# Patient Record
Sex: MALE | Race: WHITE | Employment: FULL TIME | URBAN - METROPOLITAN AREA
[De-identification: names, ages, dates, MRNs, and addresses within clinical notes are randomized per-mention and may not be internally consistent; named-entity substitution may affect disease eponyms.]

---

## 2018-06-01 ENCOUNTER — HOSPITAL ENCOUNTER (EMERGENCY)
Age: 38
Discharge: HOME | End: 2018-06-01
Attending: EMERGENCY MEDICINE
Payer: COMMERCIAL

## 2018-06-01 VITALS
BODY MASS INDEX: 22.96 KG/M2 | OXYGEN SATURATION: 97 % | RESPIRATION RATE: 16 BRPM | DIASTOLIC BLOOD PRESSURE: 74 MMHG | WEIGHT: 155 LBS | TEMPERATURE: 97.7 F | HEIGHT: 69 IN | SYSTOLIC BLOOD PRESSURE: 123 MMHG | HEART RATE: 86 BPM

## 2018-06-01 DIAGNOSIS — J06.9 UPPER RESPIRATORY TRACT INFECTION, UNSPECIFIED TYPE: ICD-10-CM

## 2018-06-01 DIAGNOSIS — H60.503 ACUTE OTITIS EXTERNA OF BOTH EARS, UNSPECIFIED TYPE: Primary | ICD-10-CM

## 2018-06-01 PROCEDURE — 6370000000 HC RX 637 (ALT 250 FOR IP): Performed by: EMERGENCY MEDICINE

## 2018-06-01 PROCEDURE — 99282 EMERGENCY DEPT VISIT SF MDM: CPT

## 2018-06-01 RX ORDER — NEOMYCIN SULFATE, POLYMYXIN B SULFATE AND HYDROCORTISONE 10; 3.5; 1 MG/ML; MG/ML; [USP'U]/ML
2 SUSPENSION/ DROPS AURICULAR (OTIC) EVERY 6 HOURS SCHEDULED
Status: DISCONTINUED | OUTPATIENT
Start: 2018-06-01 | End: 2018-06-01 | Stop reason: HOSPADM

## 2018-06-01 RX ORDER — GUAIFENESIN 600 MG/1
600 TABLET, EXTENDED RELEASE ORAL 2 TIMES DAILY
Qty: 20 TABLET | Refills: 0 | Status: SHIPPED | OUTPATIENT
Start: 2018-06-01 | End: 2019-01-26 | Stop reason: ALTCHOICE

## 2018-06-01 RX ADMIN — NEOMYCIN SULFATE, POLYMYXIN B SULFATE AND HYDROCORTISONE 2 DROP: 3.5; 10000; 1 SUSPENSION AURICULAR (OTIC) at 07:35

## 2018-06-01 ASSESSMENT — ENCOUNTER SYMPTOMS
COLOR CHANGE: 0
SINUS PRESSURE: 0
BACK PAIN: 0
NAUSEA: 0
FACIAL SWELLING: 0
RHINORRHEA: 0
SHORTNESS OF BREATH: 0
VOMITING: 0
SORE THROAT: 0
COUGH: 1
CONSTIPATION: 0
DIARRHEA: 0
BLOOD IN STOOL: 0
EYE DISCHARGE: 0
EYE PAIN: 0
CHEST TIGHTNESS: 0
EYE REDNESS: 0
ABDOMINAL PAIN: 0
TROUBLE SWALLOWING: 0
WHEEZING: 0

## 2018-06-01 ASSESSMENT — PAIN SCALES - GENERAL: PAINLEVEL_OUTOF10: 8

## 2018-06-01 ASSESSMENT — PAIN DESCRIPTION - PAIN TYPE: TYPE: ACUTE PAIN

## 2018-06-01 ASSESSMENT — PAIN DESCRIPTION - ORIENTATION: ORIENTATION: RIGHT;LEFT

## 2018-06-01 ASSESSMENT — PAIN DESCRIPTION - LOCATION: LOCATION: EAR

## 2018-06-23 ENCOUNTER — APPOINTMENT (OUTPATIENT)
Dept: GENERAL RADIOLOGY | Age: 38
End: 2018-06-23
Payer: COMMERCIAL

## 2018-06-23 ENCOUNTER — HOSPITAL ENCOUNTER (EMERGENCY)
Age: 38
Discharge: HOME | End: 2018-06-23
Attending: EMERGENCY MEDICINE
Payer: COMMERCIAL

## 2018-06-23 VITALS
WEIGHT: 160 LBS | DIASTOLIC BLOOD PRESSURE: 62 MMHG | TEMPERATURE: 98.4 F | RESPIRATION RATE: 18 BRPM | HEART RATE: 86 BPM | OXYGEN SATURATION: 96 % | SYSTOLIC BLOOD PRESSURE: 103 MMHG | BODY MASS INDEX: 23.7 KG/M2 | HEIGHT: 69 IN

## 2018-06-23 DIAGNOSIS — S52.044A NONDISPLACED FRACTURE OF CORONOID PROCESS OF RIGHT ULNA, INITIAL ENCOUNTER FOR CLOSED FRACTURE: Primary | ICD-10-CM

## 2018-06-23 PROCEDURE — 6370000000 HC RX 637 (ALT 250 FOR IP): Performed by: EMERGENCY MEDICINE

## 2018-06-23 PROCEDURE — 99283 EMERGENCY DEPT VISIT LOW MDM: CPT

## 2018-06-23 PROCEDURE — 73080 X-RAY EXAM OF ELBOW: CPT

## 2018-06-23 PROCEDURE — 29105 APPLICATION LONG ARM SPLINT: CPT

## 2018-06-23 RX ORDER — IBUPROFEN 800 MG/1
800 TABLET ORAL EVERY 8 HOURS PRN
Qty: 30 TABLET | Refills: 0 | Status: SHIPPED | OUTPATIENT
Start: 2018-06-23 | End: 2019-05-25

## 2018-06-23 RX ORDER — ACETAMINOPHEN 500 MG
500 TABLET ORAL EVERY 4 HOURS PRN
Qty: 60 TABLET | Refills: 0 | Status: SHIPPED | OUTPATIENT
Start: 2018-06-23 | End: 2018-06-25

## 2018-06-23 RX ORDER — IBUPROFEN 800 MG/1
800 TABLET ORAL ONCE
Status: COMPLETED | OUTPATIENT
Start: 2018-06-23 | End: 2018-06-23

## 2018-06-23 RX ORDER — ACETAMINOPHEN 500 MG
1000 TABLET ORAL ONCE
Status: COMPLETED | OUTPATIENT
Start: 2018-06-23 | End: 2018-06-23

## 2018-06-23 RX ADMIN — IBUPROFEN 800 MG: 800 TABLET ORAL at 03:09

## 2018-06-23 RX ADMIN — ACETAMINOPHEN 1000 MG: 500 TABLET, FILM COATED ORAL at 03:09

## 2018-06-23 ASSESSMENT — ENCOUNTER SYMPTOMS
VOMITING: 0
EYE PAIN: 0
SORE THROAT: 0
DIARRHEA: 0
BACK PAIN: 0
SHORTNESS OF BREATH: 0
NAUSEA: 0
ABDOMINAL PAIN: 0
COUGH: 0

## 2018-06-23 ASSESSMENT — PAIN SCALES - GENERAL
PAINLEVEL_OUTOF10: 8
PAINLEVEL_OUTOF10: 8

## 2018-06-25 ENCOUNTER — APPOINTMENT (OUTPATIENT)
Dept: CT IMAGING | Age: 38
End: 2018-06-25
Payer: COMMERCIAL

## 2018-06-25 ENCOUNTER — HOSPITAL ENCOUNTER (EMERGENCY)
Age: 38
Discharge: HOME | End: 2018-06-25
Attending: EMERGENCY MEDICINE
Payer: COMMERCIAL

## 2018-06-25 VITALS
WEIGHT: 160 LBS | DIASTOLIC BLOOD PRESSURE: 62 MMHG | BODY MASS INDEX: 23.7 KG/M2 | TEMPERATURE: 99 F | HEART RATE: 86 BPM | HEIGHT: 69 IN | RESPIRATION RATE: 16 BRPM | OXYGEN SATURATION: 100 % | SYSTOLIC BLOOD PRESSURE: 110 MMHG

## 2018-06-25 DIAGNOSIS — M70.21 OLECRANON BURSITIS OF RIGHT ELBOW: Primary | ICD-10-CM

## 2018-06-25 PROCEDURE — 73200 CT UPPER EXTREMITY W/O DYE: CPT

## 2018-06-25 PROCEDURE — 99283 EMERGENCY DEPT VISIT LOW MDM: CPT

## 2018-06-25 RX ORDER — HYDROCODONE BITARTRATE AND ACETAMINOPHEN 5; 325 MG/1; MG/1
1 TABLET ORAL EVERY 6 HOURS PRN
Qty: 10 TABLET | Refills: 0 | Status: SHIPPED | OUTPATIENT
Start: 2018-06-25 | End: 2018-06-28

## 2018-06-25 ASSESSMENT — PAIN SCALES - GENERAL
PAINLEVEL_OUTOF10: 5
PAINLEVEL_OUTOF10: 5

## 2018-06-25 ASSESSMENT — PAIN DESCRIPTION - LOCATION: LOCATION: ELBOW

## 2018-06-25 ASSESSMENT — PAIN DESCRIPTION - ORIENTATION: ORIENTATION: RIGHT

## 2018-06-28 ENCOUNTER — HOSPITAL ENCOUNTER (EMERGENCY)
Age: 38
Discharge: HOME | End: 2018-06-28
Attending: EMERGENCY MEDICINE
Payer: COMMERCIAL

## 2018-06-28 VITALS
TEMPERATURE: 98.3 F | OXYGEN SATURATION: 100 % | HEIGHT: 69 IN | WEIGHT: 161.7 LBS | SYSTOLIC BLOOD PRESSURE: 112 MMHG | BODY MASS INDEX: 23.95 KG/M2 | RESPIRATION RATE: 16 BRPM | HEART RATE: 87 BPM | DIASTOLIC BLOOD PRESSURE: 76 MMHG

## 2018-06-28 DIAGNOSIS — L03.113 CELLULITIS OF RIGHT ELBOW: Primary | ICD-10-CM

## 2018-06-28 PROCEDURE — 99283 EMERGENCY DEPT VISIT LOW MDM: CPT

## 2018-06-28 RX ORDER — HYDROCODONE BITARTRATE AND ACETAMINOPHEN 5; 325 MG/1; MG/1
1 TABLET ORAL EVERY 6 HOURS PRN
Qty: 4 TABLET | Refills: 0 | Status: SHIPPED | OUTPATIENT
Start: 2018-06-28 | End: 2018-06-30

## 2018-06-28 RX ORDER — CEPHALEXIN 500 MG/1
500 CAPSULE ORAL 4 TIMES DAILY
Qty: 40 CAPSULE | Refills: 0 | Status: SHIPPED | OUTPATIENT
Start: 2018-06-28 | End: 2019-01-26 | Stop reason: ALTCHOICE

## 2018-06-28 ASSESSMENT — ENCOUNTER SYMPTOMS
WHEEZING: 0
EYE DISCHARGE: 0
SHORTNESS OF BREATH: 0
CONSTIPATION: 0
CHEST TIGHTNESS: 0
BLOOD IN STOOL: 0
EYE PAIN: 0
COLOR CHANGE: 0
SORE THROAT: 0
COUGH: 0
BACK PAIN: 0
ABDOMINAL PAIN: 0
VOMITING: 0
RHINORRHEA: 0
DIARRHEA: 0
NAUSEA: 0

## 2018-06-28 ASSESSMENT — PAIN DESCRIPTION - ORIENTATION: ORIENTATION: RIGHT

## 2018-06-28 ASSESSMENT — PAIN SCALES - WONG BAKER: WONGBAKER_NUMERICALRESPONSE: 6

## 2018-06-28 ASSESSMENT — PAIN SCALES - GENERAL: PAINLEVEL_OUTOF10: 6

## 2018-06-28 ASSESSMENT — PAIN DESCRIPTION - LOCATION: LOCATION: ELBOW;HAND

## 2018-06-29 ENCOUNTER — OFFICE VISIT (OUTPATIENT)
Dept: ORTHOPEDIC SURGERY | Age: 38
End: 2018-06-29
Payer: COMMERCIAL

## 2018-06-29 VITALS — BODY MASS INDEX: 23.7 KG/M2 | WEIGHT: 160 LBS | HEIGHT: 69 IN

## 2018-06-29 DIAGNOSIS — T14.8XXA BONE BRUISE: Primary | ICD-10-CM

## 2018-06-29 PROCEDURE — 1036F TOBACCO NON-USER: CPT | Performed by: ORTHOPAEDIC SURGERY

## 2018-06-29 PROCEDURE — G8427 DOCREV CUR MEDS BY ELIG CLIN: HCPCS | Performed by: ORTHOPAEDIC SURGERY

## 2018-06-29 PROCEDURE — G8420 CALC BMI NORM PARAMETERS: HCPCS | Performed by: ORTHOPAEDIC SURGERY

## 2018-06-29 PROCEDURE — 99203 OFFICE O/P NEW LOW 30 MIN: CPT | Performed by: ORTHOPAEDIC SURGERY

## 2018-06-29 RX ORDER — DICLOFENAC SODIUM 75 MG/1
75 TABLET, DELAYED RELEASE ORAL 2 TIMES DAILY WITH MEALS
Qty: 28 TABLET | Refills: 0 | Status: SHIPPED | OUTPATIENT
Start: 2018-06-29 | End: 2019-05-25

## 2018-06-29 NOTE — LETTER
[address] [address]  Phone: [phone]  Fax: [phone]    Luke Collet, MD        June 29, 2018     Patient: Bolivar Christensen   YOB: 1980   Date of Visit: 6/29/2018       To Whom It May Concern: It is my medical opinion that Demarco Mcmillan may return to work on 7/3/2018. If you have any questions or concerns, please don't hesitate to call.     Sincerely,            Luke Collet, MD

## 2018-07-01 NOTE — PROGRESS NOTES
ORTHOPEDIC PATIENT EVALUATION      HPI / Chief Complaint  Rebeca Rosario is a 40 y.o. old right-hand-dominant male who presents for evaluation of right elbow pain. He states that on 6/22/18 he was installing a car seat in the back of his vehicle when he slipped and fell into the door of his car. He had his right elbow and had pain that has progressively worsened. The pain is primarily localized the posterior aspect of the elbow radiating distally into the forearm area. Its associated with some swelling. He was seen in the emergency department where x-rays and a CT scan were obtained. He denies having any associated numbness or tingling. Past Medical History  Ktaie Suh  has a past medical history of ADHD (attention deficit hyperactivity disorder) and Depression. Past Surgical History  Katie Suh  has a past surgical history that includes Facial cosmetic surgery. Current Medications  Current Outpatient Prescriptions   Medication Sig Dispense Refill    diclofenac (VOLTAREN) 75 MG EC tablet Take 1 tablet by mouth 2 times daily (with meals) for 14 days 28 tablet 0    cephALEXin (KEFLEX) 500 MG capsule Take 1 capsule by mouth 4 times daily 40 capsule 0    ibuprofen (ADVIL;MOTRIN) 800 MG tablet Take 1 tablet by mouth every 8 hours as needed for Pain 30 tablet 0    guaiFENesin (MUCINEX) 600 MG extended release tablet Take 1 tablet by mouth 2 times daily 20 tablet 0    promethazine (PHENERGAN) 25 MG tablet Take 1 tablet by mouth every 6 hours as needed for Nausea 10 tablet 0     No current facility-administered medications for this visit. Allergies  Allergies have been reviewed. Katie Suh is allergic to codeine. Social History  Katie Suh  reports that he has never smoked. He has never used smokeless tobacco. He reports that he does not drink alcohol or use drugs. Family History  Harlan's family history is not on file. Review of Systems   History obtained from the patient.    REVIEW OF SYSTEMS: clinic in 2 months for reevaluation but he was encouraged to return or call earlier with questions and/or concerns.

## 2019-01-26 ENCOUNTER — HOSPITAL ENCOUNTER (EMERGENCY)
Age: 39
Discharge: HOME | End: 2019-01-26
Attending: EMERGENCY MEDICINE
Payer: COMMERCIAL

## 2019-01-26 VITALS
SYSTOLIC BLOOD PRESSURE: 125 MMHG | WEIGHT: 157 LBS | HEIGHT: 69 IN | RESPIRATION RATE: 16 BRPM | TEMPERATURE: 98.7 F | DIASTOLIC BLOOD PRESSURE: 74 MMHG | HEART RATE: 90 BPM | OXYGEN SATURATION: 97 % | BODY MASS INDEX: 23.25 KG/M2

## 2019-01-26 DIAGNOSIS — H66.001 ACUTE SUPPURATIVE OTITIS MEDIA OF RIGHT EAR WITHOUT SPONTANEOUS RUPTURE OF TYMPANIC MEMBRANE, RECURRENCE NOT SPECIFIED: ICD-10-CM

## 2019-01-26 DIAGNOSIS — H92.01 RIGHT EAR PAIN: Primary | ICD-10-CM

## 2019-01-26 DIAGNOSIS — J01.00 ACUTE NON-RECURRENT MAXILLARY SINUSITIS: ICD-10-CM

## 2019-01-26 PROCEDURE — 96372 THER/PROPH/DIAG INJ SC/IM: CPT

## 2019-01-26 PROCEDURE — 2500000003 HC RX 250 WO HCPCS: Performed by: EMERGENCY MEDICINE

## 2019-01-26 PROCEDURE — 6360000002 HC RX W HCPCS: Performed by: EMERGENCY MEDICINE

## 2019-01-26 PROCEDURE — 99282 EMERGENCY DEPT VISIT SF MDM: CPT

## 2019-01-26 RX ORDER — LIDOCAINE HYDROCHLORIDE 10 MG/ML
5 INJECTION, SOLUTION INFILTRATION; PERINEURAL ONCE
Status: COMPLETED | OUTPATIENT
Start: 2019-01-26 | End: 2019-01-26

## 2019-01-26 RX ORDER — HYDROCODONE BITARTRATE AND ACETAMINOPHEN 5; 325 MG/1; MG/1
1 TABLET ORAL EVERY 6 HOURS PRN
Qty: 20 TABLET | Refills: 0 | Status: SHIPPED | OUTPATIENT
Start: 2019-01-26 | End: 2019-02-02

## 2019-01-26 RX ORDER — FEXOFENADINE HCL 180 MG/1
180 TABLET ORAL DAILY
Qty: 30 TABLET | Refills: 0 | Status: SHIPPED | OUTPATIENT
Start: 2019-01-26 | End: 2019-02-09

## 2019-01-26 RX ORDER — AMOXICILLIN AND CLAVULANATE POTASSIUM 875; 125 MG/1; MG/1
1 TABLET, FILM COATED ORAL 2 TIMES DAILY
Qty: 14 TABLET | Refills: 0 | Status: SHIPPED | OUTPATIENT
Start: 2019-01-26 | End: 2019-02-02

## 2019-01-26 RX ORDER — IBUPROFEN 800 MG/1
800 TABLET ORAL EVERY 8 HOURS PRN
Qty: 30 TABLET | Refills: 0 | Status: SHIPPED | OUTPATIENT
Start: 2019-01-26 | End: 2019-05-25

## 2019-01-26 RX ORDER — FLUTICASONE PROPIONATE 50 MCG
1 SPRAY, SUSPENSION (ML) NASAL DAILY
Qty: 1 BOTTLE | Refills: 0 | Status: SHIPPED | OUTPATIENT
Start: 2019-01-26 | End: 2019-05-25

## 2019-01-26 RX ORDER — KETOROLAC TROMETHAMINE 30 MG/ML
60 INJECTION, SOLUTION INTRAMUSCULAR; INTRAVENOUS ONCE
Status: COMPLETED | OUTPATIENT
Start: 2019-01-26 | End: 2019-01-26

## 2019-01-26 RX ORDER — CEFTRIAXONE 1 G/1
1 INJECTION, POWDER, FOR SOLUTION INTRAMUSCULAR; INTRAVENOUS ONCE
Status: COMPLETED | OUTPATIENT
Start: 2019-01-26 | End: 2019-01-26

## 2019-01-26 RX ADMIN — KETOROLAC TROMETHAMINE 60 MG: 30 INJECTION, SOLUTION INTRAMUSCULAR at 06:13

## 2019-01-26 RX ADMIN — CEFTRIAXONE SODIUM 1 G: 1 INJECTION, POWDER, FOR SOLUTION INTRAMUSCULAR; INTRAVENOUS at 06:14

## 2019-01-26 RX ADMIN — LIDOCAINE HYDROCHLORIDE 3 ML: 10 INJECTION, SOLUTION INFILTRATION; PERINEURAL at 06:14

## 2019-01-26 ASSESSMENT — ENCOUNTER SYMPTOMS
RESPIRATORY NEGATIVE: 1
SINUS PRESSURE: 1
SORE THROAT: 1
FACIAL SWELLING: 1
RHINORRHEA: 1
SINUS PAIN: 1
GASTROINTESTINAL NEGATIVE: 1

## 2019-01-26 ASSESSMENT — PAIN DESCRIPTION - PAIN TYPE: TYPE: ACUTE PAIN

## 2019-01-26 ASSESSMENT — PAIN DESCRIPTION - LOCATION: LOCATION: EAR

## 2019-01-26 ASSESSMENT — PAIN DESCRIPTION - ORIENTATION: ORIENTATION: RIGHT

## 2019-01-26 ASSESSMENT — PAIN SCALES - GENERAL: PAINLEVEL_OUTOF10: 6

## 2019-05-25 ENCOUNTER — HOSPITAL ENCOUNTER (EMERGENCY)
Age: 39
Discharge: HOME | End: 2019-05-25
Attending: EMERGENCY MEDICINE
Payer: COMMERCIAL

## 2019-05-25 VITALS
TEMPERATURE: 98.2 F | HEIGHT: 69 IN | WEIGHT: 160 LBS | HEART RATE: 104 BPM | DIASTOLIC BLOOD PRESSURE: 74 MMHG | SYSTOLIC BLOOD PRESSURE: 122 MMHG | RESPIRATION RATE: 16 BRPM | OXYGEN SATURATION: 97 % | BODY MASS INDEX: 23.7 KG/M2

## 2019-05-25 DIAGNOSIS — K08.89 PAIN, DENTAL: Primary | ICD-10-CM

## 2019-05-25 PROCEDURE — 99282 EMERGENCY DEPT VISIT SF MDM: CPT

## 2019-05-25 PROCEDURE — 6360000002 HC RX W HCPCS: Performed by: EMERGENCY MEDICINE

## 2019-05-25 PROCEDURE — 6370000000 HC RX 637 (ALT 250 FOR IP): Performed by: EMERGENCY MEDICINE

## 2019-05-25 PROCEDURE — 96372 THER/PROPH/DIAG INJ SC/IM: CPT

## 2019-05-25 RX ORDER — KETOROLAC TROMETHAMINE 10 MG/1
10 TABLET, FILM COATED ORAL EVERY 6 HOURS PRN
Qty: 15 TABLET | Refills: 0 | Status: SHIPPED | OUTPATIENT
Start: 2019-05-25 | End: 2019-05-27 | Stop reason: SDUPTHER

## 2019-05-25 RX ORDER — PENICILLIN V POTASSIUM 500 MG/1
500 TABLET ORAL 4 TIMES DAILY
Qty: 28 TABLET | Refills: 0 | Status: SHIPPED | OUTPATIENT
Start: 2019-05-25 | End: 2019-06-01

## 2019-05-25 RX ORDER — KETOROLAC TROMETHAMINE 30 MG/ML
30 INJECTION, SOLUTION INTRAMUSCULAR; INTRAVENOUS ONCE
Status: DISCONTINUED | OUTPATIENT
Start: 2019-05-25 | End: 2019-05-25

## 2019-05-25 RX ORDER — PENICILLIN V POTASSIUM 250 MG/1
500 TABLET ORAL ONCE
Status: COMPLETED | OUTPATIENT
Start: 2019-05-25 | End: 2019-05-25

## 2019-05-25 RX ORDER — KETOROLAC TROMETHAMINE 30 MG/ML
30 INJECTION, SOLUTION INTRAMUSCULAR; INTRAVENOUS ONCE
Status: COMPLETED | OUTPATIENT
Start: 2019-05-25 | End: 2019-05-25

## 2019-05-25 RX ADMIN — PENICILLIN V POTASIUM 500 MG: 250 TABLET ORAL at 06:18

## 2019-05-25 RX ADMIN — KETOROLAC TROMETHAMINE 30 MG: 30 INJECTION, SOLUTION INTRAMUSCULAR; INTRAVENOUS at 06:21

## 2019-05-25 ASSESSMENT — PAIN SCALES - GENERAL: PAINLEVEL_OUTOF10: 8

## 2019-05-25 ASSESSMENT — ENCOUNTER SYMPTOMS
TROUBLE SWALLOWING: 0
FACIAL SWELLING: 0

## 2019-05-25 NOTE — ED PROVIDER NOTES
1 tablet by mouth 4 times daily for 7 days         Festus Smith MD  Attending Emergency Physician                      Festus Smith MD  05/25/19 9676

## 2019-05-26 ENCOUNTER — HOSPITAL ENCOUNTER (EMERGENCY)
Age: 39
Discharge: HOME | End: 2019-05-27
Attending: EMERGENCY MEDICINE
Payer: COMMERCIAL

## 2019-05-26 DIAGNOSIS — K08.89 PAIN, DENTAL: Primary | ICD-10-CM

## 2019-05-26 PROCEDURE — 99282 EMERGENCY DEPT VISIT SF MDM: CPT

## 2019-05-26 ASSESSMENT — PAIN SCALES - GENERAL: PAINLEVEL_OUTOF10: 9

## 2019-05-26 ASSESSMENT — PAIN DESCRIPTION - PAIN TYPE: TYPE: ACUTE PAIN

## 2019-05-26 ASSESSMENT — PAIN DESCRIPTION - ORIENTATION: ORIENTATION: RIGHT;UPPER

## 2019-05-26 ASSESSMENT — PAIN DESCRIPTION - LOCATION: LOCATION: TEETH

## 2019-05-26 NOTE — LETTER
[facility] ED  [address] [address]  Phone: [phone]               Keely 3, 2019    Patient: Deanne Gonzalez   YOB: 1980   Date of Visit: 5/26/2019       To Whom It May Concern:    Yisel Arias was seen and treated in our emergency department on 5/26/2019.        Sincerely,       Baldemar Bernstein RN         Signature:__________________________________

## 2019-05-27 VITALS
TEMPERATURE: 97.9 F | HEIGHT: 69 IN | RESPIRATION RATE: 16 BRPM | WEIGHT: 165 LBS | DIASTOLIC BLOOD PRESSURE: 60 MMHG | SYSTOLIC BLOOD PRESSURE: 105 MMHG | OXYGEN SATURATION: 99 % | BODY MASS INDEX: 24.44 KG/M2 | HEART RATE: 54 BPM

## 2019-05-27 PROCEDURE — 96372 THER/PROPH/DIAG INJ SC/IM: CPT

## 2019-05-27 PROCEDURE — 6370000000 HC RX 637 (ALT 250 FOR IP): Performed by: EMERGENCY MEDICINE

## 2019-05-27 PROCEDURE — 6360000002 HC RX W HCPCS: Performed by: EMERGENCY MEDICINE

## 2019-05-27 RX ORDER — KETOROLAC TROMETHAMINE 30 MG/ML
30 INJECTION, SOLUTION INTRAMUSCULAR; INTRAVENOUS ONCE
Status: COMPLETED | OUTPATIENT
Start: 2019-05-27 | End: 2019-05-27

## 2019-05-27 RX ORDER — KETOROLAC TROMETHAMINE 10 MG/1
10 TABLET, FILM COATED ORAL EVERY 6 HOURS PRN
Qty: 15 TABLET | Refills: 0 | Status: SHIPPED | OUTPATIENT
Start: 2019-05-27 | End: 2019-11-07

## 2019-05-27 RX ORDER — PENICILLIN V POTASSIUM 250 MG/1
500 TABLET ORAL ONCE
Status: COMPLETED | OUTPATIENT
Start: 2019-05-27 | End: 2019-05-27

## 2019-05-27 RX ADMIN — KETOROLAC TROMETHAMINE 30 MG: 30 INJECTION, SOLUTION INTRAMUSCULAR; INTRAVENOUS at 00:36

## 2019-05-27 RX ADMIN — PENICILLIN V POTASIUM 500 MG: 250 TABLET ORAL at 00:35

## 2019-05-27 ASSESSMENT — ENCOUNTER SYMPTOMS
COUGH: 0
TROUBLE SWALLOWING: 0

## 2019-05-27 ASSESSMENT — PAIN SCALES - GENERAL: PAINLEVEL_OUTOF10: 9

## 2019-05-27 NOTE — ED NOTES
3347: Pt said that he had become nauseated, diaphoretic, and felt like he was going to pass out. Writer to the nurse's station to speak with Dr. Diogo Singh in regards to same, but unavailable. 0045: VS's obtained. 9153: Writer updated Charge RN, Horace, in regards to same. She is taking over care at this time.       Sushila Speaks, FERNANDA  05/27/19 5846

## 2019-05-27 NOTE — ED NOTES
RN checked on patient. Pt states his nurse \"left him\" when he felt sick after a shot. I informed him that she went to get the doctor and that I came in to sit with him and check on him. He was not satisfied with this and states that he would better care somewhere else. RN apologized that he felt that way and he states \"Im feeling fine now just discharged me\".      Lizbeth Enrique RN  05/27/19 0100

## 2019-05-27 NOTE — ED PROVIDER NOTES
[address] ED  eMERGENCY dEPARTMENT eNCOUnter      Pt Name: Lee Ann Carlos  MRN: [id number]  [place] 1980  Date of evaluation: 5/27/19      CHIEF COMPLAINT       Chief Complaint   Patient presents with    Dental Pain     HISTORY OF PRESENT ILLNESS   HPI 45 y.o. male presents with complaints of dental pain. The patient was seen here 2 days prior for the same. He says that he has been busy at work and did not go to the pharmacy to fill his prescription for antibiotics or pain medication, and he states that he actually lost the prescription for the Toradol. He reports persistent pain similar to previous. No fevers or chills. Or neck swelling. Reports that he had relief with the Toradol that he received here the other day. REVIEW OF SYSTEMS     Review of Systems   Constitutional: Negative for fever. HENT: Positive for dental problem. Negative for trouble swallowing. Eyes: Negative for visual disturbance. Respiratory: Negative for cough. Neurological: Negative for headaches. PAST MEDICAL HISTORY     Past Medical History:   Diagnosis Date    ADHD (attention deficit hyperactivity disorder)     Depression        SURGICAL HISTORY       Past Surgical History:   Procedure Laterality Date    ADENOIDECTOMY AND TYMPANOSTOMY TUBE PLACEMENT      FACIAL COSMETIC SURGERY         CURRENT MEDICATIONS       Discharge Medication List as of 5/27/2019 12:56 AM      CONTINUE these medications which have NOT CHANGED    Details   benzocaine (ORAJEL) 10 % mucosal gel Take by mouth as needed. , Disp-7 g, R-0, Print      penicillin v potassium (VEETID) 500 MG tablet Take 1 tablet by mouth 4 times daily for 7 days, Disp-28 tablet, R-0Print      Amphetamine-Dextroamphetamine (ADDERALL PO) Take by mouthHistorical Med             ALLERGIES     is allergic to codeine. FAMILY HISTORY     has no family status information on file. SOCIAL HISTORY      reports that he has never smoked.  He has never used smokeless tobacco. He reports that he does not drink alcohol or use drugs. PHYSICAL EXAM     INITIAL VITALS: /60   Pulse 54   Temp 97.9 °F (36.6 °C) (Oral)   Resp 16   Ht 5' 9\" (1.753 m)   Wt 165 lb (74.8 kg)   SpO2 99%   BMI 24.37 kg/m²     Gen: NAD  Head; NC/AT  ENT: There is some mild gingival erythema around the front teeth, otherwise no abscess. Neck: There is no erythema, edema, adenopathy, stridor  Cardiovascular RRR  Pulmonary CTA  Neuro: Fluent speech and was worried with a normal gait      MEDICAL DECISION MAKING:     MDM    This is a 44-year-old gentleman with persistent dental pain, hasn't been adherent to the therapy were prescribed when he was seen here 2 days ago. We'll refill his pain medication. We will give him another shot of Toradol since helped him up before. I still do not see any evidence of any abscess or Bunny angina. D/w pt treatment plan, warning precautions for prompt ED return and importance of close OP FU, he verbalizes understanding and agrees with the treatment plan. DIAGNOSTIC RESULTS     The patient was given the following medications while in the emergency department:  Orders Placed This Encounter   Medications    penicillin v potassium (VEETID) tablet 500 mg    ketorolac (TORADOL) injection 30 mg    ketorolac (TORADOL) 10 MG tablet     Sig: Take 1 tablet by mouth every 6 hours as needed for Pain     Dispense:  15 tablet     Refill:  0     -------------------------  CRITICAL CARE:   CONSULTS: None  PROCEDURES: Procedures     FINAL IMPRESSION      1.  Pain, dental          DISPOSITION/PLAN   DISPOSITION Decision To Discharge 05/27/2019 12:30:00 AM      PATIENT REFERRED TO:  Your Dentist    In 2 days      [facility] ED  [address] [address]  [phone]    If symptoms worsen      DISCHARGE MEDICATIONS:  Discharge Medication List as of 5/27/2019 12:56 AM            Mary Ann Strong MD  Attending Emergency

## 2019-05-27 NOTE — ED NOTES
Pt said that his tooth pain started on Thursday. Pt said that it is on the top (R) side of his mouth and he feels the pain there and straight up towards his nose. Pt said that about 2 months ago he got the root canal done on that tooth. Pt hasn't been able to get to the Pharmacy to  the Rx's from last visit at the ER r/t the Pharmacy hours and his work hours (12 hours shifts at [employer]) and it being a holiday weekend. No further complaints verbalized.       Josselin Mcguire RN  05/27/19 5682

## 2019-06-18 ENCOUNTER — APPOINTMENT (OUTPATIENT)
Dept: GENERAL RADIOLOGY | Age: 39
End: 2019-06-18
Payer: COMMERCIAL

## 2019-06-18 ENCOUNTER — HOSPITAL ENCOUNTER (EMERGENCY)
Age: 39
Discharge: HOME | End: 2019-06-18
Attending: EMERGENCY MEDICINE
Payer: COMMERCIAL

## 2019-06-18 VITALS
OXYGEN SATURATION: 99 % | RESPIRATION RATE: 14 BRPM | WEIGHT: 155 LBS | HEART RATE: 89 BPM | SYSTOLIC BLOOD PRESSURE: 110 MMHG | DIASTOLIC BLOOD PRESSURE: 72 MMHG | TEMPERATURE: 98 F | HEIGHT: 69 IN | BODY MASS INDEX: 22.96 KG/M2

## 2019-06-18 DIAGNOSIS — S69.92XA INJURY OF LEFT HAND, INITIAL ENCOUNTER: Primary | ICD-10-CM

## 2019-06-18 PROCEDURE — 6370000000 HC RX 637 (ALT 250 FOR IP): Performed by: PHYSICIAN ASSISTANT

## 2019-06-18 PROCEDURE — 73130 X-RAY EXAM OF HAND: CPT

## 2019-06-18 PROCEDURE — 99283 EMERGENCY DEPT VISIT LOW MDM: CPT

## 2019-06-18 RX ORDER — IBUPROFEN 800 MG/1
800 TABLET ORAL ONCE
Status: COMPLETED | OUTPATIENT
Start: 2019-06-18 | End: 2019-06-18

## 2019-06-18 RX ADMIN — IBUPROFEN 800 MG: 800 TABLET ORAL at 21:49

## 2019-06-18 ASSESSMENT — PAIN DESCRIPTION - ORIENTATION: ORIENTATION: LEFT

## 2019-06-18 ASSESSMENT — PAIN DESCRIPTION - LOCATION: LOCATION: HAND

## 2019-06-18 ASSESSMENT — PAIN SCALES - GENERAL: PAINLEVEL_OUTOF10: 8

## 2019-06-18 ASSESSMENT — PAIN DESCRIPTION - PAIN TYPE: TYPE: ACUTE PAIN

## 2019-06-19 NOTE — ED PROVIDER NOTES
[address] [facility] ED  eMERGENCY dEPARTMENT eNCOUnter   [address] [facility]     Pt Name: Rusty Bernstein  MRN: [id number]  [place] 1980  Date of evaluation: 6/18/19   Rusty Bernstein is a 45 y.o. male who presents with Hand Injury (Left)    Vitals:   Vitals:    06/18/19 2145   BP: 110/72   Pulse: 89   Resp: 14   Temp: 98 °F (36.7 °C)   TempSrc: Oral   SpO2: 99%   Weight: 155 lb (70.3 kg)   Height: 5' 9\" (1.753 m)     Impression:   1. Injury of left hand, initial encounter      I was personally available for consultation in the Emergency Department. I have reviewed the chart and agree with the documentation as recorded by the [facility], including the assessment, treatment plan and disposition.   Carolyn Heath MD  Attending Emergency  Physician                  Carolyn Heath MD  06/18/19 9898

## 2019-10-02 ENCOUNTER — HOSPITAL ENCOUNTER (EMERGENCY)
Age: 39
Discharge: HOME | End: 2019-10-02
Attending: EMERGENCY MEDICINE
Payer: COMMERCIAL

## 2019-10-02 VITALS
WEIGHT: 160 LBS | RESPIRATION RATE: 18 BRPM | HEART RATE: 87 BPM | SYSTOLIC BLOOD PRESSURE: 126 MMHG | OXYGEN SATURATION: 97 % | BODY MASS INDEX: 23.7 KG/M2 | TEMPERATURE: 98.1 F | DIASTOLIC BLOOD PRESSURE: 76 MMHG | HEIGHT: 69 IN

## 2019-10-02 DIAGNOSIS — B36.9 FUNGAL SKIN INFECTION: Primary | ICD-10-CM

## 2019-10-02 PROCEDURE — 99282 EMERGENCY DEPT VISIT SF MDM: CPT

## 2019-10-02 RX ORDER — FLUCONAZOLE 150 MG/1
150 TABLET ORAL DAILY
Qty: 2 TABLET | Refills: 0 | Status: SHIPPED | OUTPATIENT
Start: 2019-10-02 | End: 2019-10-04

## 2019-10-02 RX ORDER — CLOTRIMAZOLE 1 %
CREAM (GRAM) TOPICAL
Qty: 14 G | Refills: 1 | Status: SHIPPED | OUTPATIENT
Start: 2019-10-02 | End: 2019-10-09

## 2019-10-02 ASSESSMENT — PAIN DESCRIPTION - LOCATION: LOCATION: FACE

## 2019-10-02 ASSESSMENT — PAIN SCALES - GENERAL: PAINLEVEL_OUTOF10: 6

## 2019-10-02 ASSESSMENT — PAIN DESCRIPTION - FREQUENCY: FREQUENCY: CONTINUOUS

## 2019-10-02 ASSESSMENT — PAIN DESCRIPTION - PROGRESSION: CLINICAL_PROGRESSION: NOT CHANGED

## 2019-10-02 ASSESSMENT — PAIN DESCRIPTION - DESCRIPTORS: DESCRIPTORS: SORE

## 2019-10-02 ASSESSMENT — PAIN DESCRIPTION - PAIN TYPE: TYPE: ACUTE PAIN

## 2019-10-02 ASSESSMENT — PAIN DESCRIPTION - ONSET: ONSET: ON-GOING

## 2019-11-07 ENCOUNTER — HOSPITAL ENCOUNTER (EMERGENCY)
Age: 39
Discharge: HOME | End: 2019-11-07
Attending: EMERGENCY MEDICINE
Payer: COMMERCIAL

## 2019-11-07 VITALS
BODY MASS INDEX: 24.82 KG/M2 | HEART RATE: 96 BPM | RESPIRATION RATE: 14 BRPM | HEIGHT: 69 IN | DIASTOLIC BLOOD PRESSURE: 70 MMHG | WEIGHT: 167.56 LBS | OXYGEN SATURATION: 98 % | SYSTOLIC BLOOD PRESSURE: 129 MMHG | TEMPERATURE: 97.3 F

## 2019-11-07 DIAGNOSIS — R21 RASH: Primary | ICD-10-CM

## 2019-11-07 DIAGNOSIS — B86 SCABIES: ICD-10-CM

## 2019-11-07 PROCEDURE — 99282 EMERGENCY DEPT VISIT SF MDM: CPT

## 2019-11-07 PROCEDURE — 96372 THER/PROPH/DIAG INJ SC/IM: CPT

## 2019-11-07 PROCEDURE — 6360000002 HC RX W HCPCS: Performed by: EMERGENCY MEDICINE

## 2019-11-07 RX ORDER — DEXAMETHASONE SODIUM PHOSPHATE 10 MG/ML
10 INJECTION INTRAMUSCULAR; INTRAVENOUS ONCE
Status: COMPLETED | OUTPATIENT
Start: 2019-11-07 | End: 2019-11-07

## 2019-11-07 RX ORDER — PERMETHRIN 50 MG/G
CREAM TOPICAL
Qty: 1 TUBE | Refills: 1 | Status: SHIPPED | OUTPATIENT
Start: 2019-11-07 | End: 2019-12-30 | Stop reason: SDUPTHER

## 2019-11-07 RX ORDER — PREDNISONE 50 MG/1
50 TABLET ORAL DAILY
Qty: 5 TABLET | Refills: 0 | Status: SHIPPED | OUTPATIENT
Start: 2019-11-07 | End: 2019-11-12

## 2019-11-07 RX ORDER — HYDROXYZINE HYDROCHLORIDE 25 MG/1
25 TABLET, FILM COATED ORAL EVERY 6 HOURS PRN
Qty: 20 TABLET | Refills: 0 | Status: SHIPPED | OUTPATIENT
Start: 2019-11-07 | End: 2019-11-17

## 2019-11-07 RX ORDER — CITALOPRAM 40 MG/1
40 TABLET ORAL DAILY
COMMUNITY
End: 2021-09-03

## 2019-11-07 RX ADMIN — DEXAMETHASONE SODIUM PHOSPHATE 10 MG: 10 INJECTION INTRAMUSCULAR; INTRAVENOUS at 06:31

## 2019-11-12 ENCOUNTER — HOSPITAL ENCOUNTER (EMERGENCY)
Age: 39
Discharge: HOME | End: 2019-11-12
Attending: EMERGENCY MEDICINE
Payer: COMMERCIAL

## 2019-11-12 VITALS
WEIGHT: 167 LBS | SYSTOLIC BLOOD PRESSURE: 121 MMHG | RESPIRATION RATE: 16 BRPM | TEMPERATURE: 98.5 F | HEIGHT: 69 IN | OXYGEN SATURATION: 97 % | BODY MASS INDEX: 24.73 KG/M2 | DIASTOLIC BLOOD PRESSURE: 67 MMHG | HEART RATE: 84 BPM

## 2019-11-12 DIAGNOSIS — R21 RASH AND OTHER NONSPECIFIC SKIN ERUPTION: Primary | ICD-10-CM

## 2019-11-12 DIAGNOSIS — Z76.0 ENCOUNTER FOR MEDICATION REFILL: ICD-10-CM

## 2019-11-12 DIAGNOSIS — B86 SCABIES: ICD-10-CM

## 2019-11-12 PROCEDURE — 99282 EMERGENCY DEPT VISIT SF MDM: CPT

## 2019-11-12 RX ORDER — PERMETHRIN 50 MG/G
CREAM TOPICAL
Qty: 1 TUBE | Refills: 1 | Status: SHIPPED | OUTPATIENT
Start: 2019-11-12 | End: 2019-12-30

## 2019-12-30 ENCOUNTER — HOSPITAL ENCOUNTER (EMERGENCY)
Age: 39
Discharge: HOME | End: 2019-12-30
Attending: EMERGENCY MEDICINE
Payer: COMMERCIAL

## 2019-12-30 VITALS
SYSTOLIC BLOOD PRESSURE: 130 MMHG | TEMPERATURE: 98.1 F | RESPIRATION RATE: 14 BRPM | BODY MASS INDEX: 24.59 KG/M2 | WEIGHT: 166 LBS | OXYGEN SATURATION: 98 % | DIASTOLIC BLOOD PRESSURE: 92 MMHG | HEIGHT: 69 IN | HEART RATE: 122 BPM

## 2019-12-30 PROCEDURE — 99282 EMERGENCY DEPT VISIT SF MDM: CPT

## 2019-12-30 RX ORDER — PERMETHRIN 50 MG/G
CREAM TOPICAL
Qty: 1 TUBE | Refills: 1 | Status: SHIPPED | OUTPATIENT
Start: 2019-12-30 | End: 2020-01-02

## 2019-12-30 ASSESSMENT — ENCOUNTER SYMPTOMS
WHEEZING: 0
DIARRHEA: 0
VOMITING: 0
COUGH: 0
NAUSEA: 0
ABDOMINAL PAIN: 0

## 2020-01-02 ENCOUNTER — OFFICE VISIT (OUTPATIENT)
Dept: DERMATOLOGY | Age: 40
End: 2020-01-02
Payer: COMMERCIAL

## 2020-01-02 VITALS
BODY MASS INDEX: 24.73 KG/M2 | WEIGHT: 167 LBS | HEIGHT: 69 IN | SYSTOLIC BLOOD PRESSURE: 108 MMHG | HEART RATE: 127 BPM | OXYGEN SATURATION: 98 % | DIASTOLIC BLOOD PRESSURE: 77 MMHG

## 2020-01-02 PROCEDURE — 1036F TOBACCO NON-USER: CPT | Performed by: DERMATOLOGY

## 2020-01-02 PROCEDURE — G8420 CALC BMI NORM PARAMETERS: HCPCS | Performed by: DERMATOLOGY

## 2020-01-02 PROCEDURE — G8484 FLU IMMUNIZE NO ADMIN: HCPCS | Performed by: DERMATOLOGY

## 2020-01-02 PROCEDURE — G8427 DOCREV CUR MEDS BY ELIG CLIN: HCPCS | Performed by: DERMATOLOGY

## 2020-01-02 PROCEDURE — 99202 OFFICE O/P NEW SF 15 MIN: CPT | Performed by: DERMATOLOGY

## 2020-01-02 RX ORDER — PERMETHRIN 50 MG/G
CREAM TOPICAL
Qty: 1 TUBE | Refills: 0 | Status: SHIPPED | OUTPATIENT
Start: 2020-01-02 | End: 2020-01-07

## 2020-01-02 RX ORDER — IVERMECTIN 3 MG/1
200 TABLET ORAL ONCE
Qty: 5 TABLET | Refills: 0 | Status: SHIPPED | OUTPATIENT
Start: 2020-01-02 | End: 2020-01-02

## 2020-01-02 NOTE — PATIENT INSTRUCTIONS
- Ivermectin orally  - Permethrin cream applied from neck down, leave on overnight and repeat in 7 days

## 2020-01-02 NOTE — PROGRESS NOTES
Position: Sitting, Cuff Size: Large Adult)   Pulse 127   Ht 5' 9\" (1.753 m)   Wt 167 lb (75.8 kg)   SpO2 98%   BMI 24.66 kg/m²     General Exam:  General Appearance: No acute distress, Well nourished     Neuro: Alert and oriented to person, place and time  Psych: Normal affect   Lymph Node: Not performed    Cutaneous Exam: Performed as documented in clinic note below. Waist-up skin, which includes the head/face,neck, both arms, chest, back, abdomen, digits and/or nails, was examined. Pertinent Physical Exam Findings:  Physical Exam  Hyperpigmented pinpoint macules over chest, back, wrists, arms, spares face  Few pink papules of shoulders and arms    Photo surveillance performed: No    Medical Necessity of Exam Performed:   Distribution of patient concerns    Additional Diagnostic Testing performed during exam: Scabies Prep ,  Negative    ASSESSMENT:   Diagnosis Orders   1. Scabies  ivermectin 3 MG tablet    permethrin (ELIMITE) 5 % cream       Plan of Action is as Follows:  Assessment   1. Scabies vs. Post-scabetic dermatitis -- has PIH quite suggestive of prior infestation and few active papules today, reports re-exposure  Educated patient on decontamination of clothing and bedding (including stuffed animals) that were in contact with the patient during prior 48-72 hours with machine wash at 60 degrees C (140 degrees F) and hot dryer, or sealing items in a plastic bag for greater than 48-72 hours. - children should also be treated at the same time -- they have permethrin from the ER. They are scheduled to see Dr. Ata Vazquez in February  - ivermectin 3 MG tablet; Take 5 tablets by mouth once for 1 dose  Dispense: 5 tablet; Refill: 0  - permethrin (ELIMITE) 5 % cream; Apply topically from neck down and wash off after 8 hours.  Repeat in one week  Dispense: 1 Tube; Refill: 0    RTC prn            Patient Instructions   - Ivermectin orally  - Permethrin cream applied from neck down, leave on overnight and repeat in

## 2020-01-06 ENCOUNTER — TELEPHONE (OUTPATIENT)
Dept: DERMATOLOGY | Age: 40
End: 2020-01-06

## 2020-01-06 NOTE — TELEPHONE ENCOUNTER
Call from pt says was looking on line and found that he may have chiggers although ER diagnosed him with scabies. He would like to discuss this further with you on the phone.

## 2020-01-06 NOTE — TELEPHONE ENCOUNTER
Spoke with patient. He wants sees little black dots on himself and on his daughters. Thinks they may be chiggers. When I informed him that chiggers do not infest the body nor stay in the skin or infest a home, he asked what they could be then. I told him I could not say without seeing in person. He then asked for a follow-up appt.  Please schedule

## 2020-01-07 ENCOUNTER — OFFICE VISIT (OUTPATIENT)
Dept: DERMATOLOGY | Age: 40
End: 2020-01-07
Payer: COMMERCIAL

## 2020-01-07 VITALS
OXYGEN SATURATION: 98 % | DIASTOLIC BLOOD PRESSURE: 79 MMHG | HEIGHT: 69 IN | HEART RATE: 95 BPM | SYSTOLIC BLOOD PRESSURE: 115 MMHG | WEIGHT: 171.6 LBS | BODY MASS INDEX: 25.42 KG/M2

## 2020-01-07 PROCEDURE — G8427 DOCREV CUR MEDS BY ELIG CLIN: HCPCS | Performed by: DERMATOLOGY

## 2020-01-07 PROCEDURE — G8484 FLU IMMUNIZE NO ADMIN: HCPCS | Performed by: DERMATOLOGY

## 2020-01-07 PROCEDURE — G8419 CALC BMI OUT NRM PARAM NOF/U: HCPCS | Performed by: DERMATOLOGY

## 2020-01-07 PROCEDURE — 1036F TOBACCO NON-USER: CPT | Performed by: DERMATOLOGY

## 2020-01-07 PROCEDURE — 99213 OFFICE O/P EST LOW 20 MIN: CPT | Performed by: DERMATOLOGY

## 2020-01-07 NOTE — PROGRESS NOTES
Dermatology Patient Note  [facility]YSIC[facility]  [facility]  [address] [address]  Dept: [phone]  Dept Fax: [phone]      VISITDATE: 1/7/2020   REFERRING PROVIDER: No ref. provider found      Fabian Valencia is a 44 y.o. male  who presents today in the office for:    Follow-up (follow upon scabies. No improvement. Still has new spots popping up. Pt only took oral pills. He gave them the permethrin and DID NOT use it himself)      HISTORY OF PRESENT ILLNESS:  Patient presents for f/u \"bites\"  Interval history: got his twin girls from their mom and they had \"new bites and scratches. \" he gave them the permethrin cream while he took the ivermectin. He states after they came to his house he developed new itchy bumps. He collected black dots from his girls' skin and from the mattress cover they slept on, brought them on pieces of tape today. He admits that he doesn't really believe his skin problem is of great concern; he is more concerned that his 3 yo twin girls' have an infestation and wants them treated. He believes mom is not taking their skin problems seriously. They have appt with Dr. Simi Jane on 2/28. CURRENT MEDICATIONS:   Current Outpatient Medications   Medication Sig Dispense Refill    citalopram (CELEXA) 40 MG tablet Take 40 mg by mouth daily      Amphetamine-Dextroamphetamine (ADDERALL PO) Take by mouth       No current facility-administered medications for this visit. ALLERGIES:   Allergies   Allergen Reactions    Codeine Nausea And Vomiting       SOCIAL HISTORY:  Social History     Tobacco Use    Smoking status: Never Smoker    Smokeless tobacco: Never Used   Substance Use Topics    Alcohol use: No       REVIEW OF SYSTEMS:  Review of Systems  Skin: Denies any new changing, growing orbleeding lesions or rashes except as described in the HPI   Constitutional: Denies fevers, chills, and malaise.     PHYSICAL EXAM:   /[birth year] (Site: Right Upper Arm, byediting.     Electronically signed by Jason Coffman MD on 1/7/20 at 2:35 PM

## 2020-03-17 ENCOUNTER — HOSPITAL ENCOUNTER (EMERGENCY)
Age: 40
Discharge: HOME | End: 2020-03-17
Attending: EMERGENCY MEDICINE
Payer: COMMERCIAL

## 2020-03-17 VITALS
WEIGHT: 171 LBS | TEMPERATURE: 97.9 F | SYSTOLIC BLOOD PRESSURE: 134 MMHG | DIASTOLIC BLOOD PRESSURE: 82 MMHG | HEART RATE: 98 BPM | RESPIRATION RATE: 16 BRPM | OXYGEN SATURATION: 99 % | BODY MASS INDEX: 25.33 KG/M2 | HEIGHT: 69 IN

## 2020-03-17 PROCEDURE — 99282 EMERGENCY DEPT VISIT SF MDM: CPT

## 2020-03-17 RX ORDER — IVERMECTIN 3 MG/1
150 TABLET ORAL ONCE
Qty: 4 TABLET | Refills: 1 | Status: SHIPPED | OUTPATIENT
Start: 2020-03-17 | End: 2020-03-17

## 2020-03-17 RX ORDER — PERMETHRIN 50 MG/G
CREAM TOPICAL
Qty: 1 TUBE | Refills: 1 | Status: SHIPPED | OUTPATIENT
Start: 2020-03-17 | End: 2020-03-20 | Stop reason: SDUPTHER

## 2020-03-17 ASSESSMENT — PAIN DESCRIPTION - DESCRIPTORS: DESCRIPTORS: ITCHING

## 2020-03-17 ASSESSMENT — ENCOUNTER SYMPTOMS
COLOR CHANGE: 0
SHORTNESS OF BREATH: 0
DIARRHEA: 0
VOMITING: 0
COUGH: 0
NAUSEA: 0

## 2020-03-17 ASSESSMENT — PAIN DESCRIPTION - PAIN TYPE: TYPE: ACUTE PAIN

## 2020-03-17 ASSESSMENT — PAIN SCALES - GENERAL: PAINLEVEL_OUTOF10: 6

## 2020-03-17 NOTE — ED PROVIDER NOTES
eMERGENCY dEPARTMENT eNCOUnter   [address] Name: Angel Lopes  MRN: [id number]  [place] 1980  Date of evaluation: 3/17/20     Angel Lopes is a 44 y.o. male with CC: Rash      Based on the medical record the care appears appropriate. I was personally available for consultation in the Emergency Department.     Satnam Wei MD  Attending Emergency Physician                  Karolina Maldonado MD  03/17/20 1929

## 2020-03-20 ENCOUNTER — HOSPITAL ENCOUNTER (EMERGENCY)
Age: 40
Discharge: HOME | End: 2020-03-20
Attending: EMERGENCY MEDICINE
Payer: COMMERCIAL

## 2020-03-20 VITALS
RESPIRATION RATE: 16 BRPM | HEIGHT: 69 IN | OXYGEN SATURATION: 99 % | SYSTOLIC BLOOD PRESSURE: 120 MMHG | HEART RATE: 139 BPM | TEMPERATURE: 98.6 F | WEIGHT: 170 LBS | BODY MASS INDEX: 25.18 KG/M2 | DIASTOLIC BLOOD PRESSURE: 70 MMHG

## 2020-03-20 PROCEDURE — 99282 EMERGENCY DEPT VISIT SF MDM: CPT

## 2020-03-20 RX ORDER — PERMETHRIN 50 MG/G
CREAM TOPICAL
Qty: 1 TUBE | Refills: 1 | Status: SHIPPED | OUTPATIENT
Start: 2020-03-20 | End: 2020-07-10

## 2020-03-20 RX ORDER — IVERMECTIN 3 MG/1
200 TABLET ORAL ONCE
Qty: 4 TABLET | Refills: 0 | Status: SHIPPED | OUTPATIENT
Start: 2020-03-20 | End: 2020-03-20

## 2020-03-20 ASSESSMENT — PAIN DESCRIPTION - FREQUENCY: FREQUENCY: CONTINUOUS

## 2020-03-20 ASSESSMENT — PAIN SCALES - GENERAL: PAINLEVEL_OUTOF10: 7

## 2020-03-20 ASSESSMENT — PAIN DESCRIPTION - LOCATION: LOCATION: GENERALIZED

## 2020-03-21 ASSESSMENT — ENCOUNTER SYMPTOMS
VOMITING: 0
WHEEZING: 0
RHINORRHEA: 0
SORE THROAT: 0
NAUSEA: 0
DIARRHEA: 0
CONSTIPATION: 0
COUGH: 0
SHORTNESS OF BREATH: 0
SINUS PRESSURE: 0
COLOR CHANGE: 0
ABDOMINAL PAIN: 0

## 2020-07-10 ENCOUNTER — HOSPITAL ENCOUNTER (EMERGENCY)
Age: 40
Discharge: HOME | End: 2020-07-10
Attending: EMERGENCY MEDICINE
Payer: COMMERCIAL

## 2020-07-10 ENCOUNTER — TELEPHONE (OUTPATIENT)
Dept: DERMATOLOGY | Age: 40
End: 2020-07-10

## 2020-07-10 VITALS
BODY MASS INDEX: 24.66 KG/M2 | OXYGEN SATURATION: 97 % | TEMPERATURE: 98.2 F | WEIGHT: 167 LBS | HEART RATE: 119 BPM | SYSTOLIC BLOOD PRESSURE: 124 MMHG | DIASTOLIC BLOOD PRESSURE: 81 MMHG

## 2020-07-10 PROCEDURE — 99282 EMERGENCY DEPT VISIT SF MDM: CPT

## 2020-07-10 RX ORDER — PERMETHRIN 50 MG/G
CREAM TOPICAL
Qty: 60 G | Refills: 0 | Status: SHIPPED | OUTPATIENT
Start: 2020-07-10 | End: 2020-08-21 | Stop reason: ALTCHOICE

## 2020-07-10 RX ORDER — IVERMECTIN 3 MG/1
200 TABLET ORAL ONCE
Qty: 4 TABLET | Refills: 0 | Status: SHIPPED | OUTPATIENT
Start: 2020-07-10 | End: 2020-07-10 | Stop reason: SDUPTHER

## 2020-07-10 RX ORDER — IVERMECTIN 3 MG/1
200 TABLET ORAL ONCE
Qty: 5 TABLET | Refills: 0 | Status: SHIPPED | OUTPATIENT
Start: 2020-07-10 | End: 2020-07-10

## 2020-07-10 NOTE — TELEPHONE ENCOUNTER
Pt called was seen in ER,urgent care and w/Dr Tan on 1/7/2020 for scabies,pt would like to talk to clinical staff ASAP regarding this,wants an appt ASAP,please review and advise

## 2020-07-10 NOTE — ED PROVIDER NOTES
EMERGENCY DEPARTMENT ENCOUNTER    Pt Name: Gabriel Bosch  MRN: [id number]  [place] 1980  Date of evaluation: 7/10/20  CHIEF COMPLAINT       Chief Complaint   Patient presents with    Rash     treated for scabies in past by dermatologist     HISTORY OF PRESENT ILLNESS   Is a 79-year-old male with PMH of ADHD, depression previously treated for scabies who presents to the ED complaining of scabies rash to folds of fingers. Rash identical in location, quality and severity as previous exposure to scabies. Patient try to see dermatologist however they were booked. He does not have fever, cough, chest pain, shortness of breath, abdominal pain. REVIEW OF SYSTEMS     Review of Systems   All other systems reviewed and are negative. PASTMEDICAL HISTORY     Past Medical History:   Diagnosis Date    ADHD (attention deficit hyperactivity disorder)     Depression      SURGICAL HISTORY       Past Surgical History:   Procedure Laterality Date    ADENOIDECTOMY AND TYMPANOSTOMY TUBE PLACEMENT      FACIAL COSMETIC SURGERY       CURRENT MEDICATIONS       Previous Medications    AMPHETAMINE-DEXTROAMPHETAMINE (ADDERALL PO)    Take by mouth    CITALOPRAM (CELEXA) 40 MG TABLET    Take 40 mg by mouth daily     ALLERGIES     is allergic to codeine. FAMILY HISTORY     has no family status information on file. SOCIAL HISTORY       Social History     Tobacco Use    Smoking status: Never Smoker    Smokeless tobacco: Never Used   Substance Use Topics    Alcohol use: No    Drug use: No     PHYSICAL EXAM     INITIAL VITALS: /81   Pulse 119   Temp 98.2 °F (36.8 °C)   Wt 167 lb (75.8 kg)   SpO2 97%   BMI 24.66 kg/m²    Physical Exam  HENT:      Head: Normocephalic. Right Ear: External ear normal.      Left Ear: External ear normal.      Nose: Nose normal.   Eyes:      Conjunctiva/sclera: Conjunctivae normal.   Cardiovascular:      Rate and Rhythm: Normal rate.    Pulmonary:      Effort: Pulmonary effort is normal.   Abdominal:      General: Abdomen is flat. Skin:     General: Skin is dry. Findings: Rash present. Comments: Scattered red papules webbing of fingers, feet. Neurological:      Mental Status: He is alert. Mental status is at baseline. Psychiatric:         Mood and Affect: Mood normal.         Behavior: Behavior normal.         MEDICAL DECISION MAKING:   The patient is hemodynamically stable, afebrile, nontoxic-appearing. Physical exam notable for scattered papules webbing of fingers and toes. Based on history and exam likely scabies. ED plan for Bactrim, permethrin cream, discharge. DIAGNOSTIC RESULTS   EKG:All EKG's are interpreted by the Emergency Department Physician who either signs or Co-signs this chart in the absence of a cardiologist.        RADIOLOGY:All plain film, CT, MRI, and formal ultrasound images (except ED bedside ultrasound) are read by the radiologist, see reports below, unless otherwisenoted in MDM or here. No orders to display     LABS: All lab results were reviewed by myself, and all abnormals are listed below. Labs Reviewed - No data to display    EMERGENCY DEPARTMENTCOURSE:         Vitals:    Vitals:    07/10/20 0847 07/10/20 0900   BP: 124/81    Pulse: 119    Temp: 98.2 °F (36.8 °C)    SpO2: 97%    Weight:  167 lb (75.8 kg)       The patient was given the following medications while in the emergency department:  Orders Placed This Encounter   Medications    permethrin (ELIMITE) 5 % cream     Sig: Apply topically as directed     Dispense:  60 g     Refill:  0    ivermectin 3 MG tablet     Sig: Take 5 tablets by mouth once for 1 dose     Dispense:  4 tablet     Refill:  0     CONSULTS:  None    FINAL IMPRESSION      1. Scabies          DISPOSITION/PLAN   DISPOSITION Decision To Discharge 07/10/2020 08:58:04 AM      PATIENT REFERRED TO:  No follow-up provider specified.   DISCHARGE MEDICATIONS:  New Prescriptions    IVERMECTIN 3 MG TABLET    Take 5 tablets by mouth once for 1 dose    PERMETHRIN (ELIMITE) 5 % CREAM    Apply topically as directed     Zia Dubois MD  Attending Emergency Physician                    Judi Villalta MD  07/10/20 8067

## 2020-07-10 NOTE — ED NOTES
STS TREATED IN PAST FOR SCABIES BY DERMATOLOGIST. STS RECENTLY HAVING SYMPTOMS AGAIN.      Alvin Villafana RN  07/10/20 1371

## 2020-07-10 NOTE — LETTER
[facility] ED  [address] [address]  Phone: [phone]             July 10, 2020    Patient: Gabriel Bosch   YOB: 1980   Date of Visit: 7/10/2020       To Whom It May Concern:    Ainsley Bundy was seen and treated in our emergency department on 7/10/2020. He may be off work and return on 7/11/20. Mina WORKMAN            Signature:__________________________________

## 2020-07-13 NOTE — TELEPHONE ENCOUNTER
The ED note doesn't state to follow up with us, looks like the same meds you prescribed were prescribed at discharge. Does he need an OV?

## 2020-07-13 NOTE — TELEPHONE ENCOUNTER
Can schedule an overbook slot in the next 1-2 weeks.  This non-emergent but given his concern I will get him in

## 2020-08-21 ENCOUNTER — OFFICE VISIT (OUTPATIENT)
Dept: DERMATOLOGY | Age: 40
End: 2020-08-21
Payer: COMMERCIAL

## 2020-08-21 ENCOUNTER — HOSPITAL ENCOUNTER (OUTPATIENT)
Age: 40
Setting detail: SPECIMEN
Discharge: HOME | End: 2020-08-21
Payer: COMMERCIAL

## 2020-08-21 VITALS
TEMPERATURE: 97.2 F | BODY MASS INDEX: 24.17 KG/M2 | HEART RATE: 100 BPM | HEIGHT: 69 IN | DIASTOLIC BLOOD PRESSURE: 78 MMHG | SYSTOLIC BLOOD PRESSURE: 121 MMHG | WEIGHT: 163.2 LBS | OXYGEN SATURATION: 98 %

## 2020-08-21 PROCEDURE — 11105 PUNCH BX SKIN EA SEP/ADDL: CPT | Performed by: DERMATOLOGY

## 2020-08-21 PROCEDURE — 11104 PUNCH BX SKIN SINGLE LESION: CPT | Performed by: DERMATOLOGY

## 2020-08-21 RX ORDER — LIDOCAINE HYDROCHLORIDE AND EPINEPHRINE 10; 10 MG/ML; UG/ML
1 INJECTION, SOLUTION INFILTRATION; PERINEURAL ONCE
Status: COMPLETED | OUTPATIENT
Start: 2020-08-21 | End: 2020-08-21

## 2020-08-21 RX ADMIN — LIDOCAINE HYDROCHLORIDE AND EPINEPHRINE 1 ML: 10; 10 INJECTION, SOLUTION INFILTRATION; PERINEURAL at 12:52

## 2020-08-21 NOTE — PATIENT INSTRUCTIONS

## 2020-08-21 NOTE — PROGRESS NOTES
Dermatology Patient Note  [facility] [address]  [address]  [address]  Dept: [phone]  Dept Fax: [phone]      VISITDATE: 8/21/2020   REFERRING PROVIDER: No ref. provider found      Rodolfo Jacobs is a 44 y.o. male  who presents today in the office for:    Other (Rash on the arms and legs, has been there since january. He states that it doesnt go away. He has been to the ED many times for it. )      HISTORY OF PRESENT ILLNESS:  44 y.o. male presenting for rash  Location: arms and legs  Duration: since January  Symptoms: not very itchy  Course: persistent, won't heal  Exacerbating factors: unsure. He does pick  Prior treatments: ivermectin, permethrin, antifungal cream      CURRENT MEDICATIONS:   Current Outpatient Medications   Medication Sig Dispense Refill    citalopram (CELEXA) 40 MG tablet Take 40 mg by mouth daily      Amphetamine-Dextroamphetamine (ADDERALL PO) Take by mouth       Current Facility-Administered Medications   Medication Dose Route Frequency Provider Last Rate Last Dose    lidocaine-EPINEPHrine 1 percent-1:801636 injection 1 mL  1 mL Intradermal Once Elsi Heck MD           ALLERGIES:   Allergies   Allergen Reactions    Codeine Nausea And Vomiting       SOCIAL HISTORY:  Social History     Tobacco Use    Smoking status: Never Smoker    Smokeless tobacco: Never Used   Substance Use Topics    Alcohol use: No       REVIEW OF SYSTEMS:  Review of Systems  Skin: Denies any new changing, growing orbleeding lesions or rashes except as described in the HPI   Constitutional: Denies fevers, chills, and malaise.     PHYSICAL EXAM:   /78 (Site: Left Upper Arm, Position: Sitting, Cuff Size: Medium Adult)   Pulse 100   Temp 97.2 °F (36.2 °C)   Ht 5' 9\" (1.753 m)   Wt 163 lb 3.2 oz (74 kg)   SpO2 98%   BMI 24.10 kg/m²     General Exam:  General Appearance: No acute distress, Well nourished     Neuro: Alert and oriented to person, place and time  Psych: Normal affect   Lymph Node: Not performed    Cutaneous Exam: Performed as documented in clinic note below. Head/face,neck, both arms, digits and/or nails, and limited lower extremities (that which is visible with pants/shorts and shoes/socks on) was examined. Pertinent Physical Exam Findings:  Physical Exam  Bilateral arms and legs with skin colored to pink erythematous nodules with central well defined ulcerations    Photo surveillance performed: Yes    Medical Necessity of Exam Performed:   Distribution of patient concerns    Additional Diagnostic Testing performed during exam: Not performed ,  Not performed    ASSESSMENT:   Diagnosis Orders   1. Prurigo nodularis  Surgical Pathology    MO PUNCH BIOPSY SKIN SINGLE LESION    MO PUNCH BIOPSY SKIN EA SEP/ADDITIONAL LESION    lidocaine-EPINEPHrine 1 percent-1:623992 injection 1 mL    Culture, Tissue    Culture, Fungus    Culture with Smear, Acid Fast Bacillius       Plan of Action is as Follows:  Assessment   1. Prurigo nodularis with no primary lesions noted. DDx includes prurigo nodularis vs. Atypical infection (deep fungal, mycobacterial)  Punch Biopsy x 2 (H&E and tissue culture): The procedure and its risks were explained including but not limited to pain, bleeding, infection, permanent scar, permanent pigment alteration and need for an additional procedure. Consent to proceed with the procedure was obtained from the patient or the parent. After cleaning with alcohol the lesions were anesthetized with 1% lidocaine with epinephrine and two 4 mm punches were performed on the left arm. Hemostasis was achieved with suture closure of the defects with 4-0 Ethilon and Vaseline and a bandage were applied.  - Surgical Pathology; Future  - MO PUNCH BIOPSY SKIN SINGLE LESION  - MO PUNCH BIOPSY SKIN EA SEP/ADDITIONAL LESION  - lidocaine-EPINEPHrine 1 percent-1:463321 injection 1 mL  - Culture, Tissue; Future  - Culture, Fungus;  Future  - Culture with Smear, Acid Fast Bacillius; Future    RTC pending path            Patient Instructions   BIOPSY WOUND CARE    A biopsy is where a small piece of skin tissue is removed and examined by a pathologist.  When a biopsy is done, there is a small wound site that requires proper care to prevent infection and scarring. Some biopsies require sutures and their removal.    How to Care for Biopsy Wound    A.  Leave band-aid or dressing on for 24 hours. B. Wash two times a day with soap and water. C.  Let the wound air dry, then apply Vaseline ointment and cover with a Band-Aid       unless otherwise instructed by your provider. D. If there is slight discomfort, you may give acetaminophen or ibuprofen. When To Call the Doctor    Call the Dermatology Clinic or your doctor if any of the following occur:    A. Redness and swelling  B. Tenderness and warm to touch  C.  Drainage from wound  D. Fever    Biopsy Results    Biopsy results are usually available in 1-2 weeks. We provide biopsy results in letters for begin results or we will call for any concerning results. If you have not heard from our staff please call the office within 2 weeks. Please call our office with any concerns at [phone]. Follow-up: No follow-ups on file. This note was created with the assistance of a speech-recognition program.  Although the intention is to generate a document that actually reflects the content of the visit, no guarantees can be provided that every mistake has been identified and corrected byediting.     Electronically signed by Isauro Dillon MD on 8/21/20 at 11:37 AM EDT

## 2020-08-25 LAB
CULTURE: ABNORMAL
DERMATOLOGY PATHOLOGY REPORT: NORMAL
DIRECT EXAM: ABNORMAL
Lab: ABNORMAL
SPECIMEN DESCRIPTION: ABNORMAL

## 2020-08-26 ENCOUNTER — TELEPHONE (OUTPATIENT)
Dept: DERMATOLOGY | Age: 40
End: 2020-08-26

## 2020-08-26 RX ORDER — CEPHALEXIN 500 MG/1
1000 CAPSULE ORAL 2 TIMES DAILY
Qty: 40 CAPSULE | Refills: 0 | Status: SHIPPED | OUTPATIENT
Start: 2020-08-26 | End: 2020-09-05

## 2020-08-26 RX ORDER — GENTAMICIN SULFATE 1 MG/G
OINTMENT TOPICAL
Qty: 30 G | Refills: 2 | Status: SHIPPED | OUTPATIENT
Start: 2020-08-26 | End: 2020-09-02

## 2020-08-26 NOTE — TELEPHONE ENCOUNTER
Tried to call patient, unable to LVM    Biopsy showed irritated skin which could eczema, an allergic reaction, or a bug bite. It also grew some bacteria from the biopsy site which we should treat with an antibiotic pill and topical antibiotic. These were sent to pharmacy. The irritated skin can be treated with triamcinolone after the sores heal. I will send this as well.

## 2020-08-28 LAB
CULTURE: NORMAL
DIRECT EXAM: NORMAL
Lab: NORMAL
SPECIMEN DESCRIPTION: NORMAL

## 2020-09-09 RX ORDER — CEPHALEXIN 500 MG/1
1000 CAPSULE ORAL 2 TIMES DAILY
Qty: 40 CAPSULE | Refills: 0 | OUTPATIENT
Start: 2020-09-09 | End: 2020-09-19

## 2020-09-09 NOTE — TELEPHONE ENCOUNTER
Refill is not indicated. We have called several times to discuss his results and have been unable to reach him.  You can send a letter to him but I do not believe sending an email is HIPAA compliant

## 2020-09-09 NOTE — TELEPHONE ENCOUNTER
Pt called asking for something that has his diagnosis on it faxd to [email or web address]

## 2020-09-20 LAB
CULTURE: NORMAL
Lab: NORMAL
SPECIMEN DESCRIPTION: NORMAL

## 2021-01-12 ENCOUNTER — HOSPITAL ENCOUNTER (EMERGENCY)
Age: 41
Discharge: HOME | End: 2021-01-12
Attending: EMERGENCY MEDICINE
Payer: COMMERCIAL

## 2021-01-12 VITALS
SYSTOLIC BLOOD PRESSURE: 158 MMHG | HEART RATE: 124 BPM | BODY MASS INDEX: 26.02 KG/M2 | OXYGEN SATURATION: 97 % | RESPIRATION RATE: 14 BRPM | TEMPERATURE: 98 F | WEIGHT: 176.2 LBS | DIASTOLIC BLOOD PRESSURE: 94 MMHG

## 2021-01-12 DIAGNOSIS — L28.2 PRURIGO: ICD-10-CM

## 2021-01-12 DIAGNOSIS — R21 RASH: Primary | ICD-10-CM

## 2021-01-12 DIAGNOSIS — F41.9 ANXIETY: ICD-10-CM

## 2021-01-12 DIAGNOSIS — Z76.0 MEDICATION REFILL: ICD-10-CM

## 2021-01-12 PROCEDURE — 99283 EMERGENCY DEPT VISIT LOW MDM: CPT

## 2021-01-12 PROCEDURE — 99285 EMERGENCY DEPT VISIT HI MDM: CPT

## 2021-01-12 RX ORDER — CLONIDINE HYDROCHLORIDE 0.1 MG/1
0.2 TABLET ORAL 2 TIMES DAILY
Qty: 28 TABLET | Refills: 0 | Status: SHIPPED | OUTPATIENT
Start: 2021-01-12 | End: 2021-09-03 | Stop reason: SDUPTHER

## 2021-01-12 NOTE — ED NOTES
Pt to ed with c/o rash and anxiety. Pt states he is out of anxiety medication. Pt a&ox3. Skin warm and dry. Respirations even and non-labored.       Anne Chavez RN  01/12/21 [id number]

## 2021-01-12 NOTE — ED PROVIDER NOTES
[facility] ED  eMERGENCY dEPARTMENT eNCOUnter      Pt Name: Gordon Ca  MRN: [id number]  [place] 1980  Date of evaluation: 1/12/2021  Provider: JANESSA Al [id number]       Chief Complaint   Patient presents with    Anxiety    Rash         HISTORY OF PRESENT ILLNESS  (Location/Symptom, Timing/Onset, Context/Setting, Quality, Duration, Modifying Factors, Severity.)   Gordon Ca is a 36 y.o. male who presents to the emergency department via private auto for anxiety and a rash. States he has a history of prurigo nodularis. His anxiety has been flared up the past few days which caused his rash to return. States he is out of his clonidine (0.2mg BID) which helps with his anxiety and tachycardia. He is also out of his steroid cream for his rash. Denies fever, chills, cough, SOB, difficulty swallowing, facial swelling. Reports he recently transferred his care to another facility and has not received his normal medications. Denies pain at this time. Denies suicidal ideations. Nursing Notes were reviewed. ALLERGIES     Codeine    CURRENT MEDICATIONS       Discharge Medication List as of 1/12/2021  6:36 PM      CONTINUE these medications which have NOT CHANGED    Details   citalopram (CELEXA) 40 MG tablet Take 40 mg by mouth dailyHistorical Med      Amphetamine-Dextroamphetamine (ADDERALL PO) Take by mouthHistorical Med             PAST MEDICAL HISTORY         Diagnosis Date    ADHD (attention deficit hyperactivity disorder)     Depression        SURGICAL HISTORY           Procedure Laterality Date    ADENOIDECTOMY AND TYMPANOSTOMY TUBE PLACEMENT      FACIAL COSMETIC SURGERY           FAMILY HISTORY     No family history on file. No family status information on file. SOCIAL HISTORY      reports that he has never smoked. He has never used smokeless tobacco. He reports that he does not drink alcohol or use drugs.     REVIEW OF SYSTEMS    (2-9 systems for nerve deficit. Motor: Motor function is intact. No weakness. Coordination: Coordination is intact. Gait: Gait is intact. Gait normal.   Psychiatric:         Behavior: Behavior normal.         EMERGENCY DEPARTMENT COURSE and DIFFERENTIAL DIAGNOSIS/MDM:   Vitals:    Vitals:    01/12/21 1744 01/12/21 1746   BP:  (!) 158/94   Pulse:  124   Resp:  14   Temp:  98 °F (36.7 °C)   SpO2:  97%   Weight: 176 lb 3.2 oz (79.9 kg)        CLINICAL DECISION MAKING:  The patient presented alert with a nontoxic appearance and was seen in conjunction with Dr. Hi Gross. Prescriptions were provided for catapres and the steroid cream. Follow up with pcp, return to ED if condition worsens. FINAL IMPRESSION      1. Rash    2. Anxiety    3. Medication refill    4.  Prurigo            DISPOSITION/PLAN   DISPOSITION Decision To Discharge 01/12/2021 06:17:21 PM      PATIENT REFERRED TO:   Call [facility] to establish care for follow up    Schedule an appointment as soon as possible for a visit         DISCHARGE MEDICATIONS:     Discharge Medication List as of 1/12/2021  6:36 PM      START taking these medications    Details   cloNIDine (CATAPRES) 0.1 MG tablet Take 2 tablets by mouth 2 times daily, Disp-28 tablet, R-0Normal                 (Please note that portions of this note were completed with a voice recognition program.  Efforts were made to edit the dictations but occasionally words are mis-transcribed.)    JANESSA Martinez CNP, APRN - CNP  01/14/21 [id number]

## 2021-01-12 NOTE — ED PROVIDER NOTES
The patient was seen and examined by me in conjunction with the mid-level provider. I agree with his/her assessment and treatment plan. The patient will be provided medications but he will follow-up with his dermatologist for future care.      Shari Rios MD  01/12/21 Brayden Arevalo

## 2021-01-14 ASSESSMENT — ENCOUNTER SYMPTOMS
FACIAL SWELLING: 0
ABDOMINAL PAIN: 0
COUGH: 0
SHORTNESS OF BREATH: 0
COLOR CHANGE: 0
VOICE CHANGE: 0
TROUBLE SWALLOWING: 0

## 2021-03-09 NOTE — ED PROVIDER NOTES
Depression. SURGICAL HISTORY      has a past surgical history that includes Facial cosmetic surgery and Adenoidectomy w/Tympanostomy Tube Placem. CURRENT MEDICATIONS       Current Discharge Medication List      CONTINUE these medications which have NOT CHANGED    Details   citalopram (CELEXA) 40 MG tablet Take 40 mg by mouth daily      Amphetamine-Dextroamphetamine (ADDERALL PO) Take by mouth             ALLERGIES     is allergic to codeine. FAMILY HISTORY     has no family status information on file. family history is not on file. SOCIAL HISTORY      reports that he has never smoked. He has never used smokeless tobacco. He reports that he does not drink alcohol or use drugs. PHYSICAL EXAM     INITIAL VITALS:  height is 5' 9\" (1.753 m) and weight is 166 lb (75.3 kg). His temperature is 98.1 °F (36.7 °C). His blood pressure is 130/92 (abnormal) and his pulse is 122. His respiration is 14 and oxygen saturation is 98%. Physical Exam  Constitutional:       General: He is not in acute distress. Appearance: Normal appearance. He is not ill-appearing or toxic-appearing. HENT:      Head: Normocephalic and atraumatic. Cardiovascular:      Rate and Rhythm: Regular rhythm. Tachycardia present. Pulmonary:      Effort: No respiratory distress. Breath sounds: No wheezing. Abdominal:      Tenderness: There is no tenderness. Skin:     Findings: Erythema and rash present. Comments: B/L arms w/ hyperpigmentation from prior rash. Some active wounds, bite vs. Mechanical injury. No signs expanding erythema/cellulitis   Neurological:      Mental Status: He is alert.        DIFFERENTIAL DIAGNOSIS/MDM:   Scabies  Contact dermatitis  Insect bite    DIAGNOSTIC RESULTS     EKG: All EKG's are interpreted by the Emergency Department Physician who either signs or Co-signs this chart in the absence of a cardiologist.    RADIOLOGY:   I directly visualized the following  images and reviewed the stated

## 2021-09-01 ENCOUNTER — HOSPITAL ENCOUNTER (EMERGENCY)
Age: 41
Discharge: HOME | End: 2021-09-01
Attending: EMERGENCY MEDICINE

## 2021-09-01 VITALS
WEIGHT: 175 LBS | SYSTOLIC BLOOD PRESSURE: 103 MMHG | OXYGEN SATURATION: 99 % | BODY MASS INDEX: 25.92 KG/M2 | HEART RATE: 79 BPM | RESPIRATION RATE: 16 BRPM | HEIGHT: 69 IN | TEMPERATURE: 98.3 F | DIASTOLIC BLOOD PRESSURE: 70 MMHG

## 2021-09-01 DIAGNOSIS — F41.9 ANXIETY: Primary | ICD-10-CM

## 2021-09-01 PROCEDURE — 99282 EMERGENCY DEPT VISIT SF MDM: CPT

## 2021-09-01 ASSESSMENT — ENCOUNTER SYMPTOMS
VOMITING: 0
EYE DISCHARGE: 0
FACIAL SWELLING: 0
DIARRHEA: 0
COLOR CHANGE: 0
EYE REDNESS: 0
SHORTNESS OF BREATH: 0
CONSTIPATION: 0
ABDOMINAL PAIN: 0
COUGH: 0

## 2021-09-01 NOTE — ED PROVIDER NOTES
[address] [place] [address] ED  EMERGENCY DEPARTMENT ENCOUNTER      Pt Name: Mariana Daniels  MRN: [id number]  [place] 1980  Date of evaluation: 9/1/2021  Provider: Maria Esther Billy MD    [address] [place] [address]       Chief Complaint   Patient presents with    Anxiety     [facility] apptment [facility] wants refill on celexa         HISTORY OF PRESENT ILLNESS  (Location/Symptom, Timing/Onset, Context/Setting, Quality, Duration, Modifying Factors, Severity.)   Mariana Daniels is a 36 y.o. male who presents to the emergency department to refill his medications for anxiety. He states he does not have a primary care doctor and he was seen at [facility] psychiatric facility today. His next appointment is in October and and the patient wants 6 weeks of Celexa. He has no physical symptoms and no complaints otherwise. No fever or cough. Nursing Notes were reviewed. ALLERGIES     Codeine    CURRENT MEDICATIONS       Previous Medications    AMPHETAMINE-DEXTROAMPHETAMINE (ADDERALL PO)    Take by mouth    CITALOPRAM (CELEXA) 40 MG TABLET    Take 40 mg by mouth daily Indications: out of med     CLONIDINE (CATAPRES) 0.1 MG TABLET    Take 2 tablets by mouth 2 times daily    TRIAMCINOLONE (KENALOG) 0.1 % OINTMENT    Apply to irritated skin twice daily       PAST MEDICAL HISTORY         Diagnosis Date    ADHD (attention deficit hyperactivity disorder)     Depression        SURGICAL HISTORY           Procedure Laterality Date    ADENOIDECTOMY AND TYMPANOSTOMY TUBE PLACEMENT      FACIAL COSMETIC SURGERY           FAMILY HISTORY     History reviewed. No pertinent family history. No family status information on file. SOCIAL HISTORY      reports that he has never smoked. He has never used smokeless tobacco. He reports that he does not drink alcohol and does not use drugs. REVIEW OF SYSTEMS    (2-9 systems for level 4, 10 or more for level 5)     Review of Systems   Constitutional: Negative for chills, fatigue and fever.    HENT: Negative for congestion, ear discharge and facial swelling. Eyes: Negative for discharge and redness. Respiratory: Negative for cough and shortness of breath. Cardiovascular: Negative for chest pain. Gastrointestinal: Negative for abdominal pain, constipation, diarrhea and vomiting. Genitourinary: Negative for dysuria and hematuria. Musculoskeletal: Negative for arthralgias. Skin: Negative for color change and rash. Neurological: Negative for syncope, numbness and headaches. Hematological: Negative for adenopathy. Psychiatric/Behavioral: Negative for confusion. The patient is not nervous/anxious. Except as noted above the remainder of the review of systems was reviewed and negative. PHYSICAL EXAM    (up to 7 for level 4, 8 or more for level 5)     Vitals:    09/01/21 1140   BP: 103/70   Pulse: 79   Resp: 16   Temp: 98.3 °F (36.8 °C)   TempSrc: Oral   SpO2: 99%   Weight: 175 lb (79.4 kg)   Height: 5' 9\" (1.753 m)       Physical Exam  Vitals reviewed. Constitutional:       General: He is not in acute distress. Appearance: He is well-developed. He is not diaphoretic. HENT:      Head: Normocephalic and atraumatic. Eyes:      General: No scleral icterus. Right eye: No discharge. Left eye: No discharge. Cardiovascular:      Rate and Rhythm: Normal rate and regular rhythm. Pulmonary:      Effort: Pulmonary effort is normal. No respiratory distress. Breath sounds: Normal breath sounds. No stridor. No wheezing or rales. Abdominal:      General: There is no distension. Palpations: Abdomen is soft. Tenderness: There is no abdominal tenderness. Musculoskeletal:         General: Normal range of motion. Cervical back: Neck supple. Lymphadenopathy:      Cervical: No cervical adenopathy. Skin:     General: Skin is warm and dry. Findings: No erythema or rash.    Neurological:      Mental Status: He is alert and oriented to person, place, and time. Psychiatric:         Behavior: Behavior normal.             DIAGNOSTIC RESULTS     EKG: All EKG's are interpreted by the Emergency Department Physician who either signs or Co-signs this chart in the absence of a cardiologist.    Not indicated    RADIOLOGY:   Non-plain film images such as CT, Ultrasound and MRI are read by the radiologist. Plain radiographic images are visualized and preliminarily interpreted by the emergency physician with the below findings:    Not indicated    Interpretation per the Radiologist below, if available at the time of this note:        LABS:  Labs Reviewed - No data to display    All other labs were within normal range or not returned as of this dictation. EMERGENCY DEPARTMENT COURSE and DIFFERENTIAL DIAGNOSIS/MDM:   Vitals:    Vitals:    09/01/21 1140   BP: 103/70   Pulse: 79   Resp: 16   Temp: 98.3 °F (36.8 °C)   TempSrc: Oral   SpO2: 99%   Weight: 175 lb (79.4 kg)   Height: 5' 9\" (1.753 m)       No orders of the defined types were placed in this encounter. Medical Decision Making: I do not feel comfortable with prescribing this patient Celexa for 6 weeks. He is referred to [facility] and will call today for a prompt follow-up appointment. Treatment diagnosis and follow-up were discussed with the patient. CONSULTS:  None    PROCEDURES:  None    FINAL IMPRESSION      1.  Anxiety          DISPOSITION/PLAN   DISPOSITION Decision To Discharge 09/01/2021 11:49:43 AM      PATIENT REFERRED TO:   [facility] ED  [address] [facility]  [phone]    If symptoms worsen    See attached list            DISCHARGE MEDICATIONS:     New Prescriptions    No medications on file         (Please note that portions of this note were completed with a voice recognition program.  Efforts were made to edit the dictations but occasionally words are mis-transcribed.)    Mark Tanner MD  Attending Emergency Physician           Mark Tanner MD  09/01/21 [address] [place]

## 2021-09-03 ENCOUNTER — OFFICE VISIT (OUTPATIENT)
Dept: FAMILY MEDICINE CLINIC | Age: 41
End: 2021-09-03
Payer: COMMERCIAL

## 2021-09-03 VITALS
BODY MASS INDEX: 26.58 KG/M2 | HEART RATE: 82 BPM | DIASTOLIC BLOOD PRESSURE: 70 MMHG | OXYGEN SATURATION: 97 % | SYSTOLIC BLOOD PRESSURE: 120 MMHG | WEIGHT: 180 LBS

## 2021-09-03 DIAGNOSIS — F33.1 MODERATE EPISODE OF RECURRENT MAJOR DEPRESSIVE DISORDER (HCC): ICD-10-CM

## 2021-09-03 DIAGNOSIS — F41.1 GENERALIZED ANXIETY DISORDER: Primary | ICD-10-CM

## 2021-09-03 DIAGNOSIS — Z13.1 DIABETES MELLITUS SCREENING: ICD-10-CM

## 2021-09-03 DIAGNOSIS — Z13.220 LIPID SCREENING: ICD-10-CM

## 2021-09-03 PROCEDURE — 99203 OFFICE O/P NEW LOW 30 MIN: CPT | Performed by: FAMILY MEDICINE

## 2021-09-03 RX ORDER — FLUOXETINE HYDROCHLORIDE 20 MG/1
20 CAPSULE ORAL DAILY
Qty: 30 CAPSULE | Refills: 3 | Status: SHIPPED | OUTPATIENT
Start: 2021-09-03 | End: 2021-10-07

## 2021-09-03 RX ORDER — CLONIDINE HYDROCHLORIDE 0.1 MG/1
0.2 TABLET ORAL 2 TIMES DAILY
Qty: 30 TABLET | Refills: 5 | Status: SHIPPED | OUTPATIENT
Start: 2021-09-03 | End: 2021-10-07 | Stop reason: SDUPTHER

## 2021-09-03 SDOH — ECONOMIC STABILITY: FOOD INSECURITY: WITHIN THE PAST 12 MONTHS, YOU WORRIED THAT YOUR FOOD WOULD RUN OUT BEFORE YOU GOT MONEY TO BUY MORE.: NEVER TRUE

## 2021-09-03 SDOH — ECONOMIC STABILITY: FOOD INSECURITY: WITHIN THE PAST 12 MONTHS, THE FOOD YOU BOUGHT JUST DIDN'T LAST AND YOU DIDN'T HAVE MONEY TO GET MORE.: NEVER TRUE

## 2021-09-03 ASSESSMENT — PATIENT HEALTH QUESTIONNAIRE - PHQ9
1. LITTLE INTEREST OR PLEASURE IN DOING THINGS: 0
SUM OF ALL RESPONSES TO PHQ9 QUESTIONS 1 & 2: 0
SUM OF ALL RESPONSES TO PHQ QUESTIONS 1-9: 0
2. FEELING DOWN, DEPRESSED OR HOPELESS: 0

## 2021-09-03 ASSESSMENT — SOCIAL DETERMINANTS OF HEALTH (SDOH): HOW HARD IS IT FOR YOU TO PAY FOR THE VERY BASICS LIKE FOOD, HOUSING, MEDICAL CARE, AND HEATING?: NOT HARD AT ALL

## 2021-09-03 NOTE — PATIENT INSTRUCTIONS
Patient Education      Patient Education        Depression and Chronic Disease: Care Instructions  Your Care Instructions     A chronic disease is one that you have for a long time. Some chronic diseases can be controlled, but they usually cannot be cured. Depression is common in people with chronic diseases, but it often goes unnoticed. Many people have concerns about seeking treatment for a mental health problem. You may think it's a sign of weakness, or you don't want people to know about it. It's important to overcome these reasons for not seeking treatment. Treating depression or anxiety is good for your health. Follow-up care is a key part of your treatment and safety. Be sure to make and go to all appointments, and call your doctor if you are having problems. It's also a good idea to know your test results and keep a list of the medicines you take. How can you care for yourself at home? Watch for symptoms of depression  The symptoms of depression are often subtle at first. You may think they are caused by your disease rather than depression. Or you may think it is normal to be depressed when you have a chronic disease. If you are depressed you may:  · Feel sad or hopeless. · Feel guilty or worthless. · Not enjoy the things you used to enjoy. · Feel hopeless, as though life is not worth living. · Have trouble thinking or remembering. · Have low energy, and you may not eat or sleep well. · Pull away from others. · Think often about death or killing yourself. (Keep the numbers for these national suicide hotlines: 4-350-013-TALK [1-672.267.7928] and 8-277-EDSHIBB [1-906.417.7372]. )  Get treatment  By treating your depression, you can feel more hopeful and have more energy. If you feel better, you may take better care of yourself, so your health may improve. · Talk to your doctor if you have any changes in mood during treatment for your disease. · Ask your doctor for help.  Counseling, antidepressant medicine, or a combination of the two can help most people with depression. Often a combination works best. Counseling can also help you cope with having a chronic disease. When should you call for help? Call 911 anytime you think you may need emergency care. For example, call if:    · You feel like hurting yourself or someone else.     · Someone you know has depression and is about to attempt or is attempting suicide. Call your doctor now or seek immediate medical care if:    · You hear voices.     · Someone you know has depression and:  ? Starts to give away his or her possessions. ? Uses illegal drugs or drinks alcohol heavily. ? Talks or writes about death, including writing suicide notes or talking about guns, knives, or pills. ? Starts to spend a lot of time alone. ? Acts very aggressively or suddenly appears calm. Watch closely for changes in your health, and be sure to contact your doctor if:    · You do not get better as expected. Where can you learn more? Go to https://FIZZA.[employer]. org and sign in to your Rezora account. Enter X119 in the [employer] box to learn more about \"Depression and Chronic Disease: Care Instructions. \"     If you do not have an account, please click on the \"Sign Up Now\" link. Current as of: September 23, 2020               Content Version: 12.9  © 9426-2650 Healthwise, Incorporated. Care instructions adapted under license by [facility] ([facility]). If you have questions about a medical condition or this instruction, always ask your healthcare professional. [place] [address] any warranty or liability for your use of this information. Anxiety Disorder: Care Instructions  Your Care Instructions     Anxiety is a normal reaction to stress. Difficult situations can cause you to have symptoms such as sweaty palms and a nervous feeling. In an anxiety disorder, the symptoms are far more severe.  Constant worry, muscle tension, trouble sleeping, nausea and diarrhea, and other symptoms can make normal daily activities difficult or impossible. These symptoms may occur for no reason, and they can affect your work, school, or social life. Medicines, counseling, and self-care can all help. Follow-up care is a key part of your treatment and safety. Be sure to make and go to all appointments, and call your doctor if you are having problems. It's also a good idea to know your test results and keep a list of the medicines you take. How can you care for yourself at home? · Take medicines exactly as directed. Call your doctor if you think you are having a problem with your medicine. · Go to your counseling sessions and follow-up appointments. · Recognize and accept your anxiety. Then, when you are in a situation that makes you anxious, say to yourself, \"This is not an emergency. I feel uncomfortable, but I am not in danger. I can keep going even if I feel anxious. \"  · Be kind to your body:  ? Relieve tension with exercise or a massage. ? Get enough rest.  ? Avoid alcohol, caffeine, nicotine, and illegal drugs. They can increase your anxiety level and cause sleep problems. ? Learn and do relaxation techniques. See below for more about these techniques. · Engage your mind. Get out and do something you enjoy. Go to a funny movie, or take a walk or hike. Plan your day. Having too much or too little to do can make you anxious. · Keep a record of your symptoms. Discuss your fears with a good friend or family member, or join a support group for people with similar problems. Talking to others sometimes relieves stress. · Get involved in social groups, or volunteer to help others. Being alone sometimes makes things seem worse than they are. · Get at least 30 minutes of exercise on most days of the week to relieve stress. Walking is a good choice.  You also may want to do other activities, such as running, swimming, cycling, or playing tennis or team sports. Relaxation techniques  Do relaxation exercises 10 to 20 minutes a day. You can play soothing, relaxing music while you do them, if you wish. · Tell others in your house that you are going to do your relaxation exercises. Ask them not to disturb you. · Find a comfortable place, away from all distractions and noise. · Lie down on your back, or sit with your back straight. · Focus on your breathing. Make it slow and steady. · Breathe in through your nose. Breathe out through either your nose or mouth. · Breathe deeply, filling up the area between your navel and your rib cage. Breathe so that your belly goes up and down. · Do not hold your breath. · Breathe like this for 5 to 10 minutes. Notice the feeling of calmness throughout your whole body. As you continue to breathe slowly and deeply, relax by doing the following for another 5 to 10 minutes:  · Tighten and relax each muscle group in your body. You can begin at your toes and work your way up to your head. · Imagine your muscle groups relaxing and becoming heavy. · Empty your mind of all thoughts. · Let yourself relax more and more deeply. · Become aware of the state of calmness that surrounds you. · When your relaxation time is over, you can bring yourself back to alertness by moving your fingers and toes and then your hands and feet and then stretching and moving your entire body. Sometimes people fall asleep during relaxation, but they usually wake up shortly afterward. · Always give yourself time to return to full alertness before you drive a car or do anything that might cause an accident if you are not fully alert. Never play a relaxation tape while you drive a car. When should you call for help? Call 911 anytime you think you may need emergency care. For example, call if:    · You feel you cannot stop from hurting yourself or someone else.    Keep the numbers for these national suicide hotlines: 4-415-516-TALK (7-121.979.2973) and 3-898-LKVFKXB (6-620.569.5923). If you or someone you know talks about suicide or feeling hopeless, get help right away. Watch closely for changes in your health, and be sure to contact your doctor if:    · You have anxiety or fear that affects your life.     · You have symptoms of anxiety that are new or different from those you had before. Where can you learn more? Go to https://wongsang Worldwide.Bizdom. org and sign in to your Best Solar account. Enter P754 in the Adsvark box to learn more about \"Anxiety Disorder: Care Instructions. \"     If you do not have an account, please click on the \"Sign Up Now\" link. Current as of: September 23, 2020               Content Version: 12.9  © 4500-7740 Healthwise, Incorporated. Care instructions adapted under license by [facility] ([facility]). If you have questions about a medical condition or this instruction, always ask your healthcare professional. [place] [address] any warranty or liability for your use of this information.

## 2021-09-03 NOTE — PROGRESS NOTES
[facility] Progress Note    Date:9/3/2021         Patient Name:Harlan Abdalla     YOB: 1980     Age:40 y.o. Assessment/Plan        Problem List Items Addressed This Visit        Other    Generalized anxiety disorder - Primary      Borderline controlled, continue current medications, continue current treatment plan, medication adherence emphasized and lifestyle modifications recommended         Relevant Medications    FLUoxetine (PROZAC) 20 MG capsule    cloNIDine (CATAPRES) 0.1 MG tablet    Other Relevant Orders    Comprehensive Metabolic Panel    Moderate episode of recurrent major depressive disorder (HCC)      Borderline controlled, continue current medications, continue current treatment plan, medication adherence emphasized and lifestyle modifications recommended         Relevant Medications    FLUoxetine (PROZAC) 20 MG capsule    Other Relevant Orders    Comprehensive Metabolic Panel      Other Visit Diagnoses     Lipid screening        Relevant Orders    Lipid Panel    Diabetes mellitus screening        Relevant Orders    Comprehensive Metabolic Panel           Return in about 1 month (around 10/3/2021) for virtual visit. Electronically signed by Heavenly Baeza DO on 9/3/21         Subjective     Lena Pelaez is a 36 y.o. male presenting today for   Chief Complaint   Patient presents with   [facility]    Medication Refill   . Lena Pelaez is a 36 y.o. male who presents for follow up of anxiety disorder. Current symptoms: feelings of losing control, irritable. He denies current suicidal and homicidal ideation. He complains of the following side effects from the treatment: none. Lena Pelaez is a 36 y.o. male who presents for follow up of depression. Current symptoms include depressed mood. Symptoms have been stable since that time. Patient denies SI/HI. Previous treatment includes: medication.  He complains of the following side effects from the treatment: none.      Review of Systems   Review of Systems   All other systems reviewed and are negative. Medications     Current Outpatient Medications   Medication Sig Dispense Refill    cloNIDine (CATAPRES) 0.1 MG tablet Take 2 tablets by mouth 2 times daily 28 tablet 0    citalopram (CELEXA) 40 MG tablet Take 40 mg by mouth daily Indications: out of med        No current facility-administered medications for this visit. Past History    Past Medical History:   has a past medical history of ADHD (attention deficit hyperactivity disorder) and Depression. Social History:   reports that he has never smoked. He has never used smokeless tobacco. He reports that he does not drink alcohol and does not use drugs. Family History: No family history on file. Surgical History:   Past Surgical History:   Procedure Laterality Date    ADENOIDECTOMY AND TYMPANOSTOMY TUBE PLACEMENT      FACIAL COSMETIC SURGERY          Physical Examination      Vitals:  /70 (Site: Right Upper Arm, Position: Sitting, Cuff Size: Medium Adult)   Pulse 82   Wt 180 lb (81.6 kg)   SpO2 97%   BMI 26.58 kg/m²     Physical Exam  Vitals and nursing note reviewed. Constitutional:       General: He is not in acute distress. Appearance: Normal appearance. He is normal weight. He is not ill-appearing, toxic-appearing or diaphoretic. HENT:      Head: Normocephalic and atraumatic. Right Ear: External ear normal.      Left Ear: External ear normal.   Eyes:      General: No scleral icterus. Right eye: No discharge. Left eye: No discharge. Conjunctiva/sclera: Conjunctivae normal.   Cardiovascular:      Rate and Rhythm: Normal rate and regular rhythm. Pulses: Normal pulses. Heart sounds: Normal heart sounds. No murmur heard. No friction rub. No gallop. Pulmonary:      Effort: Pulmonary effort is normal. No respiratory distress. Breath sounds: Normal breath sounds. No stridor.  No wheezing, rhonchi or rales. Chest:      Chest wall: No tenderness. Skin:     General: Skin is warm. Coloration: Skin is not jaundiced or pale. Neurological:      Mental Status: He is alert and oriented to person, place, and time. Mental status is at baseline. Psychiatric:         Mood and Affect: Mood normal.         Behavior: Behavior normal.         Thought Content: Thought content normal.         Judgment: Judgment normal.         Labs/Imaging/Diagnostics   Labs:  No results found for: CMPWITHGFR, CBCAUTODIF, TSHFT4, LABA1C, LIPIDPAN    Imaging Last 24 Hours:  XR HAND LEFT (MIN 3 VIEWS)  Narrative: EXAMINATION:  3 XRAY VIEWS OF THE LEFT HAND    6/18/2019 9:49 pm    COMPARISON:  Left hand radiograph August 11, 2016    HISTORY:  ORDERING SYSTEM PROVIDED HISTORY: pain over 1st metacarpal  TECHNOLOGIST PROVIDED HISTORY:  pain over 1st metacarpal    FINDINGS:  Three views of the left hand demonstrate no acute fracture or dislocation. There are mild degenerative changes of the 1st carpometacarpal joint and  triscaphe joint. Mild degenerative change of the 1st and 2nd  metacarpophalangeal joints. Soft tissues appear unremarkable on radiograph. Impression: 1. No acute osseous abnormality identified. 2. Mild degenerative changes of the 1st MCP joint and 2nd MCP joint. 3. Mild degenerative changes of the 1st [facility] joint and triscaphe joint.

## 2021-10-07 ENCOUNTER — VIRTUAL VISIT (OUTPATIENT)
Dept: FAMILY MEDICINE CLINIC | Age: 41
End: 2021-10-07
Payer: COMMERCIAL

## 2021-10-07 DIAGNOSIS — F41.1 GENERALIZED ANXIETY DISORDER: Primary | ICD-10-CM

## 2021-10-07 DIAGNOSIS — F33.1 MODERATE EPISODE OF RECURRENT MAJOR DEPRESSIVE DISORDER (HCC): ICD-10-CM

## 2021-10-07 PROCEDURE — 99442 PR PHYS/QHP TELEPHONE EVALUATION 11-20 MIN: CPT | Performed by: FAMILY MEDICINE

## 2021-10-07 RX ORDER — CLONIDINE HYDROCHLORIDE 0.2 MG/1
0.2 TABLET ORAL 2 TIMES DAILY
Qty: 60 TABLET | Refills: 5 | Status: SHIPPED | OUTPATIENT
Start: 2021-10-07 | End: 2022-05-04 | Stop reason: SDUPTHER

## 2021-10-07 RX ORDER — FLUOXETINE HYDROCHLORIDE 40 MG/1
40 CAPSULE ORAL DAILY
Qty: 30 CAPSULE | Refills: 1 | Status: SHIPPED | OUTPATIENT
Start: 2021-10-07 | End: 2021-11-11 | Stop reason: ALTCHOICE

## 2021-10-22 NOTE — PATIENT INSTRUCTIONS
Patient Education        Anxiety Disorder: Care Instructions  Your Care Instructions     Anxiety is a normal reaction to stress. Difficult situations can cause you to have symptoms such as sweaty palms and a nervous feeling. In an anxiety disorder, the symptoms are far more severe. Constant worry, muscle tension, trouble sleeping, nausea and diarrhea, and other symptoms can make normal daily activities difficult or impossible. These symptoms may occur for no reason, and they can affect your work, school, or social life. Medicines, counseling, and self-care can all help. Follow-up care is a key part of your treatment and safety. Be sure to make and go to all appointments, and call your doctor if you are having problems. It's also a good idea to know your test results and keep a list of the medicines you take. How can you care for yourself at home? · Take medicines exactly as directed. Call your doctor if you think you are having a problem with your medicine. · Go to your counseling sessions and follow-up appointments. · Recognize and accept your anxiety. Then, when you are in a situation that makes you anxious, say to yourself, \"This is not an emergency. I feel uncomfortable, but I am not in danger. I can keep going even if I feel anxious. \"  · Be kind to your body:  ? Relieve tension with exercise or a massage. ? Get enough rest.  ? Avoid alcohol, caffeine, nicotine, and illegal drugs. They can increase your anxiety level and cause sleep problems. ? Learn and do relaxation techniques. See below for more about these techniques. · Engage your mind. Get out and do something you enjoy. Go to a funny movie, or take a walk or hike. Plan your day. Having too much or too little to do can make you anxious. · Keep a record of your symptoms. Discuss your fears with a good friend or family member, or join a support group for people with similar problems. Talking to others sometimes relieves stress.   · Get involved in play a relaxation tape while you drive a car. When should you call for help? Call 911 anytime you think you may need emergency care. For example, call if:    · You feel you cannot stop from hurting yourself or someone else. Keep the numbers for these national suicide hotlines: 7-075-066-TALK (1-499.906.4373) and 6-304-AAAQXPF (7-658.178.2421). If you or someone you know talks about suicide or feeling hopeless, get help right away. Watch closely for changes in your health, and be sure to contact your doctor if:    · You have anxiety or fear that affects your life.     · You have symptoms of anxiety that are new or different from those you had before. Where can you learn more? Go to https://StockTwits.[employer]. org and sign in to your [employer] account. Enter P754 in the English TV box to learn more about \"Anxiety Disorder: Care Instructions. \"     If you do not have an account, please click on the \"Sign Up Now\" link. Current as of: September 23, 2020               Content Version: 12.9  © 9340-5157 Healthwise, Med.ly. Care instructions adapted under license by [address] If you have questions about a medical condition or this instruction, always ask your healthcare professional. [address] any warranty or liability for your use of this information.

## 2021-10-22 NOTE — PROGRESS NOTES
Conrad Cha is a 36 y.o. male evaluated via telephone on 10/7/2021. Consent:  He and/or health care decision maker is aware that that he may receive a bill for this telephone service, depending on his insurance coverage, and has provided verbal consent to proceed: Yes      Documentation:  I communicated with the patient and/or health care decision maker about  Anxiety/depression. Details of this discussion including any medical advice provided:     Problem List Items Addressed This Visit        Other    Generalized anxiety disorder - Primary      Well-controlled, continue current medications and continue current treatment plan   Encouraged CBT         Relevant Medications    FLUoxetine (PROZAC) 40 MG capsule    cloNIDine (CATAPRES) 0.2 MG tablet    Moderate episode of recurrent major depressive disorder (HCC)       Well-controlled, continue current medications and continue current treatment plan   Encouraged CBT         Relevant Medications    FLUoxetine (PROZAC) 40 MG capsule            I affirm this is a Patient Initiated Episode with a Patient who has not had a related appointment within my department in the past 7 days or scheduled within the next 24 hours. Patient identification was verified at the start of the visit: Yes    Total Time: minutes: 11-20 minutes    The visit was conducted pursuant to the emergency declaration under the [facility], [address] [place] [address] authority and the [employer] Act. Patient identification was verified, and a caregiver was present when appropriate. The patient was located in a state where the provider was credentialed to provide care.     Note: not billable if this call serves to triage the patient into an appointment for the relevant concern      Gareth Werner DO

## 2021-11-10 ENCOUNTER — TELEPHONE (OUTPATIENT)
Dept: FAMILY MEDICINE CLINIC | Age: 41
End: 2021-11-10

## 2021-11-10 NOTE — TELEPHONE ENCOUNTER
----- Message from Hugo Ferreira sent at 11/10/2021 11:04 AM EST -----  Subject: Appointment Request    Reason for Call: Routine Existing Condition Follow Up    QUESTIONS  Type of Appointment? Established Patient  Reason for appointment request? Available appointments did not meet   patient need  Additional Information for Provider? Patient needs a follow up, the only   appointments available are in December. He is okay seeing someone else but   would like an appointment sooner. Please call back to schedule.  ---------------------------------------------------------------------------  --------------  CALL BACK INFO  What is the best way for the office to contact you? OK to leave message on   voicemail  Preferred Call Back Phone Number? [phone]  ---------------------------------------------------------------------------  --------------  SCRIPT ANSWERS  Relationship to Patient? Self  (Is the patient requesting to be seen urgently for their symptoms?)? No  Is this follow up request related to routine Diabetes Management? No  Are you having any new concerns about your existing condition? No  Have you been diagnosed with, awaiting test results for, or told that you   are suspected of having COVID-19 (Coronavirus)? (If patient has tested   negative or was tested as a requirement for work, school, or travel and   not based on symptoms, answer no)? No  Within the past two weeks have you developed any of the following symptoms   (answer no if symptoms have been present longer than 2 weeks or began   more than 2 weeks ago)? Fever or Chills, Cough, Shortness of breath or   difficulty breathing, Loss of taste or smell, Sore throat, Nasal   congestion, Sneezing or runny nose, Fatigue or generalized body aches   (answer no if pain is specific to a body part e.g. back pain), Diarrhea,   Headache? No  Have you had close contact with someone with COVID-19 in the last 14 days?    No  (Service Expert  click yes below to proceed with [employer] As Usual   Scheduling)?  Yes

## 2021-11-11 ENCOUNTER — OFFICE VISIT (OUTPATIENT)
Dept: FAMILY MEDICINE CLINIC | Age: 41
End: 2021-11-11
Payer: COMMERCIAL

## 2021-11-11 VITALS
DIASTOLIC BLOOD PRESSURE: 74 MMHG | TEMPERATURE: 98.2 F | SYSTOLIC BLOOD PRESSURE: 112 MMHG | OXYGEN SATURATION: 98 % | BODY MASS INDEX: 25.84 KG/M2 | WEIGHT: 175 LBS | HEART RATE: 84 BPM

## 2021-11-11 DIAGNOSIS — F41.1 GENERALIZED ANXIETY DISORDER: ICD-10-CM

## 2021-11-11 DIAGNOSIS — F33.1 MODERATE EPISODE OF RECURRENT MAJOR DEPRESSIVE DISORDER (HCC): Primary | ICD-10-CM

## 2021-11-11 DIAGNOSIS — F90.9 ATTENTION DEFICIT HYPERACTIVITY DISORDER (ADHD), UNSPECIFIED ADHD TYPE: ICD-10-CM

## 2021-11-11 PROCEDURE — 99214 OFFICE O/P EST MOD 30 MIN: CPT | Performed by: STUDENT IN AN ORGANIZED HEALTH CARE EDUCATION/TRAINING PROGRAM

## 2021-11-11 RX ORDER — DEXTROAMPHETAMINE SACCHARATE, AMPHETAMINE ASPARTATE MONOHYDRATE, DEXTROAMPHETAMINE SULFATE AND AMPHETAMINE SULFATE 3.75; 3.75; 3.75; 3.75 MG/1; MG/1; MG/1; MG/1
15 CAPSULE, EXTENDED RELEASE ORAL EVERY MORNING
COMMUNITY
End: 2021-11-11

## 2021-11-11 RX ORDER — DEXTROAMPHETAMINE SACCHARATE, AMPHETAMINE ASPARTATE MONOHYDRATE, DEXTROAMPHETAMINE SULFATE AND AMPHETAMINE SULFATE 5; 5; 5; 5 MG/1; MG/1; MG/1; MG/1
20 CAPSULE, EXTENDED RELEASE ORAL EVERY MORNING
Qty: 30 CAPSULE | Refills: 0 | Status: SHIPPED | OUTPATIENT
Start: 2021-11-11 | End: 2021-12-10 | Stop reason: SDUPTHER

## 2021-11-11 RX ORDER — ESCITALOPRAM OXALATE 20 MG/1
20 TABLET ORAL DAILY
Qty: 30 TABLET | Refills: 3 | Status: SHIPPED | OUTPATIENT
Start: 2021-11-11 | End: 2021-12-07 | Stop reason: SDUPTHER

## 2021-11-11 RX ORDER — ESCITALOPRAM OXALATE 10 MG/1
10 TABLET ORAL DAILY
COMMUNITY
End: 2021-11-11

## 2021-11-11 ASSESSMENT — ENCOUNTER SYMPTOMS
ABDOMINAL PAIN: 0
COLOR CHANGE: 0
NAUSEA: 0
VOMITING: 0
TROUBLE SWALLOWING: 0

## 2021-11-11 ASSESSMENT — PATIENT HEALTH QUESTIONNAIRE - PHQ9
SUM OF ALL RESPONSES TO PHQ QUESTIONS 1-9: 1
SUM OF ALL RESPONSES TO PHQ QUESTIONS 1-9: 1
SUM OF ALL RESPONSES TO PHQ9 QUESTIONS 1 & 2: 1
2. FEELING DOWN, DEPRESSED OR HOPELESS: 1
1. LITTLE INTEREST OR PLEASURE IN DOING THINGS: 0
SUM OF ALL RESPONSES TO PHQ QUESTIONS 1-9: 1

## 2021-11-11 NOTE — PROGRESS NOTES
Subjective:  Melisa Benitez presents for   Chief Complaint   Patient presents with    Anxiety     wants medication increase. [facility] has been prescribing           Here for establishing care and wants medication increase. Does follow with [facility] for his medications, but due to issues with appt he wants to be switched to [facility]. Previously on Adderall 30 mg XR and is currently on the 15 mg. Wanting this to be increased slightly. Has previously tried multiple SSRIs for his anxiety and depression. Has tried prozac, but felt to numb. Celexa worked well in the past.     Patient Active Problem List   Diagnosis    Generalized anxiety disorder    Moderate episode of recurrent major depressive disorder (HCC)    Attention deficit hyperactivity disorder (ADHD)         Review of Systems   Constitutional: Negative for appetite change, chills, fatigue and fever. HENT: Negative for congestion and trouble swallowing. Cardiovascular: Negative. Gastrointestinal: Negative for abdominal pain, nausea and vomiting. Genitourinary: Negative for difficulty urinating, dysuria and flank pain. Musculoskeletal: Negative for arthralgias. Skin: Negative for color change. Neurological: Negative for dizziness and headaches. Psychiatric/Behavioral: Negative for behavioral problems and confusion. The patient is not nervous/anxious. Objective:  Physical Exam   Vitals:   Vitals:    11/11/21 1506   BP: 112/74   Site: Left Upper Arm   Position: Sitting   Cuff Size: Medium Adult   Pulse: 84   Temp: 98.2 °F (36.8 °C)   TempSrc: Temporal   SpO2: 98%   Weight: 175 lb (79.4 kg)     Wt Readings from Last 3 Encounters:   11/11/21 175 lb (79.4 kg)   09/03/21 180 lb (81.6 kg)   09/01/21 175 lb (79.4 kg)     Ht Readings from Last 3 Encounters:   09/01/21 5' 9\" (1.753 m)   08/21/20 5' 9\" (1.753 m)   03/20/20 5' 9\" (1.753 m)     Body mass index is 25.84 kg/m².     Physical Exam  Vitals reviewed. Constitutional:       General: He is not in acute distress. Appearance: Normal appearance. HENT:      Mouth/Throat:      Mouth: Mucous membranes are moist.   Eyes:      Conjunctiva/sclera: Conjunctivae normal.   Skin:     General: Skin is warm and dry. Neurological:      Mental Status: He is alert and oriented to person, place, and time. Psychiatric:         Mood and Affect: Mood normal.            Assessment/Plan:    Diagnosis Orders   1. Moderate episode of recurrent major depressive disorder (HCC)  escitalopram (LEXAPRO) 20 MG tablet   2. Attention deficit hyperactivity disorder (ADHD), unspecified ADHD type  amphetamine-dextroamphetamine (ADDERALL XR) 20 MG extended release capsule   3. Generalized anxiety disorder  escitalopram (LEXAPRO) 20 MG tablet        Return in about 4 weeks (around 12/9/2021) for ADHD and medication. Will go ahead and increase the lexapro to 20 mg. Will also increase Adderall to 20 mg XR. Reassess in one month for tolerance and effect. Controlled Substance Monitoring:    Acute and Chronic Pain Monitoring:   RX Monitoring 11/11/2021   Attestation -   Periodic Controlled Substance Monitoring No signs of potential drug abuse or diversion identified.

## 2021-12-07 DIAGNOSIS — F33.1 MODERATE EPISODE OF RECURRENT MAJOR DEPRESSIVE DISORDER (HCC): ICD-10-CM

## 2021-12-07 DIAGNOSIS — F41.1 GENERALIZED ANXIETY DISORDER: ICD-10-CM

## 2021-12-07 RX ORDER — ESCITALOPRAM OXALATE 20 MG/1
20 TABLET ORAL DAILY
Qty: 30 TABLET | Refills: 3 | Status: SHIPPED | OUTPATIENT
Start: 2021-12-07 | End: 2022-01-26 | Stop reason: SDUPTHER

## 2021-12-07 NOTE — TELEPHONE ENCOUNTER
----- Message from nicko Godfrey sent at 12/7/2021  1:51 PM EST -----  Subject: Refill Request    QUESTIONS  Name of Medication? escitalopram (LEXAPRO) 20 MG tablet  Patient-reported dosage and instructions? 20 mg  How many days do you have left? 0  Preferred Pharmacy? [address] [facility]  Pharmacy phone number (if available)? [phone]  ---------------------------------------------------------------------------  --------------  CALL BACK INFO  What is the best way for the office to contact you? OK to leave message on   voicemail  Preferred Call Back Phone Number?  [phone]

## 2021-12-10 ENCOUNTER — VIRTUAL VISIT (OUTPATIENT)
Dept: FAMILY MEDICINE CLINIC | Age: 41
End: 2021-12-10
Payer: COMMERCIAL

## 2021-12-10 DIAGNOSIS — F90.9 ATTENTION DEFICIT HYPERACTIVITY DISORDER (ADHD), UNSPECIFIED ADHD TYPE: ICD-10-CM

## 2021-12-10 DIAGNOSIS — F33.1 MODERATE EPISODE OF RECURRENT MAJOR DEPRESSIVE DISORDER (HCC): Primary | ICD-10-CM

## 2021-12-10 PROCEDURE — 99421 OL DIG E/M SVC 5-10 MIN: CPT | Performed by: STUDENT IN AN ORGANIZED HEALTH CARE EDUCATION/TRAINING PROGRAM

## 2021-12-10 PROCEDURE — G2012 BRIEF CHECK IN BY MD/QHP: HCPCS | Performed by: STUDENT IN AN ORGANIZED HEALTH CARE EDUCATION/TRAINING PROGRAM

## 2021-12-10 RX ORDER — DEXTROAMPHETAMINE SACCHARATE, AMPHETAMINE ASPARTATE MONOHYDRATE, DEXTROAMPHETAMINE SULFATE AND AMPHETAMINE SULFATE 5; 5; 5; 5 MG/1; MG/1; MG/1; MG/1
20 CAPSULE, EXTENDED RELEASE ORAL EVERY MORNING
Qty: 30 CAPSULE | Refills: 0 | Status: SHIPPED | OUTPATIENT
Start: 2021-12-10 | End: 2022-01-04 | Stop reason: SDUPTHER

## 2021-12-10 ASSESSMENT — ENCOUNTER SYMPTOMS
TROUBLE SWALLOWING: 0
ABDOMINAL PAIN: 0
NAUSEA: 0
COLOR CHANGE: 0
VOMITING: 0

## 2021-12-10 NOTE — PROGRESS NOTES
Radha Cisneros (:  1980) is a 39 y.o. male,Established patient, here for evaluation of the following chief complaint(s): 1 Month Follow-Up (ADHD refills)         ASSESSMENT/PLAN:  1. Moderate episode of recurrent major depressive disorder ([facility] [address]  2. Attention deficit hyperactivity disorder (ADHD), unspecified ADHD type  -     amphetamine-dextroamphetamine (ADDERALL XR) 20 MG extended release capsule; Take 1 capsule by mouth every morning for 30 days. , Disp-30 capsule, R-0Normal      Return in about 4 weeks (around 2022). Doing well on the medications. Will refill his Adderall. Reassess in one month. Controlled Substance Monitoring:    Acute and Chronic Pain Monitoring:   RX Monitoring 12/10/2021   Attestation -   Periodic Controlled Substance Monitoring No signs of potential drug abuse or diversion identified. SUBJECTIVE/OBJECTIVE:  Visit in regards to ADHD and depression. Doing well on both medications and denies any side effects or decreased therapeutic effect. Review of Systems   Constitutional: Negative for appetite change, chills, fatigue and fever. HENT: Negative for congestion and trouble swallowing. Cardiovascular: Negative. Gastrointestinal: Negative for abdominal pain, nausea and vomiting. Genitourinary: Negative for difficulty urinating, dysuria and flank pain. Musculoskeletal: Negative for arthralgias. Skin: Negative for color change. Neurological: Negative for dizziness and headaches. Psychiatric/Behavioral: Negative for behavioral problems and confusion. The patient is not nervous/anxious. No flowsheet data found.      Physical Exam    [INSTRUCTIONS:  \"[x]\" Indicates a positive item  \"[]\" Indicates a negative item  -- DELETE ALL ITEMS NOT EXAMINED]    Constitutional: [x] Appears well-developed and well-nourished [x] No apparent distress      [] Abnormal -     Mental status: [x] Alert and awake  [x] Oriented to person/place/time [x] Able to follow commands    [] Abnormal -                Psychiatric:       [x] Normal Affect [] Abnormal -        [x] No Hallucinations    Other pertinent observable physical exam findings:-          On this date 12/10/2021 I have spent 8 minutes reviewing previous notes, test results and face to face (virtual) with the patient discussing the diagnosis and importance of compliance with the treatment plan as well as documenting on the day of the visit. Leroy Laura, was evaluated through a synchronous (real-time) audio-video encounter. The patient (or guardian if applicable) is aware that this is a billable service. Verbal consent to proceed has been obtained within the past 12 months. The visit was conducted pursuant to the emergency declaration under the [address] [place] [address] authority and the [employer] General Act. Patient identification was verified, and a caregiver was present when appropriate. The patient was located in a state where the provider was credentialed to provide care. An electronic signature was used to authenticate this note.     --Abbey Meza MD

## 2022-01-04 DIAGNOSIS — F90.9 ATTENTION DEFICIT HYPERACTIVITY DISORDER (ADHD), UNSPECIFIED ADHD TYPE: ICD-10-CM

## 2022-01-04 RX ORDER — DEXTROAMPHETAMINE SACCHARATE, AMPHETAMINE ASPARTATE MONOHYDRATE, DEXTROAMPHETAMINE SULFATE AND AMPHETAMINE SULFATE 5; 5; 5; 5 MG/1; MG/1; MG/1; MG/1
20 CAPSULE, EXTENDED RELEASE ORAL EVERY MORNING
Qty: 30 CAPSULE | Refills: 0 | Status: SHIPPED | OUTPATIENT
Start: 2022-01-04 | End: 2022-01-26 | Stop reason: SDUPTHER

## 2022-01-04 NOTE — TELEPHONE ENCOUNTER
----- Message from Kyra Hu sent at 1/4/2022  3:12 PM EST -----  Subject: Refill Request    QUESTIONS  Name of Medication? amphetamine-dextroamphetamine (ADDERALL XR) 20 MG   extended release capsule  Patient-reported dosage and instructions? 20MG one time daily  How many days do you have left? 0  Preferred Pharmacy? [address] [facility]  Pharmacy phone number (if available)? [phone]  Additional Information for Provider? Patient is asking if PCP will refill   Adderall because he will run out before appointment on the 10th.   ---------------------------------------------------------------------------  --------------  CALL BACK INFO  What is the best way for the office to contact you? OK to leave message on   voicemail  Preferred Call Back Phone Number?  [phone]

## 2022-01-04 NOTE — TELEPHONE ENCOUNTER
Sallie Saavedra is calling to request a refill on the following medication(s):    Medication Request:  Requested Prescriptions     Pending Prescriptions Disp Refills    amphetamine-dextroamphetamine (ADDERALL XR) 20 MG extended release capsule 30 capsule 0     Sig: Take 1 capsule by mouth every morning for 30 days.        Last Visit Date (If Applicable):  97/86/9791    Next Visit Date:    1/10/2022

## 2022-01-10 ENCOUNTER — VIRTUAL VISIT (OUTPATIENT)
Dept: FAMILY MEDICINE CLINIC | Age: 42
End: 2022-01-10
Payer: COMMERCIAL

## 2022-01-10 DIAGNOSIS — F90.2 ATTENTION DEFICIT HYPERACTIVITY DISORDER (ADHD), COMBINED TYPE: ICD-10-CM

## 2022-01-10 DIAGNOSIS — F33.1 MODERATE EPISODE OF RECURRENT MAJOR DEPRESSIVE DISORDER (HCC): Primary | ICD-10-CM

## 2022-01-10 PROCEDURE — 99421 OL DIG E/M SVC 5-10 MIN: CPT | Performed by: STUDENT IN AN ORGANIZED HEALTH CARE EDUCATION/TRAINING PROGRAM

## 2022-01-10 ASSESSMENT — ENCOUNTER SYMPTOMS
NAUSEA: 0
COLOR CHANGE: 0
ABDOMINAL PAIN: 0
VOMITING: 0
TROUBLE SWALLOWING: 0

## 2022-01-10 NOTE — PROGRESS NOTES
Soo Arevalo (:  1980) is a 39 y.o. male,Established patient, here for evaluation of the following chief complaint(s): Other (Medications)         ASSESSMENT/PLAN:  1. Moderate episode of recurrent major depressive disorder ([facility] [address]  2. Attention deficit hyperactivity disorder (ADHD), combined type      Return in about 4 months (around 5/10/2022). Doing well on the medications. Already refilled his Adderall. Reassess in office in four months. Controlled Substance Monitoring:    Acute and Chronic Pain Monitoring:   RX Monitoring 1/10/2022   Attestation -   Periodic Controlled Substance Monitoring No signs of potential drug abuse or diversion identified. SUBJECTIVE/OBJECTIVE:  Telephone visit in regards to ADHD and depression. Doing well on both medications and denies any side effects or decreased therapeutic effect. Review of Systems   Constitutional: Negative for appetite change, chills, fatigue and fever. HENT: Negative for congestion and trouble swallowing. Cardiovascular: Negative. Gastrointestinal: Negative for abdominal pain, nausea and vomiting. Genitourinary: Negative for difficulty urinating, dysuria and flank pain. Musculoskeletal: Negative for arthralgias. Skin: Negative for color change. Neurological: Negative for dizziness and headaches. Psychiatric/Behavioral: Negative for behavioral problems and confusion. The patient is not nervous/anxious. No flowsheet data found.      Physical Exam    [INSTRUCTIONS:  \"[x]\" Indicates a positive item  \"[]\" Indicates a negative item  -- DELETE ALL ITEMS NOT EXAMINED]    Constitutional: [x] Appears well-developed and well-nourished [x] No apparent distress      [] Abnormal -     Mental status: [x] Alert and awake  [x] Oriented to person/place/time [x] Able to follow commands    [] Abnormal -                Psychiatric:       [x] Normal Affect [] Abnormal -        [x] No Hallucinations    Other pertinent observable

## 2022-01-26 DIAGNOSIS — F90.9 ATTENTION DEFICIT HYPERACTIVITY DISORDER (ADHD), UNSPECIFIED ADHD TYPE: ICD-10-CM

## 2022-01-26 DIAGNOSIS — F33.1 MODERATE EPISODE OF RECURRENT MAJOR DEPRESSIVE DISORDER (HCC): ICD-10-CM

## 2022-01-26 DIAGNOSIS — F41.1 GENERALIZED ANXIETY DISORDER: ICD-10-CM

## 2022-01-26 RX ORDER — DEXTROAMPHETAMINE SACCHARATE, AMPHETAMINE ASPARTATE MONOHYDRATE, DEXTROAMPHETAMINE SULFATE AND AMPHETAMINE SULFATE 5; 5; 5; 5 MG/1; MG/1; MG/1; MG/1
20 CAPSULE, EXTENDED RELEASE ORAL EVERY MORNING
Qty: 30 CAPSULE | Refills: 0 | Status: ON HOLD | OUTPATIENT
Start: 2022-01-26 | End: 2022-06-02

## 2022-01-26 RX ORDER — ESCITALOPRAM OXALATE 20 MG/1
20 TABLET ORAL DAILY
Qty: 30 TABLET | Refills: 5 | Status: SHIPPED | OUTPATIENT
Start: 2022-01-26 | End: 2022-05-04 | Stop reason: SDUPTHER

## 2022-01-26 NOTE — TELEPHONE ENCOUNTER
Lovell Curling is calling to request a refill on the following medication(s):    Medication Request:  Requested Prescriptions     Pending Prescriptions Disp Refills    escitalopram (LEXAPRO) 20 MG tablet 30 tablet 5     Sig: Take 1 tablet by mouth daily    amphetamine-dextroamphetamine (ADDERALL XR) 20 MG extended release capsule 30 capsule 0     Sig: Take 1 capsule by mouth every morning for 30 days.        Last Visit Date (If Applicable):  6/82/8859    Next Visit Date:    5/10/2022

## 2022-02-28 ENCOUNTER — TELEPHONE (OUTPATIENT)
Dept: FAMILY MEDICINE CLINIC | Age: 42
End: 2022-02-28

## 2022-02-28 DIAGNOSIS — F90.9 ATTENTION DEFICIT HYPERACTIVITY DISORDER (ADHD), UNSPECIFIED ADHD TYPE: Primary | ICD-10-CM

## 2022-02-28 RX ORDER — DEXTROAMPHETAMINE SACCHARATE, AMPHETAMINE ASPARTATE MONOHYDRATE, DEXTROAMPHETAMINE SULFATE AND AMPHETAMINE SULFATE 6.25; 6.25; 6.25; 6.25 MG/1; MG/1; MG/1; MG/1
25 CAPSULE, EXTENDED RELEASE ORAL EVERY MORNING
Qty: 30 CAPSULE | Refills: 0 | Status: ON HOLD | OUTPATIENT
Start: 2022-02-28 | End: 2022-06-02

## 2022-02-28 NOTE — TELEPHONE ENCOUNTER
----- Message from Adam Monge sent at 2/28/2022  8:29 AM EST -----  Subject: Message to Provider    QUESTIONS  Information for Provider? Patient is currently taking   amphetamine-dextroamphetamine (ADDERALL XR) 20 MG extended release   capsule. He is wanting to know if it can be changed to 25 MG or 30MG. He   says the 20 is no longer working. please advise   ---------------------------------------------------------------------------  --------------  CALL BACK INFO  What is the best way for the office to contact you? OK to leave message on   voicemail  Preferred Call Back Phone Number? [phone]  ---------------------------------------------------------------------------  --------------  SCRIPT ANSWERS  Relationship to Patient?  Self

## 2022-03-21 ENCOUNTER — TELEPHONE (OUTPATIENT)
Dept: FAMILY MEDICINE CLINIC | Age: 42
End: 2022-03-21

## 2022-03-21 DIAGNOSIS — F90.2 ATTENTION DEFICIT HYPERACTIVITY DISORDER (ADHD), COMBINED TYPE: Primary | ICD-10-CM

## 2022-03-21 DIAGNOSIS — F90.9 ATTENTION DEFICIT HYPERACTIVITY DISORDER (ADHD), UNSPECIFIED ADHD TYPE: ICD-10-CM

## 2022-03-21 RX ORDER — DEXTROAMPHETAMINE SACCHARATE, AMPHETAMINE ASPARTATE MONOHYDRATE, DEXTROAMPHETAMINE SULFATE AND AMPHETAMINE SULFATE 7.5; 7.5; 7.5; 7.5 MG/1; MG/1; MG/1; MG/1
30 CAPSULE, EXTENDED RELEASE ORAL DAILY
Qty: 30 CAPSULE | Refills: 0 | Status: SHIPPED | OUTPATIENT
Start: 2022-03-21 | End: 2022-04-12 | Stop reason: SDUPTHER

## 2022-03-21 NOTE — TELEPHONE ENCOUNTER
----- Message from Radha Amber sent at 3/21/2022  8:00 AM EDT -----  Subject: Refill Request    QUESTIONS  Name of Medication? amphetamine-dextroamphetamine (ADDERALL XR) 25 MG   extended release capsule  Patient-reported dosage and instructions? 1 daily  How many days do you have left? 0  Preferred Pharmacy? [address] [facility]  Pharmacy phone number (if available)? [phone]  Additional Information for Provider? patient has a few pills left and   request for increase to 30 mg adderall , back in november was on 20mg,   last month request for increase dose for 30 mg and Dr. Steffany Snow put patient   on 25 mg  ---------------------------------------------------------------------------  --------------,  Name of Medication? escitalopram (LEXAPRO) 20 MG tablet  Patient-reported dosage and instructions? 1 daily  How many days do you have left? 0  Preferred Pharmacy? [facility]  Pharmacy phone number (if available)? [phone]  Additional Information for Provider? patient has a few days left not sure   exact amount   ---------------------------------------------------------------------------  --------------  CALL BACK INFO  What is the best way for the office to contact you? OK to leave message on   voicemail  Preferred Call Back Phone Number?  [phone]

## 2022-04-11 DIAGNOSIS — F90.9 ATTENTION DEFICIT HYPERACTIVITY DISORDER (ADHD), UNSPECIFIED ADHD TYPE: ICD-10-CM

## 2022-04-11 NOTE — TELEPHONE ENCOUNTER
Medication not due until next week.  This and the antibiotic request can wait for Dr. Angie Schroeder tomorrow

## 2022-04-11 NOTE — TELEPHONE ENCOUNTER
----- Message from Kaitlynn Star sent at 4/11/2022 11:38 AM EDT -----  Subject: Refill Request    QUESTIONS  Name of Medication? Other - Antibiotic  Patient-reported dosage and instructions? n/a  How many days do you have left? 0  Preferred Pharmacy? [address] [facility]  Pharmacy phone number (if available)? [phone]  Additional Information for Provider? Patient believes he has a mouth   infection from a root canal 6 months ago, requesting antibiotics to treat   while his dental insurance is lapsed. ---------------------------------------------------------------------------  --------------,  Name of Medication? amphetamine-dextroamphetamine (ADDERALL XR) 30 MG   extended release capsule  Patient-reported dosage and instructions? 30mg once daily  How many days do you have left? 6  Preferred Pharmacy? [address] [facility]  Pharmacy phone number (if available)? [phone]  Additional Information for Provider? Told to call in a week before they   ran out.  ---------------------------------------------------------------------------  --------------  [address]  What is the best way for the office to contact you? Do not leave any   message, patient will call back for answer  Preferred Call Back Phone Number? [phone]  ---------------------------------------------------------------------------  --------------  SCRIPT ANSWERS  Relationship to Patient?  Self

## 2022-04-12 RX ORDER — DEXTROAMPHETAMINE SACCHARATE, AMPHETAMINE ASPARTATE MONOHYDRATE, DEXTROAMPHETAMINE SULFATE AND AMPHETAMINE SULFATE 7.5; 7.5; 7.5; 7.5 MG/1; MG/1; MG/1; MG/1
30 CAPSULE, EXTENDED RELEASE ORAL DAILY
Qty: 30 CAPSULE | Refills: 0 | Status: SHIPPED | OUTPATIENT
Start: 2022-04-12 | End: 2022-05-04 | Stop reason: SDUPTHER

## 2022-04-16 ENCOUNTER — APPOINTMENT (OUTPATIENT)
Dept: GENERAL RADIOLOGY | Age: 42
End: 2022-04-16
Payer: COMMERCIAL

## 2022-04-16 ENCOUNTER — HOSPITAL ENCOUNTER (EMERGENCY)
Age: 42
Discharge: HOME | End: 2022-04-16
Attending: EMERGENCY MEDICINE
Payer: COMMERCIAL

## 2022-04-16 VITALS
WEIGHT: 180 LBS | RESPIRATION RATE: 16 BRPM | HEIGHT: 69 IN | TEMPERATURE: 98.9 F | OXYGEN SATURATION: 97 % | SYSTOLIC BLOOD PRESSURE: 109 MMHG | BODY MASS INDEX: 26.66 KG/M2 | HEART RATE: 120 BPM | DIASTOLIC BLOOD PRESSURE: 76 MMHG

## 2022-04-16 DIAGNOSIS — M79.89 THUMB SWELLING: Primary | ICD-10-CM

## 2022-04-16 PROCEDURE — 6370000000 HC RX 637 (ALT 250 FOR IP): Performed by: PHYSICIAN ASSISTANT

## 2022-04-16 PROCEDURE — 99283 EMERGENCY DEPT VISIT LOW MDM: CPT

## 2022-04-16 PROCEDURE — 73130 X-RAY EXAM OF HAND: CPT

## 2022-04-16 RX ORDER — IBUPROFEN 800 MG/1
800 TABLET ORAL ONCE
Status: COMPLETED | OUTPATIENT
Start: 2022-04-16 | End: 2022-04-16

## 2022-04-16 RX ORDER — NAPROXEN 500 MG/1
500 TABLET ORAL 2 TIMES DAILY WITH MEALS
Qty: 20 TABLET | Refills: 0 | Status: ON HOLD | OUTPATIENT
Start: 2022-04-16 | End: 2022-06-02

## 2022-04-16 RX ORDER — CEPHALEXIN 500 MG/1
500 CAPSULE ORAL 4 TIMES DAILY
Qty: 40 CAPSULE | Refills: 0 | Status: SHIPPED | OUTPATIENT
Start: 2022-04-16 | End: 2022-04-26

## 2022-04-16 RX ORDER — ACETAMINOPHEN AND CODEINE PHOSPHATE 300; 30 MG/1; MG/1
1-2 TABLET ORAL 3 TIMES DAILY PRN
Qty: 12 TABLET | Refills: 0 | Status: SHIPPED | OUTPATIENT
Start: 2022-04-16 | End: 2022-04-19

## 2022-04-16 RX ADMIN — IBUPROFEN 800 MG: 800 TABLET, FILM COATED ORAL at 17:47

## 2022-04-16 ASSESSMENT — PAIN DESCRIPTION - LOCATION: LOCATION: FINGER (COMMENT WHICH ONE)

## 2022-04-16 ASSESSMENT — PAIN DESCRIPTION - DESCRIPTORS: DESCRIPTORS: THROBBING;CONSTANT

## 2022-04-16 ASSESSMENT — PAIN DESCRIPTION - FREQUENCY: FREQUENCY: CONTINUOUS

## 2022-04-16 ASSESSMENT — PAIN DESCRIPTION - ORIENTATION: ORIENTATION: LEFT

## 2022-04-16 ASSESSMENT — PAIN - FUNCTIONAL ASSESSMENT: PAIN_FUNCTIONAL_ASSESSMENT: 0-10

## 2022-04-16 ASSESSMENT — PAIN SCALES - GENERAL: PAINLEVEL_OUTOF10: 8

## 2022-04-16 NOTE — ED PROVIDER NOTES
[address] [place] [address] ED  [facility]      Pt Name: Bryson Pacheco  MRN: [id number]  Muraligfjones 1980  Date ofevaluation: 4/16/2022  Provider: Giovanni De Leon PA-C    CHIEF COMPLAINT       Chief Complaint   Patient presents with    Foreign Body     left thumb states wood or metal         HISTORY OF PRESENT ILLNESS  (Location/Symptom, Timing/Onset, Context/Setting, Quality, Duration, Modifying Factors, Severity.)   Bryson Pacheco is a 39 y.o. male who presents to the emergency department with left thumb pain. Patient thinks he may have. Work but is not sure. Also wants make sure he does not have a foreign body inside the left thumb. Pain is to the distalmost portion of the palmar surface of the thumb. No fevers or chills. No nausea vomiting. No other complaints. Nursing Notes were reviewed. ALLERGIES     Codeine    CURRENT MEDICATIONS       Previous Medications    AMPHETAMINE-DEXTROAMPHETAMINE (ADDERALL XR) 20 MG EXTENDED RELEASE CAPSULE    Take 1 capsule by mouth every morning for 30 days. AMPHETAMINE-DEXTROAMPHETAMINE (ADDERALL XR) 25 MG EXTENDED RELEASE CAPSULE    Take 1 capsule by mouth every morning for 30 days. AMPHETAMINE-DEXTROAMPHETAMINE (ADDERALL XR) 30 MG EXTENDED RELEASE CAPSULE    Take 1 capsule by mouth daily for 30 days. CLONIDINE (CATAPRES) 0.2 MG TABLET    Take 1 tablet by mouth 2 times daily    ESCITALOPRAM (LEXAPRO) 20 MG TABLET    Take 1 tablet by mouth daily       PAST MEDICAL HISTORY         Diagnosis Date    ADHD (attention deficit hyperactivity disorder)     Depression        SURGICAL HISTORY           Procedure Laterality Date    ADENOIDECTOMY AND TYMPANOSTOMY TUBE PLACEMENT      FACIAL COSMETIC SURGERY           FAMILY HISTORY     History reviewed. No pertinent family history. No family status information on file. SOCIAL HISTORY      reports that he has never smoked.  He has never used smokeless tobacco. He reports that he does not drink alcohol and does not use drugs. REVIEW OFSYSTEMS    (2-9 systems for level 4, 10 or more for level 5)   Review of Systems    Except as noted above the remainder of the review of systems was reviewed and negative. PHYSICAL EXAM    (up to 7 for level 4, 8 or more for level 5)     ED Triage Vitals   BP Temp Temp Source Pulse Resp SpO2 Height Weight   04/16/22 1720 04/16/22 1720 04/16/22 1720 04/16/22 1720 04/16/22 1718 04/16/22 1718 04/16/22 1718 04/16/22 1718   109/76 98.9 °F (37.2 °C) Oral 120 16 97 % 5' 9\" (1.753 m) 180 lb (81.6 kg)      Physical Exam  Constitutional:       Appearance: He is well-developed. HENT:      Head: Normocephalic and atraumatic. Cardiovascular:      Rate and Rhythm: Normal rate and regular rhythm. Pulmonary:      Effort: Pulmonary effort is normal.      Breath sounds: Normal breath sounds. Abdominal:      Palpations: Abdomen is soft. Musculoskeletal:         General: Normal range of motion. Hands:       Cervical back: Normal range of motion and neck supple. Skin:     General: Skin is warm. Neurological:      Mental Status: He is alert and oriented to person, place, and time. Psychiatric:         Behavior: Behavior normal.                 DIAGNOSTIC RESULTS     EKG: All EKG's are interpreted by the Emergency Department Physician who either signs or Co-signs this chart in the absence of a cardiologist.        RADIOLOGY:   Non-plain film images such as CT, Ultrasound and MRI are read by the radiologist. Plain radiographic images arevisualized and preliminarily interpreted by the emergency physician with the below findings:        Interpretation per the Radiologist below, if available at thetime of this note:          ED BEDSIDE ULTRASOUND:   Performed by ED Physician - none    LABS:  Labs Reviewed - No data to display    All other labs were within normal range or not returned as of this dictation.     EMERGENCY DEPARTMENT COURSE and DIFFERENTIAL DIAGNOSIS/MDM:   Vitals:    Vitals:    04/16/22 1718 04/16/22 1720   BP:  109/76   Pulse:  120   Resp: 16    Temp:  98.9 °F (37.2 °C)   TempSrc:  Oral   SpO2: 97%    Weight: 180 lb (81.6 kg)    Height: 5' 9\" (1.753 m)      X-rays negative. Questionable early infection. No visible skin breakdown. No signs of any entry points. Patient will be given Keflex and discharged home with pain control. CONSULTS:  None    PROCEDURES:  Procedures        FINAL IMPRESSION      1. Thumb swelling          DISPOSITION/PLAN   DISPOSITION Decision To Discharge 04/16/2022 07:28:14 PM      PATIENTREFERRED TO:   George Chavez MD  [address] [place] [address]  [phone]    In 3 days        DISCHARGE MEDICATIONS:     New Prescriptions    ACETAMINOPHEN-CODEINE (TYLENOL/CODEINE #3) 300-30 MG PER TABLET    Take 1-2 tablets by mouth 3 times daily as needed for Pain for up to 3 days.     CEPHALEXIN (KEFLEX) 500 MG CAPSULE    Take 1 capsule by mouth 4 times daily for 10 days    NAPROXEN (NAPROSYN) 500 MG TABLET    Take 1 tablet by mouth 2 times daily (with meals)           (Please note that portions of this note were completed with a voice recognition program.  Efforts were made to edit thedictations but occasionally words are mis-transcribed.)    KARIME Gar PA-C  04/16/22 1929

## 2022-04-16 NOTE — LETTER
[facility] ED  [address] [place] [address]  Phone: [phone]             April 18, 2022    Patient: Henrietta Chandler   YOB: 1980   Date of Visit: 4/16/2022       To Whom It May Concern:    Hussain Martinez was seen and treated in our emergency department on 4/16/2022. He may return to work on 4/19/2022.       Sincerely,             Signature:__________________________________

## 2022-04-20 NOTE — ED PROVIDER NOTES
eMERGENCY dEPARTMENT eNCOUnter   [address] [place] Name: Jeni Uriaret  MRN: [id number]  [place]lu[place]urt 1980  Date of evaluation: 4/19/22     Jeni Uriarte is a 39 y.o. male with CC: Foreign Body (left thumb states wood or metal)        This visit was performed by both a physician and an APC. I performed all aspects of the MDM as documented. The care is provided during an unprecedented national emergency due to the novel coronavirus, COVID 19.     Cady Frazier MD  Attending Emergency Physician         Jaylene Denson MD  04/19/22 [id number]

## 2022-04-25 ENCOUNTER — HOSPITAL ENCOUNTER (EMERGENCY)
Age: 42
Discharge: LEFT BEFORE DISCHARGE | End: 2022-04-25

## 2022-04-26 ENCOUNTER — HOSPITAL ENCOUNTER (EMERGENCY)
Age: 42
Discharge: HOME | End: 2022-04-26
Attending: EMERGENCY MEDICINE
Payer: COMMERCIAL

## 2022-04-26 VITALS
BODY MASS INDEX: 25.92 KG/M2 | SYSTOLIC BLOOD PRESSURE: 108 MMHG | OXYGEN SATURATION: 97 % | DIASTOLIC BLOOD PRESSURE: 83 MMHG | WEIGHT: 175 LBS | TEMPERATURE: 98.1 F | HEIGHT: 69 IN | HEART RATE: 95 BPM | RESPIRATION RATE: 16 BRPM

## 2022-04-26 DIAGNOSIS — L03.115 CELLULITIS OF RIGHT LOWER EXTREMITY: Primary | ICD-10-CM

## 2022-04-26 PROCEDURE — 99283 EMERGENCY DEPT VISIT LOW MDM: CPT

## 2022-04-26 RX ORDER — HYDROCODONE BITARTRATE AND ACETAMINOPHEN 5; 325 MG/1; MG/1
1 TABLET ORAL EVERY 4 HOURS PRN
Qty: 10 TABLET | Refills: 0 | Status: SHIPPED | OUTPATIENT
Start: 2022-04-26 | End: 2022-04-29

## 2022-04-26 RX ORDER — DOXYCYCLINE HYCLATE 100 MG
100 TABLET ORAL 2 TIMES DAILY
Qty: 20 TABLET | Refills: 0 | Status: SHIPPED | OUTPATIENT
Start: 2022-04-26 | End: 2022-05-04 | Stop reason: SDUPTHER

## 2022-04-26 ASSESSMENT — ENCOUNTER SYMPTOMS
CHEST TIGHTNESS: 0
ABDOMINAL DISTENTION: 0
CONSTIPATION: 0
SHORTNESS OF BREATH: 0
APNEA: 0
EYES NEGATIVE: 1
COLOR CHANGE: 0
VOMITING: 0
DIARRHEA: 0
SINUS PAIN: 0

## 2022-04-26 ASSESSMENT — PAIN - FUNCTIONAL ASSESSMENT: PAIN_FUNCTIONAL_ASSESSMENT: 0-10

## 2022-04-26 ASSESSMENT — PAIN SCALES - GENERAL: PAINLEVEL_OUTOF10: 9

## 2022-04-26 NOTE — ED PROVIDER NOTES
EMERGENCY DEPARTMENT ENCOUNTER    Pt Name: Andrey Aase  MRN: [id number]  [place] 1980  Date of evaluation: 4/26/22  CHIEF COMPLAINT       Chief Complaint   Patient presents with    Leg Pain     no injury. redness and swelling to right lower leg. started 2 days ago      HISTORY OF PRESENT ILLNESS   27-year-old male presents emergency room for pain and redness to the right lower extremity. Symptoms started 2 days ago. He has had issues with folliculitis and cellulitis in the past.  He reports no fevers or chills. Patient is not immune suppressed. No history of diabetes. REVIEW OF SYSTEMS     Review of Systems   Constitutional: Negative for activity change, chills and diaphoresis. HENT: Negative for congestion, sinus pain and tinnitus. Eyes: Negative. Respiratory: Negative for apnea, chest tightness and shortness of breath. Gastrointestinal: Negative for abdominal distention, constipation, diarrhea and vomiting. Genitourinary: Negative for difficulty urinating and frequency. Musculoskeletal: Negative for arthralgias and myalgias. Skin: Negative for color change and rash. Neurological: Negative for dizziness. Hematological: Negative. Psychiatric/Behavioral: Negative. PASTMEDICAL HISTORY     Past Medical History:   Diagnosis Date    ADHD (attention deficit hyperactivity disorder)     Depression      Past Problem List  Patient Active Problem List   Diagnosis Code    Generalized anxiety disorder F41.1    Moderate episode of recurrent major depressive disorder ([facility] [address] F33.1    Attention deficit hyperactivity disorder (ADHD) F90.9     SURGICAL HISTORY       Past Surgical History:   Procedure Laterality Date    ADENOIDECTOMY AND TYMPANOSTOMY TUBE PLACEMENT      FACIAL COSMETIC SURGERY       CURRENT MEDICATIONS       Previous Medications    AMPHETAMINE-DEXTROAMPHETAMINE (ADDERALL XR) 20 MG EXTENDED RELEASE CAPSULE    Take 1 capsule by mouth every morning for 30 days. AMPHETAMINE-DEXTROAMPHETAMINE (ADDERALL XR) 25 MG EXTENDED RELEASE CAPSULE    Take 1 capsule by mouth every morning for 30 days. AMPHETAMINE-DEXTROAMPHETAMINE (ADDERALL XR) 30 MG EXTENDED RELEASE CAPSULE    Take 1 capsule by mouth daily for 30 days. CEPHALEXIN (KEFLEX) 500 MG CAPSULE    Take 1 capsule by mouth 4 times daily for 10 days    CLONIDINE (CATAPRES) 0.2 MG TABLET    Take 1 tablet by mouth 2 times daily    ESCITALOPRAM (LEXAPRO) 20 MG TABLET    Take 1 tablet by mouth daily    NAPROXEN (NAPROSYN) 500 MG TABLET    Take 1 tablet by mouth 2 times daily (with meals)     ALLERGIES     is allergic to codeine. FAMILY HISTORY     has no family status information on file. SOCIAL HISTORY       Social History     Tobacco Use    Smoking status: Never Smoker    Smokeless tobacco: Never Used   Substance Use Topics    Alcohol use: No    Drug use: No     PHYSICAL EXAM     INITIAL VITALS: /83   Pulse 95   Temp 98.1 °F (36.7 °C) (Oral)   Resp 16   Ht 5' 9\" (1.753 m)   Wt 175 lb (79.4 kg)   SpO2 97%   BMI 25.84 kg/m²    Physical Exam  Constitutional:       General: He is not in acute distress. Appearance: He is well-developed. HENT:      Head: Normocephalic. Eyes:      Pupils: Pupils are equal, round, and reactive to light. Cardiovascular:      Rate and Rhythm: Normal rate and regular rhythm. Heart sounds: Normal heart sounds. Pulmonary:      Effort: Pulmonary effort is normal. No respiratory distress. Breath sounds: Normal breath sounds. Abdominal:      General: Bowel sounds are normal.      Palpations: Abdomen is soft. Tenderness: There is no abdominal tenderness. Musculoskeletal:         General: Normal range of motion. Skin:     General: Skin is warm and dry. Comments: Area of folliculitis and mild soft tissue cellulitis to the lower right leg   Neurological:      Mental Status: He is alert and oriented to person, place, and time.          MEDICAL DECISION MAKING:     Alert oriented nondistressed 44-year-old male presenting to the emergency room with swelling and discomfort to the anterior right lower extremity. Patient has mild cellulitis and folliculitis to this area. He is overall healthy with no significant underlying medical problems that would inhibit healing. Patient started on oral doxycycline for home. Patient encouraged to return for worsening pain swelling or redness. CRITICAL CARE:       PROCEDURES:    Procedures    DIAGNOSTIC RESULTS   EKG:All EKG's are interpreted by the Emergency Department Physician who either signs or Co-signs this chart in the absence of a cardiologist.        RADIOLOGY:All plain film, CT, MRI, and formal ultrasound images (except ED bedside ultrasound) are read by the radiologist, see reports below, unless otherwisenoted in MDM or here. No orders to display     LABS: All lab results were reviewed by myself, and all abnormals are listed below. Labs Reviewed - No data to display    EMERGENCY DEPARTMENTCOURSE:         Vitals:    Vitals:    04/26/22 0915   BP: 108/83   Pulse: 95   Resp: 16   Temp: 98.1 °F (36.7 °C)   TempSrc: Oral   SpO2: 97%   Weight: 175 lb (79.4 kg)   Height: 5' 9\" (1.753 m)       The patient was given the following medications while in the emergency department:  Orders Placed This Encounter   Medications    doxycycline hyclate (VIBRA-TABS) 100 MG tablet     Sig: Take 1 tablet by mouth 2 times daily for 10 days     Dispense:  20 tablet     Refill:  0    HYDROcodone-acetaminophen (NORCO) 5-325 MG per tablet     Sig: Take 1 tablet by mouth every 4 hours as needed for Pain for up to 3 days. Intended supply: 3 days. Take lowest dose possible to manage pain     Dispense:  10 tablet     Refill:  0     CONSULTS:  None    FINAL IMPRESSION      1.  Cellulitis of right lower extremity          DISPOSITION/PLAN   DISPOSITION Decision To Discharge 04/26/2022 10:05:33 AM      PATIENT REFERRED TO:  Amberly Mirza

## 2022-04-26 NOTE — Clinical Note
Beatrice Dinh was seen and treated in our emergency department on 4/26/2022. He may return to work on 04/29/2022. If you have any questions or concerns, please don't hesitate to call.       Marjorie Crooks MD

## 2022-04-26 NOTE — ED NOTES
Pt to er with c/o right lower leg pain. Pt states he has had pain, swelling, and redness to lower leg for 2 days. Pt denies known injury. Pt a&ox3. Skin warm and dry. Respirations even and non-labored.       Sonia Mcfarland RN  04/26/22 [id number]

## 2022-04-26 NOTE — Clinical Note
Betito Yee was seen and treated in our emergency department on 4/26/2022. He may return to work on 04/29/2022. If you have any questions or concerns, please don't hesitate to call.       Liz Estrada MD

## 2022-05-04 ENCOUNTER — TELEPHONE (OUTPATIENT)
Dept: FAMILY MEDICINE CLINIC | Age: 42
End: 2022-05-04

## 2022-05-04 ENCOUNTER — OFFICE VISIT (OUTPATIENT)
Dept: FAMILY MEDICINE CLINIC | Age: 42
End: 2022-05-04
Payer: COMMERCIAL

## 2022-05-04 VITALS
HEIGHT: 69 IN | HEART RATE: 85 BPM | TEMPERATURE: 97.9 F | BODY MASS INDEX: 25.92 KG/M2 | WEIGHT: 175 LBS | SYSTOLIC BLOOD PRESSURE: 118 MMHG | OXYGEN SATURATION: 98 % | DIASTOLIC BLOOD PRESSURE: 72 MMHG

## 2022-05-04 DIAGNOSIS — L02.419 CELLULITIS AND ABSCESS OF LOWER EXTREMITY: Primary | ICD-10-CM

## 2022-05-04 DIAGNOSIS — F41.1 GENERALIZED ANXIETY DISORDER: ICD-10-CM

## 2022-05-04 DIAGNOSIS — F33.1 MODERATE EPISODE OF RECURRENT MAJOR DEPRESSIVE DISORDER (HCC): ICD-10-CM

## 2022-05-04 DIAGNOSIS — L03.119 CELLULITIS AND ABSCESS OF LOWER EXTREMITY: Primary | ICD-10-CM

## 2022-05-04 DIAGNOSIS — F90.9 ATTENTION DEFICIT HYPERACTIVITY DISORDER (ADHD), UNSPECIFIED ADHD TYPE: ICD-10-CM

## 2022-05-04 PROCEDURE — 99214 OFFICE O/P EST MOD 30 MIN: CPT | Performed by: STUDENT IN AN ORGANIZED HEALTH CARE EDUCATION/TRAINING PROGRAM

## 2022-05-04 RX ORDER — ESCITALOPRAM OXALATE 20 MG/1
20 TABLET ORAL DAILY
Qty: 30 TABLET | Refills: 5 | Status: SHIPPED | OUTPATIENT
Start: 2022-05-04 | End: 2022-06-08 | Stop reason: SDUPTHER

## 2022-05-04 RX ORDER — DEXTROAMPHETAMINE SACCHARATE, AMPHETAMINE ASPARTATE MONOHYDRATE, DEXTROAMPHETAMINE SULFATE AND AMPHETAMINE SULFATE 7.5; 7.5; 7.5; 7.5 MG/1; MG/1; MG/1; MG/1
30 CAPSULE, EXTENDED RELEASE ORAL DAILY
Qty: 30 CAPSULE | Refills: 0 | Status: SHIPPED | OUTPATIENT
Start: 2022-05-04 | End: 2022-06-08 | Stop reason: SDUPTHER

## 2022-05-04 RX ORDER — CLONIDINE HYDROCHLORIDE 0.2 MG/1
0.2 TABLET ORAL 2 TIMES DAILY
Qty: 60 TABLET | Refills: 5 | Status: SHIPPED | OUTPATIENT
Start: 2022-05-04 | End: 2022-06-08 | Stop reason: SDUPTHER

## 2022-05-04 RX ORDER — DOXYCYCLINE HYCLATE 100 MG
100 TABLET ORAL 2 TIMES DAILY
Qty: 20 TABLET | Refills: 0 | Status: SHIPPED | OUTPATIENT
Start: 2022-05-04 | End: 2022-05-14

## 2022-05-04 ASSESSMENT — PATIENT HEALTH QUESTIONNAIRE - PHQ9
7. TROUBLE CONCENTRATING ON THINGS, SUCH AS READING THE NEWSPAPER OR WATCHING TELEVISION: 0
9. THOUGHTS THAT YOU WOULD BE BETTER OFF DEAD, OR OF HURTING YOURSELF: 0
10. IF YOU CHECKED OFF ANY PROBLEMS, HOW DIFFICULT HAVE THESE PROBLEMS MADE IT FOR YOU TO DO YOUR WORK, TAKE CARE OF THINGS AT HOME, OR GET ALONG WITH OTHER PEOPLE: 0
SUM OF ALL RESPONSES TO PHQ9 QUESTIONS 1 & 2: 0
6. FEELING BAD ABOUT YOURSELF - OR THAT YOU ARE A FAILURE OR HAVE LET YOURSELF OR YOUR FAMILY DOWN: 0
SUM OF ALL RESPONSES TO PHQ QUESTIONS 1-9: 0
3. TROUBLE FALLING OR STAYING ASLEEP: 0
1. LITTLE INTEREST OR PLEASURE IN DOING THINGS: 0
4. FEELING TIRED OR HAVING LITTLE ENERGY: 0
2. FEELING DOWN, DEPRESSED OR HOPELESS: 0
SUM OF ALL RESPONSES TO PHQ QUESTIONS 1-9: 0
8. MOVING OR SPEAKING SO SLOWLY THAT OTHER PEOPLE COULD HAVE NOTICED. OR THE OPPOSITE, BEING SO FIGETY OR RESTLESS THAT YOU HAVE BEEN MOVING AROUND A LOT MORE THAN USUAL: 0
5. POOR APPETITE OR OVEREATING: 0

## 2022-05-04 NOTE — TELEPHONE ENCOUNTER
----- Message from [employer] sent at 5/4/2022  3:20 PM EDT -----  Subject: Referral Request    QUESTIONS   Reason for referral request? Pt is calling to see if his pcp could give   him a call back. Pt was prescribed a antibiotics from his provider. Pt   said the medication was expressive even using RX discount. Pt is seeing if   his pcp could send something else in. Please call pt back. Has the physician seen you for this condition before? No   Preferred Specialist (if applicable)? Do you already have an appointment scheduled? No  Additional Information for Provider?   ---------------------------------------------------------------------------  --------------  CALL BACK INFO  What is the best way for the office to contact you? OK to leave message on   voicemail  Preferred Call Back Phone Number? [phone]  ---------------------------------------------------------------------------  --------------  SCRIPT ANSWERS  Relationship to Patient?  Self

## 2022-05-06 NOTE — PROGRESS NOTES
Subjective:  Paola Pineda presents for   Chief Complaint   Patient presents with   [address] from [address] [facility] 4/26/22 right leg infection, pt states he is doing better concerned that it is not healing        HPI   Here for follow-up on right leg infection. He received 1 round of doxycycline antibiotics. He states he is doing better but still has some swelling and pain with some erythema. Needs refills on his ADHD medication and depression medication. Patient Active Problem List   Diagnosis    Generalized anxiety disorder    Moderate episode of recurrent major depressive disorder (HCC)    Attention deficit hyperactivity disorder (ADHD)         Review of Systems   All other systems reviewed and are negative. Objective:  Physical Exam   Vitals:   Vitals:    05/04/22 1441   BP: 118/72   Site: Left Upper Arm   Position: Sitting   Cuff Size: Large Adult   Pulse: 85   Temp: 97.9 °F (36.6 °C)   TempSrc: Temporal   SpO2: 98%   Weight: 175 lb (79.4 kg)   Height: 5' 9\" (1.753 m)     Wt Readings from Last 3 Encounters:   05/04/22 175 lb (79.4 kg)   04/26/22 175 lb (79.4 kg)   04/16/22 180 lb (81.6 kg)     Ht Readings from Last 3 Encounters:   05/04/22 5' 9\" (1.753 m)   04/26/22 5' 9\" (1.753 m)   04/16/22 5' 9\" (1.753 m)     Body mass index is 25.84 kg/m². Physical Exam  Vitals reviewed. Constitutional:       General: He is not in acute distress. Appearance: Normal appearance. HENT:      Mouth/Throat:      Mouth: Mucous membranes are moist.   Eyes:      Conjunctiva/sclera: Conjunctivae normal.   Cardiovascular:      Rate and Rhythm: Normal rate and regular rhythm. Heart sounds: Normal heart sounds. Pulmonary:      Effort: Pulmonary effort is normal.      Breath sounds: Normal breath sounds. Skin:     General: Skin is warm and dry. Findings: Erythema and rash present. Neurological:      Mental Status: He is alert and oriented to person, place, and time.    Psychiatric:         Mood and Affect: Mood normal.            Assessment/Plan:    Diagnosis Orders   1. Cellulitis and abscess of lower extremity  doxycycline hyclate (VIBRA-TABS) 100 MG tablet   2. Attention deficit hyperactivity disorder (ADHD), unspecified ADHD type  amphetamine-dextroamphetamine (ADDERALL XR) 30 MG extended release capsule   3. Generalized anxiety disorder  cloNIDine (CATAPRES) 0.2 MG tablet    escitalopram (LEXAPRO) 20 MG tablet   4. Moderate episode of recurrent major depressive disorder (HCC)  escitalopram (LEXAPRO) 20 MG tablet        Return in 5 weeks (on 6/8/2022). Cellulitis and abscess of lower extremity. Not completely gone away. We will send in doxycycline. Uses clonidine for his generalized anxiety disorder and Lexapro. We will refill this for his depression also. ADHD-currently doing well on Adderall 30 mg XR. We will refill this. Controlled Substance Monitoring:    Acute and Chronic Pain Monitoring:   RX Monitoring 5/6/2022   Attestation -   Periodic Controlled Substance Monitoring No signs of potential drug abuse or diversion identified.

## 2022-05-25 ENCOUNTER — HOSPITAL ENCOUNTER (EMERGENCY)
Age: 42
Discharge: HOME | End: 2022-05-25
Attending: EMERGENCY MEDICINE
Payer: COMMERCIAL

## 2022-05-25 VITALS
RESPIRATION RATE: 20 BRPM | DIASTOLIC BLOOD PRESSURE: 82 MMHG | WEIGHT: 174.7 LBS | HEART RATE: 90 BPM | SYSTOLIC BLOOD PRESSURE: 130 MMHG | BODY MASS INDEX: 25.8 KG/M2 | TEMPERATURE: 97.5 F | OXYGEN SATURATION: 98 %

## 2022-05-25 DIAGNOSIS — R21 RASH: Primary | ICD-10-CM

## 2022-05-25 PROCEDURE — 6360000002 HC RX W HCPCS: Performed by: NURSE PRACTITIONER

## 2022-05-25 PROCEDURE — 96372 THER/PROPH/DIAG INJ SC/IM: CPT

## 2022-05-25 PROCEDURE — 99284 EMERGENCY DEPT VISIT MOD MDM: CPT

## 2022-05-25 RX ORDER — PERMETHRIN 50 MG/G
CREAM TOPICAL
Qty: 60 G | Refills: 0 | Status: ON HOLD | OUTPATIENT
Start: 2022-05-25 | End: 2022-06-04 | Stop reason: HOSPADM

## 2022-05-25 RX ORDER — PREDNISONE 20 MG/1
40 TABLET ORAL DAILY
Qty: 10 TABLET | Refills: 0 | Status: SHIPPED | OUTPATIENT
Start: 2022-05-25 | End: 2022-05-30

## 2022-05-25 RX ORDER — DEXAMETHASONE SODIUM PHOSPHATE 10 MG/ML
8 INJECTION, SOLUTION INTRAMUSCULAR; INTRAVENOUS ONCE
Status: COMPLETED | OUTPATIENT
Start: 2022-05-25 | End: 2022-05-25

## 2022-05-25 RX ORDER — DIPHENHYDRAMINE HCL 25 MG
25 CAPSULE ORAL EVERY 6 HOURS PRN
Qty: 20 CAPSULE | Refills: 0 | Status: ON HOLD | OUTPATIENT
Start: 2022-05-25 | End: 2022-09-07 | Stop reason: HOSPADM

## 2022-05-25 RX ADMIN — DEXAMETHASONE SODIUM PHOSPHATE 8 MG: 10 INJECTION, SOLUTION INTRAMUSCULAR; INTRAVENOUS at 20:59

## 2022-05-25 ASSESSMENT — ENCOUNTER SYMPTOMS
SHORTNESS OF BREATH: 0
COLOR CHANGE: 0
FACIAL SWELLING: 0

## 2022-05-25 ASSESSMENT — PAIN - FUNCTIONAL ASSESSMENT: PAIN_FUNCTIONAL_ASSESSMENT: 0-10

## 2022-05-25 ASSESSMENT — PAIN SCALES - GENERAL: PAINLEVEL_OUTOF10: 6

## 2022-05-26 NOTE — ED NOTES
Pt to ED via private auto. Pt c/o rash onset Saturday. Pt reports rash on his R arm, lower back, and buttocks. Pt reports rash as itchy, red, and painful. Pt states he was playing karate with some friends and was on a mat in their garage. Pt A&Ox4, ambulatory to room.      [facility]  05/25/22 2034

## 2022-05-26 NOTE — ED PROVIDER NOTES
[address] ED  eMERGENCY dEPARTMENT eNCOUnter      Pt Name: Angel Simons  MRN: [id number]  [place] 1980  Date of evaluation: 5/25/2022  Provider: JANESSA Jaeger [id number]       Chief Complaint   Patient presents with    Rash     onset sat, was doing martial arts; rolling on mats. rash on arm & gluteal region         HISTORY OF PRESENT ILLNESS  (Location/Symptom, Timing/Onset, Context/Setting, Quality, Duration, Modifying Factors, Severity.)   Angel Simons is a 39 y.o. male who presents to the emergency department via private auto for an itchy, painful rash to his legs, buttocks. Onset was 5 days ago. States prior to the onset was at a friend's house practicing martial arts on mats in their garage. Denies fever, chills, SOB. Denies the use of new medications, soaps, detergents, or other products. The itching is worse at night. Rates his pain 6/10 at this time. Nursing Notes were reviewed. ALLERGIES     Codeine    CURRENT MEDICATIONS       Discharge Medication List as of 5/25/2022  9:07 PM      CONTINUE these medications which have NOT CHANGED    Details   amphetamine-dextroamphetamine (ADDERALL XR) 30 MG extended release capsule Take 1 capsule by mouth daily for 30 days. , Disp-30 capsule, R-0Normal      cloNIDine (CATAPRES) 0.2 MG tablet Take 1 tablet by mouth 2 times daily, Disp-60 tablet, R-5Normal      escitalopram (LEXAPRO) 20 MG tablet Take 1 tablet by mouth daily, Disp-30 tablet, R-5Normal      naproxen (NAPROSYN) 500 MG tablet Take 1 tablet by mouth 2 times daily (with meals), Disp-20 tablet, R-0Print      amphetamine-dextroamphetamine (ADDERALL XR) 25 MG extended release capsule Take 1 capsule by mouth every morning for 30 days. , Disp-30 capsule, R-0Normal      amphetamine-dextroamphetamine (ADDERALL XR) 20 MG extended release capsule Take 1 capsule by mouth every morning for 30 days. , Disp-30 capsule, R-0Normal             PAST MEDICAL HISTORY Diagnosis Date    ADHD (attention deficit hyperactivity disorder)     Depression        SURGICAL HISTORY           Procedure Laterality Date    ADENOIDECTOMY AND TYMPANOSTOMY TUBE PLACEMENT      FACIAL COSMETIC SURGERY           FAMILY HISTORY     No family history on file. No family status information on file. SOCIAL HISTORY      reports that he has never smoked. He has never used smokeless tobacco. He reports that he does not drink alcohol and does not use drugs. REVIEW OF SYSTEMS    (2-9 systems for level 4, 10 or more for level 5)     Review of Systems   Constitutional: Negative for chills, diaphoresis, fatigue and fever. HENT: Negative for facial swelling. Respiratory: Negative for shortness of breath. Musculoskeletal: Negative for arthralgias and myalgias. Skin: Positive for rash. Negative for color change and wound. Neurological: Negative for weakness. Except as noted above the remainder of the review of systems was reviewed and negative. PHYSICAL EXAM    (up to 7 for level 4, 8 or more for level 5)     ED Triage Vitals [05/25/22 2020]   BP Temp Temp src Heart Rate Resp SpO2 Height Weight   130/82 97.5 °F (36.4 °C) -- 90 20 98 % -- 174 lb 11.2 oz (79.2 kg)     Physical Exam  Vitals reviewed. Constitutional:       General: He is not in acute distress. Appearance: He is well-developed. He is not diaphoretic. Eyes:      General: No scleral icterus. Conjunctiva/sclera: Conjunctivae normal.   Cardiovascular:      Rate and Rhythm: Normal rate. Pulmonary:      Effort: Pulmonary effort is normal. No respiratory distress. Breath sounds: No stridor. Skin:     General: Skin is warm and dry. Findings: Rash (Erythematous, concentrated on buttocks. Areas also noted to BLE. Linear at times. No drainage. ) present. Rash is macular and papular. Rash is not crusting, pustular, urticarial or vesicular.    Neurological:      Mental Status: He is alert and oriented to person, place, and time. Psychiatric:         Behavior: Behavior normal.         EMERGENCY DEPARTMENT COURSE and DIFFERENTIAL DIAGNOSIS/MDM:   Vitals:    Vitals:    05/25/22 2020   BP: 130/82   Pulse: 90   Resp: 20   Temp: 97.5 °F (36.4 °C)   SpO2: 98%   Weight: 174 lb 11.2 oz (79.2 kg)         MEDICATIONS GIVEN IN THE ED:  Medications   dexamethasone (PF) (DECADRON) injection 8 mg (8 mg IntraMUSCular Given 5/25/22 2059)       CLINICAL DECISION MAKING:  The patient presented alert with a nontoxic appearance and was seen in conjunction with Dr. Meghana Galeas. Prescriptions were written for prednisone and Elimite. Follow up with pcp for a recheck. Evaluation and treatment course in the ED, and plan of care upon discharge was discussed in length with the patient. Patient had no further questions prior to being discharged and was instructed to return to the ED for new or worsening symptoms. Care was provided during an unprecedented national emergency due to the novel coronavirus, Covid-19. FINAL IMPRESSION      1.  Rash            Problem List  Patient Active Problem List   Diagnosis Code    Generalized anxiety disorder F41.1    Moderate episode of recurrent major depressive disorder ([facility] [address] F33.1    Attention deficit hyperactivity disorder (ADHD) F90.9         DISPOSITION/PLAN   DISPOSITION Decision To Discharge 05/25/2022 08:50:11 PM      PATIENT REFERRED TO:   Uri Jensen MD  [address] [place] [address]    Schedule an appointment as soon as possible for a visit       [facility] ED  [address] [facility]  [phone]    If symptoms worsen, As needed      DISCHARGE MEDICATIONS:     Discharge Medication List as of 5/25/2022  9:07 PM      START taking these medications    Details   permethrin (ELIMITE) 5 % cream Apply topically as directed, Disp-60 g, R-0, Print      predniSONE (DELTASONE) 20 MG tablet Take 2 tablets by mouth daily for 5 days, Disp-10 tablet, R-0Print      diphenhydrAMINE (BENADRYL) 25 MG capsule Take 1 capsule by mouth every 6 hours as needed for Itching, Disp-20 capsule, R-0Print                 (Please note that portions of this note were completed with a voice recognition program.  Efforts were made to edit the dictations but occasionally words are mis-transcribed.)    JANESSA De La Vega - JANESSA Stewart CNP  05/25/22 9124

## 2022-06-01 ENCOUNTER — HOSPITAL ENCOUNTER (INPATIENT)
Age: 42
LOS: 3 days | Discharge: HOME | DRG: 580 | End: 2022-06-04
Attending: EMERGENCY MEDICINE | Admitting: FAMILY MEDICINE
Payer: COMMERCIAL

## 2022-06-01 DIAGNOSIS — L03.119 CELLULITIS AND ABSCESS OF UPPER EXTREMITY: ICD-10-CM

## 2022-06-01 DIAGNOSIS — L02.419 CELLULITIS AND ABSCESS OF UPPER EXTREMITY: ICD-10-CM

## 2022-06-01 DIAGNOSIS — L08.9 LOCAL INFECTION OF SKIN AND SUBCUTANEOUS TISSUE: Primary | ICD-10-CM

## 2022-06-01 DIAGNOSIS — L02.413 CUTANEOUS ABSCESS OF RIGHT UPPER EXTREMITY: ICD-10-CM

## 2022-06-01 PROBLEM — L03.113 CELLULITIS OF RIGHT FOREARM: Status: ACTIVE | Noted: 2022-06-01

## 2022-06-01 LAB
ABSOLUTE EOS #: 0.26 K/UL (ref 0–0.44)
ABSOLUTE IMMATURE GRANULOCYTE: 0.09 K/UL (ref 0–0.3)
ABSOLUTE LYMPH #: 2.2 K/UL (ref 1.1–3.7)
ABSOLUTE MONO #: 1.29 K/UL (ref 0.1–1.2)
ANION GAP SERPL CALCULATED.3IONS-SCNC: 11 MMOL/L (ref 9–17)
BASOPHILS # BLD: 1 % (ref 0–2)
BASOPHILS ABSOLUTE: 0.07 K/UL (ref 0–0.2)
BUN BLDV-MCNC: 18 MG/DL (ref 6–20)
BUN/CREAT BLD: 16 (ref 9–20)
C-REACTIVE PROTEIN: 6.1 MG/L (ref 0–5)
CALCIUM SERPL-MCNC: 8.5 MG/DL (ref 8.6–10.4)
CHLORIDE BLD-SCNC: 100 MMOL/L (ref 98–107)
CO2: 24 MMOL/L (ref 20–31)
CREAT SERPL-MCNC: 1.14 MG/DL (ref 0.7–1.2)
EOSINOPHILS RELATIVE PERCENT: 2 % (ref 1–4)
GFR AFRICAN AMERICAN: >60 ML/MIN
GFR NON-AFRICAN AMERICAN: >60 ML/MIN
GFR SERPL CREATININE-BSD FRML MDRD: ABNORMAL ML/MIN/{1.73_M2}
GLUCOSE BLD-MCNC: 140 MG/DL (ref 70–99)
HCT VFR BLD CALC: 41.8 % (ref 40.7–50.3)
HEMOGLOBIN: 13.9 G/DL (ref 13–17)
IMMATURE GRANULOCYTES: 1 %
LACTIC ACID: 2.1 MMOL/L (ref 0.5–2.2)
LYMPHOCYTES # BLD: 17 % (ref 24–43)
MCH RBC QN AUTO: 30.2 PG (ref 25.2–33.5)
MCHC RBC AUTO-ENTMCNC: 33.3 G/DL (ref 28.4–34.8)
MCV RBC AUTO: 90.7 FL (ref 82.6–102.9)
MONOCYTES # BLD: 10 % (ref 3–12)
NRBC AUTOMATED: 0 PER 100 WBC
PDW BLD-RTO: 13.1 % (ref 11.8–14.4)
PLATELET # BLD: 295 K/UL (ref 138–453)
PMV BLD AUTO: 9.8 FL (ref 8.1–13.5)
POTASSIUM SERPL-SCNC: 4.4 MMOL/L (ref 3.7–5.3)
RBC # BLD: 4.61 M/UL (ref 4.21–5.77)
SEDIMENTATION RATE, ERYTHROCYTE: 22 MM/HR (ref 0–15)
SEG NEUTROPHILS: 69 % (ref 36–65)
SEGMENTED NEUTROPHILS ABSOLUTE COUNT: 9 K/UL (ref 1.5–8.1)
SODIUM BLD-SCNC: 135 MMOL/L (ref 135–144)
WBC # BLD: 12.9 K/UL (ref 3.5–11.3)

## 2022-06-01 PROCEDURE — 80048 BASIC METABOLIC PNL TOTAL CA: CPT

## 2022-06-01 PROCEDURE — 99222 1ST HOSP IP/OBS MODERATE 55: CPT | Performed by: NURSE PRACTITIONER

## 2022-06-01 PROCEDURE — 86140 C-REACTIVE PROTEIN: CPT

## 2022-06-01 PROCEDURE — 87070 CULTURE OTHR SPECIMN AEROBIC: CPT

## 2022-06-01 PROCEDURE — 99285 EMERGENCY DEPT VISIT HI MDM: CPT

## 2022-06-01 PROCEDURE — 2580000003 HC RX 258: Performed by: PHYSICIAN ASSISTANT

## 2022-06-01 PROCEDURE — 1200000000 HC SEMI PRIVATE

## 2022-06-01 PROCEDURE — 87075 CULTR BACTERIA EXCEPT BLOOD: CPT

## 2022-06-01 PROCEDURE — 87186 SC STD MICRODIL/AGAR DIL: CPT

## 2022-06-01 PROCEDURE — 96365 THER/PROPH/DIAG IV INF INIT: CPT

## 2022-06-01 PROCEDURE — 87205 SMEAR GRAM STAIN: CPT

## 2022-06-01 PROCEDURE — 87154 CUL TYP ID BLD PTHGN 6+ TRGT: CPT

## 2022-06-01 PROCEDURE — 6370000000 HC RX 637 (ALT 250 FOR IP): Performed by: NURSE PRACTITIONER

## 2022-06-01 PROCEDURE — 86403 PARTICLE AGGLUT ANTBDY SCRN: CPT

## 2022-06-01 PROCEDURE — 6370000000 HC RX 637 (ALT 250 FOR IP): Performed by: PHYSICIAN ASSISTANT

## 2022-06-01 PROCEDURE — 87040 BLOOD CULTURE FOR BACTERIA: CPT

## 2022-06-01 PROCEDURE — 96375 TX/PRO/DX INJ NEW DRUG ADDON: CPT

## 2022-06-01 PROCEDURE — 85652 RBC SED RATE AUTOMATED: CPT

## 2022-06-01 PROCEDURE — 83605 ASSAY OF LACTIC ACID: CPT

## 2022-06-01 PROCEDURE — 85025 COMPLETE CBC W/AUTO DIFF WBC: CPT

## 2022-06-01 PROCEDURE — 2580000003 HC RX 258: Performed by: NURSE PRACTITIONER

## 2022-06-01 PROCEDURE — 6360000002 HC RX W HCPCS: Performed by: PHYSICIAN ASSISTANT

## 2022-06-01 RX ORDER — DIPHENHYDRAMINE HCL 25 MG
25 TABLET ORAL EVERY 6 HOURS PRN
Status: DISCONTINUED | OUTPATIENT
Start: 2022-06-01 | End: 2022-06-04 | Stop reason: HOSPADM

## 2022-06-01 RX ORDER — ACETAMINOPHEN 325 MG/1
650 TABLET ORAL EVERY 6 HOURS PRN
Status: DISCONTINUED | OUTPATIENT
Start: 2022-06-01 | End: 2022-06-04 | Stop reason: HOSPADM

## 2022-06-01 RX ORDER — HYDROCODONE BITARTRATE AND ACETAMINOPHEN 5; 325 MG/1; MG/1
1 TABLET ORAL ONCE
Status: COMPLETED | OUTPATIENT
Start: 2022-06-01 | End: 2022-06-01

## 2022-06-01 RX ORDER — SODIUM CHLORIDE 0.9 % (FLUSH) 0.9 %
10 SYRINGE (ML) INJECTION PRN
Status: DISCONTINUED | OUTPATIENT
Start: 2022-06-01 | End: 2022-06-04 | Stop reason: HOSPADM

## 2022-06-01 RX ORDER — HYDROCODONE BITARTRATE AND ACETAMINOPHEN 5; 325 MG/1; MG/1
1 TABLET ORAL EVERY 4 HOURS PRN
Status: COMPLETED | OUTPATIENT
Start: 2022-06-01 | End: 2022-06-02

## 2022-06-01 RX ORDER — POTASSIUM CHLORIDE 7.45 MG/ML
10 INJECTION INTRAVENOUS PRN
Status: DISCONTINUED | OUTPATIENT
Start: 2022-06-01 | End: 2022-06-04 | Stop reason: HOSPADM

## 2022-06-01 RX ORDER — SODIUM CHLORIDE 0.9 % (FLUSH) 0.9 %
5-40 SYRINGE (ML) INJECTION EVERY 12 HOURS SCHEDULED
Status: DISCONTINUED | OUTPATIENT
Start: 2022-06-01 | End: 2022-06-04 | Stop reason: HOSPADM

## 2022-06-01 RX ORDER — POLYETHYLENE GLYCOL 3350 17 G/17G
17 POWDER, FOR SOLUTION ORAL DAILY PRN
Status: DISCONTINUED | OUTPATIENT
Start: 2022-06-01 | End: 2022-06-04 | Stop reason: HOSPADM

## 2022-06-01 RX ORDER — 0.9 % SODIUM CHLORIDE 0.9 %
500 INTRAVENOUS SOLUTION INTRAVENOUS ONCE
Status: COMPLETED | OUTPATIENT
Start: 2022-06-01 | End: 2022-06-01

## 2022-06-01 RX ORDER — CLONIDINE HYDROCHLORIDE 0.2 MG/1
0.2 TABLET ORAL 2 TIMES DAILY
Status: DISCONTINUED | OUTPATIENT
Start: 2022-06-01 | End: 2022-06-04 | Stop reason: HOSPADM

## 2022-06-01 RX ORDER — SODIUM CHLORIDE 9 MG/ML
INJECTION, SOLUTION INTRAVENOUS CONTINUOUS
Status: DISCONTINUED | OUTPATIENT
Start: 2022-06-01 | End: 2022-06-04 | Stop reason: HOSPADM

## 2022-06-01 RX ORDER — ENOXAPARIN SODIUM 100 MG/ML
40 INJECTION SUBCUTANEOUS DAILY
Status: DISCONTINUED | OUTPATIENT
Start: 2022-06-02 | End: 2022-06-04 | Stop reason: HOSPADM

## 2022-06-01 RX ORDER — ESCITALOPRAM OXALATE 10 MG/1
20 TABLET ORAL DAILY
Status: DISCONTINUED | OUTPATIENT
Start: 2022-06-01 | End: 2022-06-04 | Stop reason: HOSPADM

## 2022-06-01 RX ORDER — POTASSIUM CHLORIDE 20 MEQ/1
40 TABLET, EXTENDED RELEASE ORAL PRN
Status: DISCONTINUED | OUTPATIENT
Start: 2022-06-01 | End: 2022-06-04 | Stop reason: HOSPADM

## 2022-06-01 RX ORDER — DEXTROAMPHETAMINE SACCHARATE, AMPHETAMINE ASPARTATE MONOHYDRATE, DEXTROAMPHETAMINE SULFATE AND AMPHETAMINE SULFATE 7.5; 7.5; 7.5; 7.5 MG/1; MG/1; MG/1; MG/1
30 CAPSULE, EXTENDED RELEASE ORAL DAILY
Status: DISCONTINUED | OUTPATIENT
Start: 2022-06-02 | End: 2022-06-04 | Stop reason: HOSPADM

## 2022-06-01 RX ORDER — ONDANSETRON 4 MG/1
4 TABLET, ORALLY DISINTEGRATING ORAL EVERY 8 HOURS PRN
Status: DISCONTINUED | OUTPATIENT
Start: 2022-06-01 | End: 2022-06-04 | Stop reason: HOSPADM

## 2022-06-01 RX ORDER — ONDANSETRON 2 MG/ML
4 INJECTION INTRAMUSCULAR; INTRAVENOUS EVERY 6 HOURS PRN
Status: DISCONTINUED | OUTPATIENT
Start: 2022-06-01 | End: 2022-06-04 | Stop reason: HOSPADM

## 2022-06-01 RX ORDER — ONDANSETRON 2 MG/ML
4 INJECTION INTRAMUSCULAR; INTRAVENOUS ONCE
Status: COMPLETED | OUTPATIENT
Start: 2022-06-01 | End: 2022-06-01

## 2022-06-01 RX ORDER — MAGNESIUM SULFATE 1 G/100ML
1000 INJECTION INTRAVENOUS PRN
Status: DISCONTINUED | OUTPATIENT
Start: 2022-06-01 | End: 2022-06-04 | Stop reason: HOSPADM

## 2022-06-01 RX ORDER — SODIUM CHLORIDE 9 MG/ML
INJECTION, SOLUTION INTRAVENOUS PRN
Status: DISCONTINUED | OUTPATIENT
Start: 2022-06-01 | End: 2022-06-04 | Stop reason: HOSPADM

## 2022-06-01 RX ORDER — HYDROCODONE BITARTRATE AND ACETAMINOPHEN 5; 325 MG/1; MG/1
2 TABLET ORAL EVERY 4 HOURS PRN
Status: COMPLETED | OUTPATIENT
Start: 2022-06-01 | End: 2022-06-02

## 2022-06-01 RX ORDER — ACETAMINOPHEN 650 MG/1
650 SUPPOSITORY RECTAL EVERY 6 HOURS PRN
Status: DISCONTINUED | OUTPATIENT
Start: 2022-06-01 | End: 2022-06-04 | Stop reason: HOSPADM

## 2022-06-01 RX ADMIN — CLONIDINE HYDROCHLORIDE 0.2 MG: 0.2 TABLET ORAL at 22:37

## 2022-06-01 RX ADMIN — VANCOMYCIN HYDROCHLORIDE 2000 MG: 1 INJECTION, POWDER, LYOPHILIZED, FOR SOLUTION INTRAVENOUS at 18:41

## 2022-06-01 RX ADMIN — HYDROCODONE BITARTRATE AND ACETAMINOPHEN 1 TABLET: 5; 325 TABLET ORAL at 18:01

## 2022-06-01 RX ADMIN — HYDROCODONE BITARTRATE AND ACETAMINOPHEN 2 TABLET: 5; 325 TABLET ORAL at 21:00

## 2022-06-01 RX ADMIN — SODIUM CHLORIDE 500 ML: 9 INJECTION, SOLUTION INTRAVENOUS at 17:45

## 2022-06-01 RX ADMIN — ONDANSETRON 4 MG: 2 INJECTION INTRAMUSCULAR; INTRAVENOUS at 18:03

## 2022-06-01 RX ADMIN — SODIUM CHLORIDE: 9 INJECTION, SOLUTION INTRAVENOUS at 22:39

## 2022-06-01 RX ADMIN — CEFEPIME HYDROCHLORIDE 2000 MG: 2 INJECTION, POWDER, FOR SOLUTION INTRAVENOUS at 17:52

## 2022-06-01 ASSESSMENT — PAIN SCALES - GENERAL
PAINLEVEL_OUTOF10: 9
PAINLEVEL_OUTOF10: 9

## 2022-06-01 ASSESSMENT — ENCOUNTER SYMPTOMS
SHORTNESS OF BREATH: 0
VOMITING: 0
ABDOMINAL PAIN: 0
DIARRHEA: 0
WHEEZING: 0
CONSTIPATION: 0
CHEST TIGHTNESS: 0
NAUSEA: 0
COUGH: 0

## 2022-06-01 ASSESSMENT — PAIN DESCRIPTION - LOCATION: LOCATION: ARM;KNEE

## 2022-06-01 ASSESSMENT — PAIN - FUNCTIONAL ASSESSMENT: PAIN_FUNCTIONAL_ASSESSMENT: 0-10

## 2022-06-01 ASSESSMENT — PAIN DESCRIPTION - DESCRIPTORS: DESCRIPTORS: THROBBING

## 2022-06-01 ASSESSMENT — PAIN DESCRIPTION - ORIENTATION: ORIENTATION: RIGHT

## 2022-06-01 NOTE — H&P
[facility]  Office: [address], DO, Joseph Amelia, DO, Sreekanth Aryan, DO, Bakari Gonzalezl Blood, DO, Ryan Horvath MD, Jazz Haque MD, David Roland MD, Saima Mon MD, Murtaza Calles MD, Shara Carey MD, Elise Tracy MD, George Espinoza, DO, Holden Trent MD,  Kim Law, DO, Denise Ni MD, Carlos Armenta MD, Juarez Sanchez MD, Meera Juarez DO, Joel Jimenez MD, Alberta Valverde MD, Raphael Johnson DO, Benjamín Lund MD, Sylvain Ocampo [facility], [facility], CNP, Kasi Torres, CNP, Lula Ardon, CNP, Essence Deal, CNP, Ramón Yancey, CNP, ZAIN AlejandroC, Manjinder Baldwin, DNP, Haider Diamond, CNP, Valentín Uriarte, CNP, Julisa Calzada, CNP, Autumn Garner, CNS, Rosalie Larsen, [facility], Maurice Krause, CNP, Scott Salazar, [facility], Matthew Padron, [facility]   [address]    HISTORY AND PHYSICAL EXAMINATION            Date:   6/1/2022  Patient name:  Jeniffer Ochoa  Date of admission:  6/1/2022  5:15 PM  MRN:   [id number]  Account:  [de-identified]  YOB: 1980  PCP:    Samantha Franz MD  Room:   [place] [address]  Code Status:    Full    Chief Complaint:     Chief Complaint   Patient presents with    Wound Infection     Staph infection to right arm; started on ATB but pt reports not working     History Obtained From:     patient, electronic medical record    History of Present Illness:     Patient presents to the emergency room with complaints of an infection on his right forearm and right knee. Patient works with [employer] and states that he puts baby powder on his arms daily to help protect them, but states that he often gets small cuts. He also is active with jujitsu and wrestling and states that he sweats a lot, rubs up on other sweaty people, as well as sweats under the pads he uses. He first noticed the infected areas on 5/25 and was evaluated in the ED here at [facility].  The patient was sent home on oral prednisone and Permethrin cream (he did not fill the permethrin cream). The infections were not improving and we was then seen at an urgent care facility on 5/27 and was prescribed Doxycycline. The patient still did not see an improvement in his condition and went again to another urgent care facility on 5/30 and was prescribed Bactrim. Despite taking both antibiotic medications, the patient's infections were continuing to worsen. The patient noticed that they started to drain on 5/28 a yellow/clear fluid. He kept the areas covered the best he could. Nothing was making the infections worse or better. He did attempt to take Alieve for the swelling/pain, that too did not help. Patient denies any fevers, chills, chest pain, shortness of breath, nausea and vomiting. Patient states he is normally pretty healthy. Past Medical History:     Past Medical History:   Diagnosis Date    ADHD (attention deficit hyperactivity disorder)     Depression         Past Surgical History:     Past Surgical History:   Procedure Laterality Date    ADENOIDECTOMY AND TYMPANOSTOMY TUBE PLACEMENT      FACIAL COSMETIC SURGERY          Medications Prior to Admission:     Prior to Admission medications    Medication Sig Start Date End Date Taking? Authorizing Provider   permethrin (ELIMITE) 5 % cream Apply topically as directed 5/25/22   JANESSA Carolina CNP   diphenhydrAMINE (BENADRYL) 25 MG capsule Take 1 capsule by mouth every 6 hours as needed for Itching 5/25/22   JANESSA Carolina CNP   amphetamine-dextroamphetamine (ADDERALL XR) 30 MG extended release capsule Take 1 capsule by mouth daily for 30 days.  5/4/22 6/3/22  Raynette Nageotte Momin, MD   cloNIDine (CATAPRES) 0.2 MG tablet Take 1 tablet by mouth 2 times daily 5/4/22   Raynette Nageotte Momin, MD   escitalopram (LEXAPRO) 20 MG tablet Take 1 tablet by mouth daily 5/4/22   Joce Moore MD   naproxen (NAPROSYN) 500 MG tablet Take 1 tablet by mouth 2 times daily (with meals)  Patient Constitutional:       General: He is not in acute distress. Appearance: Normal appearance. HENT:      Head: Normocephalic. Mouth/Throat:      Mouth: Mucous membranes are moist.   Eyes:      Pupils: Pupils are equal, round, and reactive to light. Cardiovascular:      Rate and Rhythm: Normal rate and regular rhythm. Pulses: Normal pulses. Heart sounds: Normal heart sounds. No murmur heard. No friction rub. No gallop. Pulmonary:      Effort: Pulmonary effort is normal. No respiratory distress. Breath sounds: Normal breath sounds. No wheezing or rales. Abdominal:      General: Bowel sounds are normal. There is no distension. Palpations: Abdomen is soft. Tenderness: There is no abdominal tenderness. Musculoskeletal:         General: Normal range of motion. Cervical back: Normal range of motion. Skin:     General: Skin is warm and dry. Capillary Refill: Capillary refill takes less than 2 seconds. Findings: Rash (right forearm and right knee) present. Rash is pustular. Neurological:      General: No focal deficit present. Mental Status: He is alert and oriented to person, place, and time. Motor: No weakness. Psychiatric:         Mood and Affect: Mood normal.         Behavior: Behavior normal.         Thought Content:  Thought content normal.         Judgment: Judgment normal.                 Investigations:      Laboratory Testing:  Recent Results (from the past 24 hour(s))   CBC with Auto Differential    Collection Time: 06/01/22  5:37 PM   Result Value Ref Range    WBC 12.9 (H) 3.5 - 11.3 k/uL    RBC 4.61 4.21 - 5.77 m/uL    Hemoglobin 13.9 13.0 - 17.0 g/dL    Hematocrit 41.8 40.7 - 50.3 %    MCV 90.7 82.6 - 102.9 fL    MCH 30.2 25.2 - 33.5 pg    MCHC 33.3 28.4 - 34.8 g/dL    RDW 13.1 11.8 - 14.4 %    Platelets 956 606 - 123 k/uL    MPV 9.8 8.1 - 13.5 fL    NRBC Automated 0.0 0.0 per 100 WBC    Seg Neutrophils 69 (H) 36 - 65 %    Lymphocytes 17 (L) 24 - 43 %    Monocytes 10 3 - 12 %    Eosinophils % 2 1 - 4 %    Basophils 1 0 - 2 %    Immature Granulocytes 1 (H) 0 %    Segs Absolute 9.00 (H) 1.50 - 8.10 k/uL    Absolute Lymph # 2.20 1.10 - 3.70 k/uL    Absolute Mono # 1.29 (H) 0.10 - 1.20 k/uL    Absolute Eos # 0.26 0.00 - 0.44 k/uL    Basophils Absolute 0.07 0.00 - 0.20 k/uL    Absolute Immature Granulocyte 0.09 0.00 - 0.30 k/uL   Basic Metabolic Panel w/ Reflex to MG    Collection Time: 06/01/22  5:37 PM   Result Value Ref Range    Glucose 140 (H) 70 - 99 mg/dL    BUN 18 6 - 20 mg/dL    CREATININE 1.14 0.70 - 1.20 mg/dL    Bun/Cre Ratio 16 9 - 20    Calcium 8.5 (L) 8.6 - 10.4 mg/dL    Sodium 135 135 - 144 mmol/L    Potassium 4.4 3.7 - 5.3 mmol/L    Chloride 100 98 - 107 mmol/L    CO2 24 20 - 31 mmol/L    Anion Gap 11 9 - 17 mmol/L    GFR Non-African American >60 >60 mL/min    GFR African American >60 >60 mL/min    GFR Comment         Lactic Acid    Collection Time: 06/01/22  5:37 PM   Result Value Ref Range    Lactic Acid 2.1 0.5 - 2.2 mmol/L   Sedimentation Rate    Collection Time: 06/01/22  5:37 PM   Result Value Ref Range    Sed Rate 22 (H) 0 - 15 mm/Hr   C-Reactive Protein    Collection Time: 06/01/22  5:37 PM   Result Value Ref Range    CRP 6.1 (H) 0.0 - 5.0 mg/L       Imaging/Diagnostics:  No results found. Assessment :      Hospital Problems           Last Modified POA    * (Principal) Skin pustule or right forearm and right knee 6/1/2022 Yes    Generalized anxiety disorder 6/1/2022 Yes    Attention deficit hyperactivity disorder (ADHD) 6/1/2022 Yes          Plan:     Patient status inpatient in the  Med/Surge    1. Pustules on right forearm and right knee  1. IV Vancomycin  2. Pharmacy to dose vancomycin  3. Consult ID  4. IV hydration  5. Wound cultures pending  2. Anxiety  1. Resume home medications  3. ADHD  1. Resume home medications  4. Adult diet  5.  Monitor AM labs    Consultations:   PHARMACY TO DOSE VANCOMYCIN  IP CONSULT TO INTERNAL MEDICINE  IP CONSULT TO INFECTIOUS DISEASES  PHARMACY TO DOSE VANCOMYCIN    Patient is admitted as inpatient status because of co-morbidities listed above, severity of signs and symptoms as outlined, requirement for current medical therapies and most importantly because of direct risk to patient if care not provided in a hospital setting. Expected length of stay > 48 hours. On this date 6/1/2022 I have spent 47 minutes reviewing previous notes, test results and face to face with the patient discussing the diagnosis and importance of compliance with the treatment plan as well as documenting on the day of the visit. At least 50% of the time documented was spent with the patient to provide counseling and/or coordination of care.     JANESSA Marquez - CNP  6/1/2022  7:12 PM    Copy sent to Dr. Anant Duncan MD

## 2022-06-01 NOTE — ED NOTES
ED to inpatient nurses report     Chief Complaint   Patient presents with    Wound Infection     Staph infection to right arm; started on ATB but pt reports not working      Present to ED from home. Infection to right arm and right knee getting worse since onset. Has been on 2 rounds of ATB and Benadryl, but getting worse. 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 Denies fever. LOC: alert and orientated to name, place, date  Vital signs   Vitals:    06/01/22 1704   BP: (!) 154/97   Pulse: (!) 124   Resp: 15   Temp: 98.1 °F (36.7 °C)   TempSrc: Oral   SpO2: 97%      Oxygen Baseline     Current needs required    LDAs:   Peripheral IV 06/01/22 Left Antecubital (Active)   Site Assessment Clean, dry & intact 06/01/22 1736   Line Status Blood return noted 06/01/22 1736     Mobility: Independent  Pending ED orders:   Present condition:   Code Status:   Consults: PHARMACY TO DOSE VANCOMYCIN  IP CONSULT TO INTERNAL MEDICINE  IP CONSULT TO INFECTIOUS DISEASES  PHARMACY TO DOSE VANCOMYCIN  [x]  Hospitalist  Completed  [] yes [] no Who:   []  Medicine  Completed  [] yes [] No Who:   []  Cardiology  Completed  [] yes [] No Who:   []  GI   Completed  [] yes [] No Who:   []  Neurology  Completed  [] yes [] No Who:   []  Nephrology Completed  [] yes [] No Who:    []  Vascular  Completed  [] yes [] No Who:   []  Ortho  Completed  [] yes [] No Who:     []  Surgery  Completed  [] yes [] No Who:    []  Urology  Completed  [] yes [] No Who:    []  CT Surgery Completed  [] yes [] No Who:   []  Podiatry  Completed  [] yes [] No Who:    []  Other    Completed  [] yes [] No Who:  Interventions: Norco, Vanco, Maxipime, 500 cc saline bolus  Important Events: Elevated WBC, CRP, and sed rate.  Wound cultures obtained       Electronically signed by Chapito Gupta RN on 6/1/2022 at 6:38 PM       Stephen Hoffman RN  06/01/22 9990

## 2022-06-01 NOTE — ED PROVIDER NOTES
[address] [place] [address] ED  eMERGENCY dEPARTMENT eNCOUnter      Pt Name: Ronald Rosales  MRN: [id number]  [place] 1980  Date of evaluation: 6/1/22      CHIEF COMPLAINT       Chief Complaint   Patient presents with    Wound Infection     Staph infection to right arm; started on ATB but pt reports not working         HISTORY OF PRESENT ILLNESS    Ronald Rosales is a 39 y.o. male who presents complaining of infection to the right arm and right knee he has been on a couple different antibiotics and the symptoms have worsened. Denies any fever. Denies any specific injury. The history is provided by the patient. Rash  Location: Pustular nodular rash to the right arm and right knee. He has been treated with steroids Benadryl 2 different antibiotics and the rash and abscess are getting worse. Quality: draining, painful, redness and swelling    Pain details:     Quality:  Aching    Severity:  Moderate    Onset quality:  Gradual    Duration:  1 week    Timing:  Intermittent    Progression:  Worsening  Severity:  Moderate  Timing:  Constant  Progression:  Worsening  Chronicity:  New  Context comment:  He states that he had and wrestling on some workout mats doing karate moves and he has had a history of previous nodular rash and staph infection in the past  Relieved by:  Nothing  Worsened by:  Nothing  Ineffective treatments:  Antihistamines  Associated symptoms: no fever, no joint pain, no myalgias, not vomiting and not wheezing        REVIEW OF SYSTEMS       Review of Systems   Constitutional: Negative for fever. Respiratory: Negative for wheezing. Gastrointestinal: Negative for vomiting. Musculoskeletal: Negative for arthralgias and myalgias. Skin: Positive for rash. All other systems reviewed and are negative.       PAST MEDICAL HISTORY     Past Medical History:   Diagnosis Date    ADHD (attention deficit hyperactivity disorder)     Depression        SURGICAL HISTORY       Past Surgical History: Skin:     Findings: Erythema present. Comments: There are nodular pustular abscess-like lesions noted to the dorsal aspect of the right forearm just distal to the right elbow. Similar findings are noted to the anterior right knee   Neurological:      Mental Status: He is alert and oriented to person, place, and time. MEDICAL DECISION MAKING:     Pustular rash to the right arm appears to be several separate abscesses to the right forearm and there is also 1 to the right knee. He has failed outpatient therapy 2 different antibiotics Benadryl and steroid patient would benefit from admission IV antibiotics and further evaluation and treatment. He is agreeable with this plan. I did speak with [facility] they did agreed admit the patient he is stable at this time. Awaiting bed number. DIAGNOSTIC RESULTS     EKG: All EKG's are interpreted by the Emergency Department Physician who either signs or Co-signs this chart in the absence of acardiologist.        RADIOLOGY:Allplain film, CT, MRI, and formal ultrasound images (except ED bedside ultrasound) are read by the radiologist and the images and interpretations are directly viewed by the emergency physician. LABS:All lab results were reviewed by myself, and all abnormals are listed below.   Labs Reviewed   CBC WITH AUTO DIFFERENTIAL - Abnormal; Notable for the following components:       Result Value    WBC 12.9 (*)     Seg Neutrophils 69 (*)     Lymphocytes 17 (*)     Immature Granulocytes 1 (*)     Segs Absolute 9.00 (*)     Absolute Mono # 1.29 (*)     All other components within normal limits   BASIC METABOLIC PANEL W/ REFLEX TO MG FOR LOW K - Abnormal; Notable for the following components:    Glucose 140 (*)     Calcium 8.5 (*)     All other components within normal limits   SEDIMENTATION RATE - Abnormal; Notable for the following components:    Sed Rate 22 (*)     All other components within normal limits   C-REACTIVE PROTEIN - Abnormal; Notable for the following components:    CRP 6.1 (*)     All other components within normal limits   CULTURE, BLOOD 1   CULTURE, BLOOD 2   CULTURE, ANAEROBIC AND AEROBIC   LACTIC ACID         EMERGENCY DEPARTMENT COURSE:   Vitals:    Vitals:    06/01/22 1704   BP: (!) 154/97   Pulse: (!) 124   Resp: 15   Temp: 98.1 °F (36.7 °C)   TempSrc: Oral   SpO2: 97%       The patient was given the following medications while in the emergency department:  Orders Placed This Encounter   Medications    cefepime (MAXIPIME) 2000 mg IVPB minibag     Order Specific Question:   Antimicrobial Indications     Answer: Other     Order Specific Question:   Other Abx Indication     Answer: Suspected Sepsis of Skin or Soft Tissue Origin    vancomycin (VANCOCIN) 2,000 mg in dextrose 5 % 500 mL IVPB     Order Specific Question:   Antimicrobial Indications     Answer: Other     Order Specific Question:   Other Abx Indication     Answer: Suspected Sepsis of Skin or Soft Tissue Origin    0.9 % sodium chloride bolus    HYDROcodone-acetaminophen (NORCO) 5-325 MG per tablet 1 tablet    ondansetron (ZOFRAN) injection 4 mg       -------------------------      CRITICAL CARE:     CONSULTS:  PHARMACY TO DOSE VANCOMYCIN  IP CONSULT TO INTERNAL MEDICINE  IP CONSULT TO INFECTIOUS DISEASES  PHARMACY TO DOSE VANCOMYCIN    PROCEDURES:  Procedures    FINAL IMPRESSION      1. Local infection of skin and subcutaneous tissue          DISPOSITION/PLAN   DISPOSITION Admitted 06/01/2022 06:33:35 PM      PATIENT REFERREDTO:  No follow-up provider specified.     DISCHARGEMEDICATIONS:  New Prescriptions    No medications on file       (Please note that portions of this note were completed with a voice recognition program.  Efforts were made to edit thedictations but occasionally words are mis-transcribed.)    KARIME Baker PA-C  06/01/22 2215

## 2022-06-02 ENCOUNTER — APPOINTMENT (OUTPATIENT)
Dept: CT IMAGING | Age: 42
DRG: 580 | End: 2022-06-02
Payer: COMMERCIAL

## 2022-06-02 LAB
ABSOLUTE EOS #: 0.34 K/UL (ref 0–0.44)
ABSOLUTE IMMATURE GRANULOCYTE: 0.09 K/UL (ref 0–0.3)
ABSOLUTE LYMPH #: 2.5 K/UL (ref 1.1–3.7)
ABSOLUTE MONO #: 1.18 K/UL (ref 0.1–1.2)
ANION GAP SERPL CALCULATED.3IONS-SCNC: 5 MMOL/L (ref 9–17)
BASOPHILS # BLD: 1 % (ref 0–2)
BASOPHILS ABSOLUTE: 0.08 K/UL (ref 0–0.2)
BUN BLDV-MCNC: 15 MG/DL (ref 6–20)
BUN/CREAT BLD: 15 (ref 9–20)
CALCIUM SERPL-MCNC: 8.2 MG/DL (ref 8.6–10.4)
CHLORIDE BLD-SCNC: 105 MMOL/L (ref 98–107)
CO2: 29 MMOL/L (ref 20–31)
CREAT SERPL-MCNC: 1 MG/DL (ref 0.7–1.2)
EOSINOPHILS RELATIVE PERCENT: 4 % (ref 1–4)
GFR AFRICAN AMERICAN: >60 ML/MIN
GFR NON-AFRICAN AMERICAN: >60 ML/MIN
GFR SERPL CREATININE-BSD FRML MDRD: ABNORMAL ML/MIN/{1.73_M2}
GLUCOSE BLD-MCNC: 106 MG/DL (ref 70–99)
HCT VFR BLD CALC: 42.1 % (ref 40.7–50.3)
HEMOGLOBIN: 13.5 G/DL (ref 13–17)
IMMATURE GRANULOCYTES: 1 %
LYMPHOCYTES # BLD: 26 % (ref 24–43)
MCH RBC QN AUTO: 30.2 PG (ref 25.2–33.5)
MCHC RBC AUTO-ENTMCNC: 32.1 G/DL (ref 28.4–34.8)
MCV RBC AUTO: 94.2 FL (ref 82.6–102.9)
MONOCYTES # BLD: 13 % (ref 3–12)
NRBC AUTOMATED: 0 PER 100 WBC
PDW BLD-RTO: 13 % (ref 11.8–14.4)
PLATELET # BLD: 247 K/UL (ref 138–453)
PMV BLD AUTO: 9.8 FL (ref 8.1–13.5)
POTASSIUM SERPL-SCNC: 4.7 MMOL/L (ref 3.7–5.3)
RBC # BLD: 4.47 M/UL (ref 4.21–5.77)
SEG NEUTROPHILS: 55 % (ref 36–65)
SEGMENTED NEUTROPHILS ABSOLUTE COUNT: 5.28 K/UL (ref 1.5–8.1)
SODIUM BLD-SCNC: 139 MMOL/L (ref 135–144)
VANCOMYCIN RANDOM: 10.9 UG/ML
VANCOMYCIN TROUGH: 8 UG/ML (ref 10–20)
WBC # BLD: 9.5 K/UL (ref 3.5–11.3)

## 2022-06-02 PROCEDURE — 80048 BASIC METABOLIC PNL TOTAL CA: CPT

## 2022-06-02 PROCEDURE — 6360000002 HC RX W HCPCS: Performed by: NURSE PRACTITIONER

## 2022-06-02 PROCEDURE — 73701 CT LOWER EXTREMITY W/DYE: CPT

## 2022-06-02 PROCEDURE — 6370000000 HC RX 637 (ALT 250 FOR IP): Performed by: NURSE PRACTITIONER

## 2022-06-02 PROCEDURE — 2580000003 HC RX 258: Performed by: FAMILY MEDICINE

## 2022-06-02 PROCEDURE — 99232 SBSQ HOSP IP/OBS MODERATE 35: CPT | Performed by: FAMILY MEDICINE

## 2022-06-02 PROCEDURE — 6370000000 HC RX 637 (ALT 250 FOR IP): Performed by: FAMILY MEDICINE

## 2022-06-02 PROCEDURE — 85025 COMPLETE CBC W/AUTO DIFF WBC: CPT

## 2022-06-02 PROCEDURE — 80202 ASSAY OF VANCOMYCIN: CPT

## 2022-06-02 PROCEDURE — 1200000000 HC SEMI PRIVATE

## 2022-06-02 PROCEDURE — 99221 1ST HOSP IP/OBS SF/LOW 40: CPT | Performed by: INTERNAL MEDICINE

## 2022-06-02 PROCEDURE — 6360000004 HC RX CONTRAST MEDICATION: Performed by: FAMILY MEDICINE

## 2022-06-02 PROCEDURE — 36415 COLL VENOUS BLD VENIPUNCTURE: CPT

## 2022-06-02 PROCEDURE — 2580000003 HC RX 258: Performed by: NURSE PRACTITIONER

## 2022-06-02 RX ORDER — 0.9 % SODIUM CHLORIDE 0.9 %
100 INTRAVENOUS SOLUTION INTRAVENOUS ONCE
Status: COMPLETED | OUTPATIENT
Start: 2022-06-02 | End: 2022-06-02

## 2022-06-02 RX ORDER — SODIUM CHLORIDE 0.9 % (FLUSH) 0.9 %
10 SYRINGE (ML) INJECTION PRN
Status: DISCONTINUED | OUTPATIENT
Start: 2022-06-02 | End: 2022-06-04 | Stop reason: HOSPADM

## 2022-06-02 RX ORDER — MORPHINE SULFATE 2 MG/ML
2 INJECTION, SOLUTION INTRAMUSCULAR; INTRAVENOUS EVERY 4 HOURS PRN
Status: DISCONTINUED | OUTPATIENT
Start: 2022-06-02 | End: 2022-06-03

## 2022-06-02 RX ORDER — HYDROCODONE BITARTRATE AND ACETAMINOPHEN 5; 325 MG/1; MG/1
1 TABLET ORAL EVERY 6 HOURS PRN
Status: DISCONTINUED | OUTPATIENT
Start: 2022-06-02 | End: 2022-06-02

## 2022-06-02 RX ORDER — MORPHINE SULFATE 4 MG/ML
4 INJECTION, SOLUTION INTRAMUSCULAR; INTRAVENOUS EVERY 4 HOURS PRN
Status: DISCONTINUED | OUTPATIENT
Start: 2022-06-02 | End: 2022-06-03

## 2022-06-02 RX ADMIN — IOPAMIDOL 75 ML: 755 INJECTION, SOLUTION INTRAVENOUS at 20:56

## 2022-06-02 RX ADMIN — SODIUM CHLORIDE, PRESERVATIVE FREE 10 ML: 5 INJECTION INTRAVENOUS at 20:56

## 2022-06-02 RX ADMIN — HYDROCODONE BITARTRATE AND ACETAMINOPHEN 1 TABLET: 5; 325 TABLET ORAL at 11:26

## 2022-06-02 RX ADMIN — VANCOMYCIN HYDROCHLORIDE 750 MG: 750 INJECTION, POWDER, LYOPHILIZED, FOR SOLUTION INTRAVENOUS at 06:26

## 2022-06-02 RX ADMIN — VANCOMYCIN HYDROCHLORIDE 1000 MG: 1 INJECTION, POWDER, LYOPHILIZED, FOR SOLUTION INTRAVENOUS at 17:27

## 2022-06-02 RX ADMIN — HYDROCODONE BITARTRATE AND ACETAMINOPHEN 1 TABLET: 5; 325 TABLET ORAL at 17:24

## 2022-06-02 RX ADMIN — ESCITALOPRAM OXALATE 20 MG: 10 TABLET ORAL at 07:51

## 2022-06-02 RX ADMIN — SODIUM CHLORIDE: 9 INJECTION, SOLUTION INTRAVENOUS at 06:24

## 2022-06-02 RX ADMIN — MORPHINE SULFATE 4 MG: 4 INJECTION, SOLUTION INTRAMUSCULAR; INTRAVENOUS at 20:33

## 2022-06-02 RX ADMIN — SODIUM CHLORIDE 80 ML: 9 INJECTION, SOLUTION INTRAVENOUS at 20:56

## 2022-06-02 RX ADMIN — HYDROCODONE BITARTRATE AND ACETAMINOPHEN 2 TABLET: 5; 325 TABLET ORAL at 02:56

## 2022-06-02 RX ADMIN — SODIUM CHLORIDE, PRESERVATIVE FREE 10 ML: 5 INJECTION INTRAVENOUS at 07:51

## 2022-06-02 RX ADMIN — CLONIDINE HYDROCHLORIDE 0.2 MG: 0.2 TABLET ORAL at 20:33

## 2022-06-02 RX ADMIN — CLONIDINE HYDROCHLORIDE 0.2 MG: 0.2 TABLET ORAL at 07:51

## 2022-06-02 ASSESSMENT — PAIN DESCRIPTION - DESCRIPTORS
DESCRIPTORS: BURNING;SHARP
DESCRIPTORS: BURNING
DESCRIPTORS: BURNING;THROBBING
DESCRIPTORS: BURNING

## 2022-06-02 ASSESSMENT — PAIN DESCRIPTION - LOCATION
LOCATION: ARM
LOCATION: LEG

## 2022-06-02 ASSESSMENT — PAIN SCALES - GENERAL
PAINLEVEL_OUTOF10: 8
PAINLEVEL_OUTOF10: 10
PAINLEVEL_OUTOF10: 6
PAINLEVEL_OUTOF10: 8
PAINLEVEL_OUTOF10: 10
PAINLEVEL_OUTOF10: 7

## 2022-06-02 ASSESSMENT — PAIN - FUNCTIONAL ASSESSMENT
PAIN_FUNCTIONAL_ASSESSMENT: ACTIVITIES ARE NOT PREVENTED

## 2022-06-02 ASSESSMENT — PAIN DESCRIPTION - ORIENTATION
ORIENTATION: RIGHT

## 2022-06-02 ASSESSMENT — ENCOUNTER SYMPTOMS
NAUSEA: 0
VOMITING: 0
SINUS PRESSURE: 0
COLOR CHANGE: 1
DIARRHEA: 0
BACK PAIN: 0
VOICE CHANGE: 0
CHEST TIGHTNESS: 0
CONSTIPATION: 0
SHORTNESS OF BREATH: 0
WHEEZING: 0
CHOKING: 0
COUGH: 0
ABDOMINAL PAIN: 0
RHINORRHEA: 0

## 2022-06-02 ASSESSMENT — PAIN DESCRIPTION - PAIN TYPE
TYPE: ACUTE PAIN

## 2022-06-02 ASSESSMENT — PAIN DESCRIPTION - FREQUENCY
FREQUENCY: CONTINUOUS
FREQUENCY: CONTINUOUS

## 2022-06-02 ASSESSMENT — PAIN DESCRIPTION - ONSET
ONSET: ON-GOING
ONSET: ON-GOING

## 2022-06-02 NOTE — CONSULTS
[facility]  Initial Consult Note  Date: 6/2/2022    Hospital day :1     Impression:   1. Right upper extremity/forearm pustular lesions with associated cellulitis  2. Right knee soft tissue abscess    Recommendations   · Findings are consistent with staphylococcal infection. · I agree with intravenous antimicrobial therapy with vancomycin. · There are a few of the skin lesions that seem to be consistent with abscess formation. · I would recommend a general surgery evaluation for potential I&D. · The patient can likely be discharged with a plan for outpatient dalbavancin versus oral linezolid on whether this turns out to be MRSA versus MSSA    Chief complaint/reason for consultation:   Pustular skin lesions    History of Present Illness:   Hyun Louise is a 39y.o.-year-old male who was initially admitted on 6/1/2022. Melisa Benitez has no significant past medical history and he tells me that he recently started taekwondo and has been participating in [employer]. About 2 weeks ago he reports he started to develop lesions on his skin in the right arm as well as at the right knee. On Wednesday about a week ago he ended up coming here was diagnosed with a rash started on prednisone and sent home but by Friday he was getting worsening lesions and he ended up going to an urgent care on [address] where he was started on doxycycline for what was presumed to be a staphylococcal skin infection. The patient reports that the skin lesions were worsening over the weekend and on Monday he went to an urgent care and remains well with the doxycycline was switched to Bactrim. The patient has had continued worsening soft tissue changes with increasing pain and pustular like changes and he came back into the emergency department and has since been admitted started on IV antimicrobial therapy with vancomycin. The patient did have some purulent material expressed and so far the culture has grown out Staph aureus.   I was asked to evaluate and help with antibiotic choice. I have personally reviewed the past medical history, past surgical history, medications, social history, and family history, and I have updated the database accordingly. Past Medical History:     Past Medical History:   Diagnosis Date    ADHD (attention deficit hyperactivity disorder)     Depression      Past Surgical  History:     Past Surgical History:   Procedure Laterality Date    ADENOIDECTOMY AND TYMPANOSTOMY TUBE PLACEMENT      FACIAL COSMETIC SURGERY       Medications:      vancomycin  1,000 mg IntraVENous Q12H    amphetamine-dextroamphetamine  30 mg Oral Daily    cloNIDine  0.2 mg Oral BID    escitalopram  20 mg Oral Daily    sodium chloride flush  5-40 mL IntraVENous 2 times per day    enoxaparin  40 mg SubCUTAneous Daily    vancomycin (VANCOCIN) intermittent dosing (placeholder)   Other RX Placeholder     Social History:     Social History     Socioeconomic History    Marital status: Single     Spouse name: Not on file    Number of children: Not on file    Years of education: Not on file    Highest education level: Not on file   Occupational History    Not on file   Tobacco Use    Smoking status: Never Smoker    Smokeless tobacco: Never Used   Substance and Sexual Activity    Alcohol use: No    Drug use: No    Sexual activity: Not on file   Other Topics Concern    Not on file   Social History Narrative    Not on file     Social Determinants of Health     Financial Resource Strain: Low Risk     Difficulty of Paying Living Expenses: Not hard at all   Food Insecurity: No Food Insecurity    Worried About Running Out of Food in the Last Year: Never true    Vladimir of Food in the Last Year: Never true   Transportation Needs:     Lack of Transportation (Medical): Not on file    Lack of Transportation (Non-Medical):  Not on file   Physical Activity:     Days of Exercise per Week: Not on file    Minutes of Exercise per Session: equal, round, reactive, to light and accommodation; extraocular movements intact; sclera anicteric; conjunctivae pink  ENT: Oropharynx clear, without erythema, exudate, or thrush. Neck: Supple, without lymphadenopathy. Pulmonary/Chest: Clear to auscultation, without wheezes, rales, or rhonchi  Cardiovascular: Regular rate and rhythm without murmurs, rubs, or gallops. Abdomen: Soft, nontender, nondistended. Extremities: No cyanosis, clubbing, edema, or effusions. Neurologic: No gross sensory or motor deficits. Skin: There are multiple soft tissue abscesses appreciated in the right forearm as well as 1 over the right lower knee. Medical Decision Making:   I have independently reviewed/ordered the following labs:  CBC with Differential:   Recent Labs     06/01/22 1737 06/02/22  0530   WBC 12.9* 9.5   HGB 13.9 13.5   HCT 41.8 42.1    247   LYMPHOPCT 17* 26   MONOPCT 10 13*     BMP:   Recent Labs     06/01/22 1737 06/02/22  0530    139   K 4.4 4.7    105   CO2 24 29   BUN 18 15   CREATININE 1.14 1.00     Hepatic Function Panel: No results for input(s): PROT, LABALBU, BILIDIR, IBILI, BILITOT, ALKPHOS, ALT, AST in the last 72 hours. No results found for: PROCAL    Lab Results   Component Value Date    CRP 6.1 06/01/2022     No results found for: FERRITIN  No results found for: FIBRINOGEN  No results found for: DDIMER  No results found for: LDH    Lab Results   Component Value Date    SEDRATE 22 (H) 06/01/2022       No results found for: COVID19  No results found for requested labs within last 30 days. Imaging Studies:   No results found. Cultures:     Culture, Blood 1 [[id number]] (Abnormal) Collected: 06/01/22 1737   Order Status: Completed Specimen: Blood Updated: 06/02/22 1623    Specimen Description . BLOOD    Special Requests LT AC,12ML    Culture POSITIVE Blood Culture Abnormal      DIRECT GRAM STAIN FROM BOTTLE: GRAM POSITIVE COCCI IN CLUSTERS     Staphylococcus

## 2022-06-02 NOTE — CONSULTS
[facility] Pharmacokinetic Monitoring Service - Vancomycin     Jeniffer Ochoa is a 39 y.o. male starting on vancomycin therapy for SSTI. Pharmacy consulted by Shamar Alcala for monitoring and adjustment. Target Concentration: Goal trough of 10-15 mg/L and AUC/ABEBE <500 mg*hr/L    Additional Antimicrobials: Cefepime was given in ER    Pertinent Laboratory Values: Wt Readings from Last 1 Encounters:   06/01/22 170 lb 14.4 oz (77.5 kg)     Temp Readings from Last 1 Encounters:   06/01/22 97.9 °F (36.6 °C) (Oral)     Estimated Creatinine Clearance: 85 mL/min (based on SCr of 1.14 mg/dL). Recent Labs     06/01/22  1737   CREATININE 1.14   WBC 12.9*       MRSA Nasal Swab: N/A. Non-respiratory infection.     Plan:  Dosing recommendations based on Bayesian software  Start vancomycin 2000mg once followed by 750mg q12h  Anticipated AUC of 407 and trough concentration of 13.0 at steady state  Renal labs as indicated   Vancomycin concentration ordered for 6/2 @ 1200   Pharmacy will continue to monitor patient and adjust therapy as indicated    Thank you for the consult,  Sania Gandhi [facility]  6/1/2022 9:12 PM

## 2022-06-02 NOTE — CARE COORDINATION
Case Management Initial Discharge Plan  Andrés Klein,             Met with:patient to discuss discharge plans. Information verified: address, contacts, phone number, , insurance Yes  PCP: Tony Still MD  Date of last visit: 2022    Insurance Provider: [place] [employer]    Discharge Planning    Living Arrangements:  Children   Support Systems:  [address] [place] [employer] Members    Home has 2nd story duplex  20 stairs to climb to get into front door, 20 stairs to climb to reach second floor  Location of bedroom/bathroom in home - main floor of duplex    Patient able to perform ADL's:Independent    Current Services (outpatient & in home) none  DME equipment: none  DME provider: N/A    [facility]: [facility] [place]    Potential Assistance Needed:  N/A    Patient agreeable to home care: No  [place] of choice provided:  n/a    Prior SNF/Rehab Placement and Facility: No  Agreeable to SNF/Rehab: No  [place] of choice provided: n/a   Evaluation: n/a    Expected Discharge date:     Patient expects to be discharged to:   home  Follow Up Appointment: Best Day/ Time: Thursday AM    Transportation provider: drove self  Transportation arrangements needed for discharge: No    Readmission Risk              Risk of Unplanned Readmission:  10             Does patient have a readmission risk score greater than 14?: No  If yes, follow-up appointment must be made within 7 days of discharge. Goal of Care:       Discharge Plan: Met with patient at bedside. Lives alone, independent and works full time. Admitted for painful skin infection rt arm and rt knee. Has worsened over past 10 days and has been treated OP with oral ATB and steroids. Currently on IV Vanco.  ID consulted and awaiting recommendations. Continue to follow.           Electronically signed by Stephania Lea RN on 22 at 8:54 AM EDT

## 2022-06-02 NOTE — PROGRESS NOTES
[facility] Pharmacokinetic Monitoring Service - Vancomycin    Consulting Provider: NIR May CNP  Indication: SSTI  Target Concentration: Goal trough of 10-15 mg/L and AUC/ABEBE <500 mg*hr/L  Day of Therapy: 2  Additional Antimicrobials: none     Pertinent Laboratory Values: Wt Readings from Last 1 Encounters:   06/02/22 171 lb 9.6 oz (77.8 kg)     Temp Readings from Last 1 Encounters:   06/02/22 97.4 °F (36.3 °C) (Oral)     Estimated Creatinine Clearance: 97 mL/min (based on SCr of 1 mg/dL). Recent Labs     06/01/22  1737 06/02/22  0530   CREATININE 1.14 1.00   WBC 12.9* 9.5     Procalcitonin: none     Pertinent Cultures: wound growing staph aureus, no growth blood x 1 day  MRSA Nasal Swab: N/A. Non-respiratory infection.     Recent vancomycin administrations                     vancomycin (VANCOCIN) 750 mg in dextrose 5 % 250 mL IVPB (mg) 750 mg New Bag 06/02/22 0626    vancomycin (VANCOCIN) 2,000 mg in dextrose 5 % 500 mL IVPB (mg) 2,000 mg New Bag 06/01/22 1841                    Assessment:  Date/Time Current Dose Concentration Timing of Concentration (h) AUC   6/2/22 750mg q12h  10.9 5h35 min 332   Note: Serum concentrations collected for AUC dosing may appear elevated if collected in close proximity to the dose administered, this is not necessarily an indication of toxicity    Plan:  Current dosing regimen is sub-therapeutic  Increase dose to vancomycin 1000mg ivpb q12h   Repeat vancomycin concentration ordered for 6/4 @ 1200   Pharmacy will continue to monitor patient and adjust therapy as indicated    Thank you for the consult,  POLO You [facility]  6/2/2022 12:49 PM

## 2022-06-02 NOTE — PROGRESS NOTES
Physician Progress Note      PATIENT:               Chandler Lee  CSN #:                  [id number]  :                       1980  ADMIT DATE:       2022 5:15 PM  DISCH DATE:  RESPONDING  PROVIDER #:        Tomas Gallegos MD          QUERY TEXT:    Pt admitted with pustule to forearm and knee. If possible, please document in   the progress notes and discharge summary if you are evaluating and /or   treating any of the following: The medical record reflects the following:  Risk Factors: Skin pustule, PMH of staph infection  Clinical Indicators: Wbc of 12.9, LA of 2.1, CRP of 6.1, Sed rate of 22,   tachycardic with HR as high as 124  Treatment: IVF Bolus of 500cc, IV Cefepime and IV Vancomycin for suspected   sepsis/infection, monitoring    Thank you,  Nori Seay RN  Options provided:  -- Sepsis, present on admission  -- Sepsis was ruled out  -- Other - I will add my own diagnosis  -- Disagree - Not applicable / Not valid  -- Disagree - Clinically unable to determine / Unknown  -- Refer to Clinical Documentation Reviewer    PROVIDER RESPONSE TEXT:    After further study, sepsis was ruled out for this patient. Query created by:  Adalberto Stubbs on 2022 5:36 AM      Electronically signed by:  Tomas Gallegos MD 2022 6:22 PM

## 2022-06-02 NOTE — PLAN OF CARE
Problem: Discharge Planning  Goal: Discharge to home or other facility with appropriate resources  6/2/2022 0907 by Josue Oliver RN  Outcome: Progressing  Flowsheets (Taken 6/2/2022 3864)  Discharge to home or other facility with appropriate resources:   Identify barriers to discharge with patient and caregiver   Arrange for needed discharge resources and transportation as appropriate   Identify discharge learning needs (meds, wound care, etc)   Refer to discharge planning if patient needs post-hospital services based on physician order or complex needs related to functional status, cognitive ability or social support system  6/2/2022 0048 by Tamera Xiong, RN  Outcome: Progressing     Problem: Pain  Goal: Verbalizes/displays adequate comfort level or baseline comfort level  6/2/2022 0907 by Josue Oliver RN  Outcome: Progressing  6/2/2022 0048 by Tamera Xiong, RN  Outcome: Progressing

## 2022-06-02 NOTE — PROGRESS NOTES
[facility]  Office: [address], DO, Kellie Marie, DO, Joaquín Dry, DO, Liana Rosales Blood, DO, Carla Gonzalez MD, Rober Hdz MD, Jluis Cruz MD, Freddy Haynes MD, Mati Gates MD, Antonia Prater MD, Cara Estrella MD, Cele Neal, DO, Honey May MD,  Jorge Centeno, DO, Jemma Larson MD, Easton Byrd MD, Zak Cole MD, Tay Richey DO, Noman Adkins MD, Reinaldo Burrows MD, Claudell Orion, DO, Missael Salas MD, [facility], [facility], [facility], CNP, Memo Dexter, CNP, Nba Burnett, CNP, Jada Hermosillo, CNP, Maurice Fairbanks, CNP, Sparkle Lozada PA-C, Yamile Madsen, [facility], Kinjal Piedra, CNP, Berlinda Boast, CNP, Regina Tam, CNP, Kyle Lagos, CNS, Kaylee Liang, [facility], Juan Manuel Rowland, CNP, Cherelle Valle, CNP, [facility]      Daily Progress Note     Admit Date: 6/1/2022  Bed/Room No.  2004/2004-02  Admitting Physician : Jluis Cruz MD  Code Status :Full Code  Hospital Day:  LOS: 1 day   Chief Complaint:     Chief Complaint   Patient presents with    Wound Infection     Staph infection to right arm; started on ATB but pt reports not working     Principal Problem:    Skin pustule or right forearm and right knee  Active Problems:    Generalized anxiety disorder    Attention deficit hyperactivity disorder (ADHD)  Resolved Problems:    * No resolved hospital problems. *    Subjective : Interval History/Significant events :  06/02/22    Patient has cellulitis and abscess of right forearm and right leg. He denies any fever, chills. Patient is also having pus on pressure at abscess. He remains on IV antibiotics cefepime Vanco.  Vitals - Stable afebrile  Labs -leukocytosis resolved, staph aureus light growth. Nursing notes , Consults notes reviewed. Overnight events and updates discussed with Nursing staff .    Background History:         Ryan Sidhu is 39 y.o. male  Who was admitted to the hospital Negative for back pain. Skin: Positive for color change and wound. Negative for rash. Neurological: Negative for dizziness, weakness, light-headedness and headaches. Hematological: Does not bruise/bleed easily. Psychiatric/Behavioral: Negative for agitation, behavioral problems, confusion, self-injury, sleep disturbance and suicidal ideas. Objective :      Current Vitals : Temp: 97.3 °F (36.3 °C),  Heart Rate: 71, Resp: 16, BP: 117/64, SpO2: 97 %  Last 24 Hrs Vitals   Patient Vitals for the past 24 hrs:   BP Temp Temp src Pulse Resp SpO2 Height Weight   06/02/22 0732 117/64 97.3 °F (36.3 °C) Oral 71 16 97 % -- --   06/02/22 0019 -- -- -- -- -- -- -- 171 lb 9.6 oz (77.8 kg)   06/01/22 2012 -- -- -- -- -- -- 5' 9\" (1.753 m) 170 lb 14.4 oz (77.5 kg)   06/01/22 1953 110/62 97.9 °F (36.6 °C) Oral 86 16 95 % -- --   06/01/22 1704 (!) 154/97 98.1 °F (36.7 °C) Oral (!) 124 15 97 % -- --     Intake / output   05/31 1901 - 06/02 0700  In: 984.5 [I.V.:964.9]  Out: -   Physical Exam:  Physical Exam  Vitals and nursing note reviewed. Constitutional:       General: He is not in acute distress. Appearance: He is not diaphoretic. HENT:      Head: Normocephalic and atraumatic. Nose:      Right Sinus: No maxillary sinus tenderness or frontal sinus tenderness. Left Sinus: No maxillary sinus tenderness or frontal sinus tenderness. Mouth/Throat:      Pharynx: No oropharyngeal exudate. Eyes:      General: No scleral icterus. Conjunctiva/sclera: Conjunctivae normal.      Pupils: Pupils are equal, round, and reactive to light. Neck:      Thyroid: No thyromegaly. Vascular: No JVD. Cardiovascular:      Rate and Rhythm: Normal rate and regular rhythm. Pulses:           Dorsalis pedis pulses are 2+ on the right side and 2+ on the left side. Heart sounds: Normal heart sounds. No murmur heard.       Pulmonary:      Effort: Pulmonary effort is normal.      Breath sounds: Normal breath sounds. No wheezing or rales. Abdominal:      Palpations: Abdomen is soft. There is no mass. Tenderness: There is no abdominal tenderness. Musculoskeletal:      Cervical back: Full passive range of motion without pain and neck supple. Lymphadenopathy:      Head:      Right side of head: No submandibular adenopathy. Left side of head: No submandibular adenopathy. Cervical: No cervical adenopathy. Skin:     General: Skin is warm. Comments: Right forearm multiple abscess. Pus on pressure. Culture sent. Right leg cellulitis   Neurological:      Mental Status: He is alert and oriented to person, place, and time. Motor: No tremor. Psychiatric:         Behavior: Behavior is cooperative. Laboratory findings:    Recent Labs     06/01/22 1737 06/02/22  0530   WBC 12.9* 9.5   HGB 13.9 13.5   HCT 41.8 42.1    247   SEDRATE 22*  --      Recent Labs     06/01/22 1737 06/02/22  0530    139   K 4.4 4.7    105   CO2 24 29   GLUCOSE 140* 106*   BUN 18 15   CREATININE 1.14 1.00   CALCIUM 8.5* 8.2*     No results for input(s): PROT, LABALBU, LABA1C, I9ULKDF, A9RSZAI, FT4, TSH, AST, ALT, LDH, GGT, ALKPHOS, BILITOT, BILIDIR, AMMONIA, AMYLASE, LIPASE, LACTATE, CHOL, HDL, LDLCHOLESTEROL, CHOLHDLRATIO, TRIG, VLDL, BNP, TROPONINI, CKTOTAL, CKMB, CKMBINDEX, RF, TIFFANY in the last 72 hours. No results found for: Cyndy Gonzalez, [address] [place] [address], BLOODU, BACTERIA, NITRU, [facility], LEUKOCYTESUR    Imaging / Clinical Data :-   No results found. Clinical Course : stable  Assessment and Plan  :        1. Right forearm and right leg cellulitis IV antibiotics, ID consult. Follow C/S   2. Depression - stable   3. ADHD - on adderall     Discharge planning . Will need close follow up with PCP>     Continue to monitor vitals , Intake / output ,  Cell count , HGB , Kidney function, oxygenation  as indicated . Plan and updates discussed with patient ,  answers  explained to satisfaction. Plan discussed with Staff Andreia  RN     (Please note that portions of this note were completed with a voice recognition program. Efforts were made to edit the dictations but occasionally words are mis-transcribed.)      Evelina Alvarez MD  6/2/2022

## 2022-06-03 LAB
CULTURE: ABNORMAL
DIRECT EXAM: ABNORMAL
DIRECT EXAM: ABNORMAL
Lab: ABNORMAL
SPECIMEN DESCRIPTION: ABNORMAL
SPECIMEN DESCRIPTION: ABNORMAL

## 2022-06-03 PROCEDURE — 87070 CULTURE OTHR SPECIMN AEROBIC: CPT

## 2022-06-03 PROCEDURE — 6360000002 HC RX W HCPCS: Performed by: NURSE PRACTITIONER

## 2022-06-03 PROCEDURE — 99232 SBSQ HOSP IP/OBS MODERATE 35: CPT | Performed by: FAMILY MEDICINE

## 2022-06-03 PROCEDURE — 86403 PARTICLE AGGLUT ANTBDY SCRN: CPT

## 2022-06-03 PROCEDURE — 0JDG0ZZ EXTRACTION OF RIGHT LOWER ARM SUBCUTANEOUS TISSUE AND FASCIA, OPEN APPROACH: ICD-10-PCS | Performed by: SURGERY

## 2022-06-03 PROCEDURE — 6370000000 HC RX 637 (ALT 250 FOR IP): Performed by: FAMILY MEDICINE

## 2022-06-03 PROCEDURE — 87205 SMEAR GRAM STAIN: CPT

## 2022-06-03 PROCEDURE — 99232 SBSQ HOSP IP/OBS MODERATE 35: CPT | Performed by: INTERNAL MEDICINE

## 2022-06-03 PROCEDURE — 1200000000 HC SEMI PRIVATE

## 2022-06-03 PROCEDURE — 2580000003 HC RX 258: Performed by: NURSE PRACTITIONER

## 2022-06-03 PROCEDURE — 6370000000 HC RX 637 (ALT 250 FOR IP): Performed by: NURSE PRACTITIONER

## 2022-06-03 PROCEDURE — 87186 SC STD MICRODIL/AGAR DIL: CPT

## 2022-06-03 PROCEDURE — 6360000002 HC RX W HCPCS: Performed by: STUDENT IN AN ORGANIZED HEALTH CARE EDUCATION/TRAINING PROGRAM

## 2022-06-03 PROCEDURE — 87075 CULTR BACTERIA EXCEPT BLOOD: CPT

## 2022-06-03 PROCEDURE — 0JDN0ZZ EXTRACTION OF RIGHT LOWER LEG SUBCUTANEOUS TISSUE AND FASCIA, OPEN APPROACH: ICD-10-PCS | Performed by: SURGERY

## 2022-06-03 RX ORDER — LIDOCAINE HYDROCHLORIDE AND EPINEPHRINE 10; 10 MG/ML; UG/ML
40 INJECTION, SOLUTION INFILTRATION; PERINEURAL ONCE
Status: DISCONTINUED | OUTPATIENT
Start: 2022-06-03 | End: 2022-06-04 | Stop reason: HOSPADM

## 2022-06-03 RX ORDER — OXYCODONE HYDROCHLORIDE AND ACETAMINOPHEN 5; 325 MG/1; MG/1
1 TABLET ORAL EVERY 4 HOURS PRN
Status: DISCONTINUED | OUTPATIENT
Start: 2022-06-03 | End: 2022-06-04 | Stop reason: HOSPADM

## 2022-06-03 RX ORDER — DOXYCYCLINE HYCLATE 100 MG/1
100 CAPSULE ORAL 2 TIMES DAILY
Status: ON HOLD | COMMUNITY
End: 2022-06-04 | Stop reason: HOSPADM

## 2022-06-03 RX ADMIN — ESCITALOPRAM OXALATE 20 MG: 10 TABLET ORAL at 08:06

## 2022-06-03 RX ADMIN — SODIUM CHLORIDE: 9 INJECTION, SOLUTION INTRAVENOUS at 13:03

## 2022-06-03 RX ADMIN — MORPHINE SULFATE 4 MG: 4 INJECTION, SOLUTION INTRAMUSCULAR; INTRAVENOUS at 09:48

## 2022-06-03 RX ADMIN — OXYCODONE AND ACETAMINOPHEN 1 TABLET: 5; 325 TABLET ORAL at 15:20

## 2022-06-03 RX ADMIN — CLONIDINE HYDROCHLORIDE 0.2 MG: 0.2 TABLET ORAL at 20:27

## 2022-06-03 RX ADMIN — MORPHINE SULFATE 4 MG: 4 INJECTION, SOLUTION INTRAMUSCULAR; INTRAVENOUS at 05:46

## 2022-06-03 RX ADMIN — MORPHINE SULFATE 4 MG: 4 INJECTION, SOLUTION INTRAMUSCULAR; INTRAVENOUS at 01:41

## 2022-06-03 RX ADMIN — VANCOMYCIN HYDROCHLORIDE 1000 MG: 1 INJECTION, POWDER, LYOPHILIZED, FOR SOLUTION INTRAVENOUS at 05:48

## 2022-06-03 RX ADMIN — VANCOMYCIN HYDROCHLORIDE 1000 MG: 1 INJECTION, POWDER, LYOPHILIZED, FOR SOLUTION INTRAVENOUS at 16:59

## 2022-06-03 RX ADMIN — HYDROMORPHONE HYDROCHLORIDE 0.5 MG: 1 INJECTION, SOLUTION INTRAMUSCULAR; INTRAVENOUS; SUBCUTANEOUS at 08:03

## 2022-06-03 RX ADMIN — SODIUM CHLORIDE, PRESERVATIVE FREE 10 ML: 5 INJECTION INTRAVENOUS at 08:05

## 2022-06-03 RX ADMIN — OXYCODONE AND ACETAMINOPHEN 1 TABLET: 5; 325 TABLET ORAL at 19:25

## 2022-06-03 ASSESSMENT — PAIN DESCRIPTION - DESCRIPTORS
DESCRIPTORS: BURNING;THROBBING
DESCRIPTORS: BURNING;SHARP
DESCRIPTORS: BURNING;SHARP
DESCRIPTORS: THROBBING
DESCRIPTORS: THROBBING;BURNING
DESCRIPTORS: BURNING

## 2022-06-03 ASSESSMENT — PAIN SCALES - GENERAL
PAINLEVEL_OUTOF10: 8
PAINLEVEL_OUTOF10: 5
PAINLEVEL_OUTOF10: 6
PAINLEVEL_OUTOF10: 9
PAINLEVEL_OUTOF10: 10
PAINLEVEL_OUTOF10: 9
PAINLEVEL_OUTOF10: 10
PAINLEVEL_OUTOF10: 10
PAINLEVEL_OUTOF10: 7
PAINLEVEL_OUTOF10: 8

## 2022-06-03 ASSESSMENT — PAIN DESCRIPTION - ORIENTATION
ORIENTATION: RIGHT

## 2022-06-03 ASSESSMENT — ENCOUNTER SYMPTOMS
SHORTNESS OF BREATH: 0
VOMITING: 0
NAUSEA: 0
SINUS PRESSURE: 0
CHOKING: 0
BACK PAIN: 0
GASTROINTESTINAL NEGATIVE: 1
RESPIRATORY NEGATIVE: 1
ALLERGIC/IMMUNOLOGIC NEGATIVE: 1
COLOR CHANGE: 1
CHEST TIGHTNESS: 0
WHEEZING: 0
ABDOMINAL PAIN: 0
RHINORRHEA: 0
DIARRHEA: 0
COUGH: 0
CONSTIPATION: 0
VOICE CHANGE: 0

## 2022-06-03 ASSESSMENT — PAIN DESCRIPTION - FREQUENCY
FREQUENCY: CONTINUOUS

## 2022-06-03 ASSESSMENT — PAIN - FUNCTIONAL ASSESSMENT
PAIN_FUNCTIONAL_ASSESSMENT: ACTIVITIES ARE NOT PREVENTED
PAIN_FUNCTIONAL_ASSESSMENT: PREVENTS OR INTERFERES SOME ACTIVE ACTIVITIES AND ADLS

## 2022-06-03 ASSESSMENT — PAIN DESCRIPTION - LOCATION
LOCATION: KNEE;HAND
LOCATION: HAND;KNEE
LOCATION: LEG
LOCATION: KNEE
LOCATION: LEG
LOCATION: LEG

## 2022-06-03 ASSESSMENT — PAIN DESCRIPTION - ONSET
ONSET: ON-GOING

## 2022-06-03 ASSESSMENT — PAIN DESCRIPTION - PAIN TYPE
TYPE: ACUTE PAIN

## 2022-06-03 NOTE — PLAN OF CARE
Problem: Discharge Planning  Goal: Discharge to home or other facility with appropriate resources  6/3/2022 1107 by Alison Li RN  Outcome: Progressing  Flowsheets (Taken 6/3/2022 0810)  Discharge to home or other facility with appropriate resources: Refer to discharge planning if patient needs post-hospital services based on physician order or complex needs related to functional status, cognitive ability or social support system  6/3/2022 0012 by Stephen Law RN  Outcome: Progressing     Problem: Pain  Goal: Verbalizes/displays adequate comfort level or baseline comfort level  6/3/2022 1107 by Alison Li RN  Outcome: Progressing  6/3/2022 0012 by Stephen Law RN  Outcome: Progressing  Flowsheets (Taken 6/3/2022 0012)  Verbalizes/displays adequate comfort level or baseline comfort level:   Encourage patient to monitor pain and request assistance   Administer analgesics based on type and severity of pain and evaluate response   Consider cultural and social influences on pain and pain management   Assess pain using appropriate pain scale 

## 2022-06-03 NOTE — PROGRESS NOTES
[facility] Pharmacokinetic Monitoring Service - Vancomycin    Consulting Provider: Yancy Mcbride   Indication: multiple abscesses (right forearm and right knee)  Target Concentration: Goal trough of 10-15 mg/L and AUC/ABEBE <500 mg*hr/L  Day of Therapy: 3  Additional Antimicrobials: none    Pertinent Laboratory Values: Wt Readings from Last 1 Encounters:   06/03/22 171 lb 12.8 oz (77.9 kg)     Temp Readings from Last 1 Encounters:   06/03/22 97.5 °F (36.4 °C) (Oral)     Estimated Creatinine Clearance: 97 mL/min (based on SCr of 1 mg/dL). Recent Labs     06/01/22  1737 06/02/22  0530   CREATININE 1.14 1.00   WBC 12.9* 9.5           MRSA Nasal Swab: N/A. Non-respiratory infection.     Recent vancomycin administrations                     vancomycin 1000 mg IVPB in 250 mL D5W addavial (mg) 1,000 mg New Bag 06/03/22 0548     1,000 mg New Bag 06/02/22 1727    vancomycin (VANCOCIN) 750 mg in dextrose 5 % 250 mL IVPB (mg) 750 mg New Bag 06/02/22 0626    vancomycin (VANCOCIN) 2,000 mg in dextrose 5 % 500 mL IVPB (mg) 2,000 mg New Bag 06/01/22 1841                          Plan:  Current dosing regimen is  1 Gm IV q 12 hours  Continue current dose  Repeat vancomycin concentration ordered for 6/4 @ 1200    Pharmacy will continue to monitor patient and adjust therapy as indicated    Thank you for the consult,  POLO [facility]  6/3/2022 7:18 AM

## 2022-06-03 NOTE — PROGRESS NOTES
GENERAL SURGERY - DR. Armijo [address] [place] [address]  Office: [phone]  GENERAL SURGERY  CONSULT NOTE    PATIENT: Conrad Cha           : 1980  MRN: [id number]  ADMISSION DATE: 2022  5:15 PM   TODAY'S DATE: 22   LOS: 1     ATTENDING PHYSICIAN: Alanna Da Silva MD  CONSULTING PHYSICIAN: Simone Ritter DO    REASON FOR CONSULT:  Cutaneous abscesses of right arm and right leg    HISTORY OF PRESENT ILLNESS:  Patient is a 49-year-old gentleman who presents with furuncles/cutaneous abscesses of the right forearm and right knee. He first developed these bumps about 1 week ago. He believes that these were contracted from the gym where he does jujitsu and wrestling. He was seen on  in the emergency department and was sent home on oral prednisone and permethrin cream but he did not fill the permethrin cream.  The bumps continued to worsen and he was seen again on  by urgent care facility and he was prescribed doxycycline. However they continue to worsen and he was transitioned to Bactrim on . He has noticed some drainage bumps. On evaluation, he is afebrile, hemodynamically normal, saturating greater than 95% on room air, and GCS 15. White count on admission was 12.9 but down trended to 9.5. CRP was elevated to 6.1. He is currently being treated with vancomycin. PAST MEDICAL HISTORY:        Diagnosis Date    ADHD (attention deficit hyperactivity disorder)     Depression        PAST SURGICAL HISTORY:        Procedure Laterality Date    ADENOIDECTOMY AND TYMPANOSTOMY TUBE PLACEMENT      FACIAL COSMETIC SURGERY         ALLERGIES:    Patient has no active allergies.     SOCIAL HISTORY   Social History     Tobacco Use    Smoking status: Never Smoker    Smokeless tobacco: Never Used   Substance Use Topics    Alcohol use: No    Drug use: No        FAMILY HISTORY:       Problem Relation Age of Onset    Rheum Arthritis Mother     No Known Problems Father     No Known Problems Sister     No Known Problems Brother        MEDICATIONS PRIOR TO ADMISSION:   Medications Prior to Admission: permethrin (ELIMITE) 5 % cream, Apply topically as directed  diphenhydrAMINE (BENADRYL) 25 MG capsule, Take 1 capsule by mouth every 6 hours as needed for Itching  amphetamine-dextroamphetamine (ADDERALL XR) 30 MG extended release capsule, Take 1 capsule by mouth daily for 30 days. cloNIDine (CATAPRES) 0.2 MG tablet, Take 1 tablet by mouth 2 times daily  escitalopram (LEXAPRO) 20 MG tablet, Take 1 tablet by mouth daily  [DISCONTINUED] naproxen (NAPROSYN) 500 MG tablet, Take 1 tablet by mouth 2 times daily (with meals) (Patient not taking: Reported on 2022)  [DISCONTINUED] amphetamine-dextroamphetamine (ADDERALL XR) 25 MG extended release capsule, Take 1 capsule by mouth every morning for 30 days. [DISCONTINUED] amphetamine-dextroamphetamine (ADDERALL XR) 20 MG extended release capsule, Take 1 capsule by mouth every morning for 30 days. REVIEW OF SYSTEMS:    Constitutional: Denies weight loss, fever and chills  Head: Denies headache and head trauma  EENT: Denies changes in vision and hearing, Denies rhinorrhea and epistaxis  Mouth/Throat: Denies sore throat and hoarseness. Neck: Denies cervicalgia or neck swelling  CV: Denies palpitations and CP  Resp: Denies SOB and cough  GI: Denies abdominal pain, nausea, vomiting and diarrhea  : Denies dysuria and urinary frequency  MSK: Denies myalgia and joint pain  Neuro: Denies syncope and weakness. Skin: + abscesses, + painful skin bumps  Psych: Denies recent changes in mood.  Denies anxiety and depression    PHYSICAL EXAM:    VITALS  Temp  Av.8 °F (36.6 °C)  Min: 97.3 °F (36.3 °C)  Max: 98.2 °F (25.6 °C)  Systolic (70TYI), FQI:223 , Min:102 , IZU:134   Diastolic (34TMA), HSH:51, Min:52, Max:73  Pulse  Av.5  Min: 62  Max: 73  Resp  Av  Min: 16  Max: 16  SpO2: 97 % SpO2  Av.3 %  Min: 97 % Max: 98 %     General: No acute distress. A&Ox3. Head: NC/AT. EENT: Conjunctiva moist without icterus. Ears are symmetric. Normal auditory acuity. Nares are patent. No rhinorrhea. Mouth/Throat: Oral mucus membranes are moist.  Neck:  Supple. No LAD. Cardiovascular:  Regular rate and rhythm. Warm, well perfused. Respiratory:  Equal chest rise bilaterally. No wheezes, stridor, or increased work of breathing. Abdomen:  Soft, non tender, non distended. No organomegaly. No masses palpated. Neuro: Motor and sensory grossly intact. Extremities: Multiple tender erythematous cutaneous furuncles/abscesses of the right forearm, tender erythematous cutaneous furuncle/abscess of the right lower leg just below the knee. Skin:  No rashes or lesions. Psych: Normal mood and appropriate affect    LABS:  Recent Labs     06/01/22 1737 06/02/22  0530   WBC 12.9* 9.5   HGB 13.9 13.5    247     Recent Labs     06/01/22 1737 06/02/22  0530    139   K 4.4 4.7    105   CO2 24 29   BUN 18 15   CREATININE 1.14 1.00   GLUCOSE 140* 106*     No results for input(s): AST, ALT, ALB, ALKPHOS, BILITOT, BILIDIR in the last 72 hours. No results for input(s): APTT, PROT, INR in the last 72 hours. IMAGING:  CT FEMUR RIGHT W CONTRAST    Result Date: 6/2/2022  1. 5.1 x 1.1 x 2.2 cm somewhat irregular fluid collection over the anterior and anteromedial aspects of the right leg at the level of the proximal tibial metaphysis suspicious for an abscess. Subcutaneous fat stranding of the proximal leg and distal fibula compatible with cellulitis. No soft tissue gas. 2. No acute osseous abnormality. 3. Mild likely reactive right inguinal lymphadenopathy.        ASSESSMENT   Patient Active Problem List   Diagnosis    Generalized anxiety disorder    Moderate episode of recurrent major depressive disorder (HCC)    Attention deficit hyperactivity disorder (ADHD)    Local infection of skin and subcutaneous tissue     41 year old male with right forearm and right knee cutaneous abscesses. PLAN  -Patient seen and evaluated. History, physical exam, laboratory and radiographic findings reviewed and discussed with on-call attending.  -Okay for diet but n.p.o. at midnight. -ID on board. Continue IV antibiotics.  -Will potentially I&D in the a.m.  -Follow-up cultures. -Remainder of care and disposition per primary team.    Nelli Hilton MD  General Surgery PGY4  Available via [facility]    General Surgery Attending Attestation Note    Patient was seen and examined. I agree with the above resident's exam, assessment and plan.      Will obtain CT of the right lower leg, concerning for knee joint involvement due to pt report of painful flexion  Will need incision and drainage of right upper extremity abscesses    Maria L Rainey,  [facility], [facility] Katlyn Ybarra, [facility] [place], [address]  Office: [phone]  After Hours: Please call answering service

## 2022-06-03 NOTE — CONSULTS
[facility]  Marjorie Ruiz, Elyssa Summers,[address]  Office: [phone]        [facility]  GENERAL SURGERY  CONSULT NOTE     PATIENT: Dianna Zacarias                                            : 1980  MRN: [id number]  ADMISSION DATE: 2022  5:15 PM   TODAY'S DATE: 22   LOS: 1      ATTENDING PHYSICIAN: Marina Chau MD  CONSULTING PHYSICIAN: Pineda Watson DO     REASON FOR CONSULT:  Cutaneous abscesses of right arm and right leg     HISTORY OF PRESENT ILLNESS:  Patient is a 75-year-old gentleman who presents with furuncles/cutaneous abscesses of the right forearm and right knee. He first developed these bumps about 1 week ago. He believes that these were contracted from the gym where he does jujitsu and wrestling. He was seen on  in the emergency department and was sent home on oral prednisone and permethrin cream but he did not fill the permethrin cream.  The bumps continued to worsen and he was seen again on  by urgent care facility and he was prescribed doxycycline. However they continue to worsen and he was transitioned to Bactrim on . He has noticed some drainage bumps. On evaluation, he is afebrile, hemodynamically normal, saturating greater than 95% on room air, and GCS 15. White count on admission was 12.9 but down trended to 9.5. CRP was elevated to 6.1.   He is currently being treated with vancomycin.     PAST MEDICAL HISTORY:    Past Medical History             Diagnosis Date    ADHD (attention deficit hyperactivity disorder)      Depression              PAST SURGICAL HISTORY:    Past Surgical History             Procedure Laterality Date    ADENOIDECTOMY AND TYMPANOSTOMY TUBE PLACEMENT        FACIAL COSMETIC SURGERY                ALLERGIES:    Patient has no active allergies.     SOCIAL HISTORY   Social History           Tobacco Use    Smoking status: Never Smoker    Smokeless tobacco: Never Used   Substance Use Topics    Alcohol use: No    Drug use: No         FAMILY HISTORY:   Family History             Problem Relation Age of Onset    Rheum Arthritis Mother      No Known Problems Father      No Known Problems Sister      No Known Problems Brother              MEDICATIONS PRIOR TO ADMISSION:     Prescriptions Prior to Admission   Medications Prior to Admission: permethrin (ELIMITE) 5 % cream, Apply topically as directed  diphenhydrAMINE (BENADRYL) 25 MG capsule, Take 1 capsule by mouth every 6 hours as needed for Itching  amphetamine-dextroamphetamine (ADDERALL XR) 30 MG extended release capsule, Take 1 capsule by mouth daily for 30 days. cloNIDine (CATAPRES) 0.2 MG tablet, Take 1 tablet by mouth 2 times daily  escitalopram (LEXAPRO) 20 MG tablet, Take 1 tablet by mouth daily  [DISCONTINUED] naproxen (NAPROSYN) 500 MG tablet, Take 1 tablet by mouth 2 times daily (with meals) (Patient not taking: Reported on 2022)  [DISCONTINUED] amphetamine-dextroamphetamine (ADDERALL XR) 25 MG extended release capsule, Take 1 capsule by mouth every morning for 30 days. [DISCONTINUED] amphetamine-dextroamphetamine (ADDERALL XR) 20 MG extended release capsule, Take 1 capsule by mouth every morning for 30 days.        REVIEW OF SYSTEMS:    Constitutional: Denies weight loss, fever and chills  Head: Denies headache and head trauma  EENT: Denies changes in vision and hearing, Denies rhinorrhea and epistaxis  Mouth/Throat: Denies sore throat and hoarseness. Neck: Denies cervicalgia or neck swelling  CV: Denies palpitations and CP  Resp: Denies SOB and cough  GI: Denies abdominal pain, nausea, vomiting and diarrhea  : Denies dysuria and urinary frequency  MSK: Denies myalgia and joint pain  Neuro: Denies syncope and weakness. Skin: + abscesses, + painful skin bumps  Psych: Denies recent changes in mood.  Denies anxiety and depression     PHYSICAL EXAM:    VITALS  Temp  Av.8 °F (36.6 °C)  Min: 97.3 °F (36.3 °C)  Max: 98.2 °F (45.6 °C)  Systolic (24hrs), Av , Min:102 , SFX:710   Diastolic (62HRQ), LQH:06, Min:52, Max:73  Pulse  Av.5  Min: 62  Max: 73  Resp  Av  Min: 16  Max: 16  SpO2: 97 % SpO2  Av.3 %  Min: 97 %  Max: 98 %      General: No acute distress. A&Ox3. Head: NC/AT. EENT: Conjunctiva moist without icterus. Ears are symmetric. Normal auditory acuity. Nares are patent. No rhinorrhea. Mouth/Throat: Oral mucus membranes are moist.  Neck:  Supple. No LAD. Cardiovascular:  Regular rate and rhythm. Warm, well perfused. Respiratory:  Equal chest rise bilaterally. No wheezes, stridor, or increased work of breathing. Abdomen:  Soft, non tender, non distended. No organomegaly. No masses palpated. Neuro: Motor and sensory grossly intact. Extremities: Multiple tender erythematous cutaneous furuncles/abscesses of the right forearm, tender erythematous cutaneous furuncle/abscess of the right lower leg just below the knee. Skin:  No rashes or lesions. Psych: Normal mood and appropriate affect     LABS:       Recent Labs     22  1737 22  0530   WBC 12.9* 9.5   HGB 13.9 13.5    247           Recent Labs     22  1737 22  0530    139   K 4.4 4.7    105   CO2 24 29   BUN 18 15   CREATININE 1.14 1.00   GLUCOSE 140* 106*      No results for input(s): AST, ALT, ALB, ALKPHOS, BILITOT, BILIDIR in the last 72 hours. No results for input(s): APTT, PROT, INR in the last 72 hours.     IMAGING:  CT FEMUR RIGHT W CONTRAST     Result Date: 2022  1. 5.1 x 1.1 x 2.2 cm somewhat irregular fluid collection over the anterior and anteromedial aspects of the right leg at the level of the proximal tibial metaphysis suspicious for an abscess. Subcutaneous fat stranding of the proximal leg and distal fibula compatible with cellulitis. No soft tissue gas. 2. No acute osseous abnormality.  3. Mild likely reactive right inguinal lymphadenopathy.         ASSESSMENT       Patient Active Problem List   Diagnosis  Generalized anxiety disorder    Moderate episode of recurrent major depressive disorder (HCC)    Attention deficit hyperactivity disorder (ADHD)    Local infection of skin and subcutaneous tissue      39year old male with right forearm and right knee cutaneous abscesses.      PLAN  -Patient seen and evaluated. History, physical exam, laboratory and radiographic findings reviewed and discussed with on-call attending.  -Okay for diet but n.p.o. at midnight. -ID on board. Continue IV antibiotics.  -Will potentially I&D in the a.m.  -Follow-up cultures. -Remainder of care and disposition per primary team.  Demetrio Buchanan MD  General Surgery PGY4  Available via [facility]     General Surgery Attending Attestation Note     Patient was seen and examined.  I agree with the above resident's exam, assessment and plan.      Will obtain CT of the right lower leg, concerning for knee joint involvement due to pt report of painful flexion  Will need incision and drainage of right upper extremity abscesses     Gala Starks DO [facility], [address] [place] [address], Psychiatric, [address]  Office: [phone]  After Hours: Please call answering service

## 2022-06-03 NOTE — PROGRESS NOTES
[facility]  Progress Note    Johnathan Thompson  MRN: [id number]  Date: 6/3/2022  LOS: 2     Reason for F/U :   Multiple skin and soft tissue abscesses with associated cellulitis    Impression :   1. Multiple right upper extremity/forearm abscesses with associated cellulitis  2. Right knee soft tissue abscess  · Cultures with MRSA    Recommendations:   · The patient has undergone surgical I&D. · The culture has grown out MRSA. · Continue intravenous antimicrobial therapy with vancomycin. · The patient will likely be discharged on oral antimicrobial therapy with Bactrim and follow-up with me in the outpatient setting to ensure resolution of the infection. Infection Control Recommendations:   Contact precautions    Discharge Planning:   Estimated Length of IV antimicrobials: While hospitalized  Patient will need Midline Catheter Insertion/ PICC line Insertion: No  Patient will need: Home IV , [place],  SNF,  LTAC: Undetermined  Patient willneed outpatient wound care: No    Medical Decision making / Summary of Stay:   Johnathan Thompson is a 39y.o.-year-old male who was initially admitted on 6/1/2022. Dorinda Washington has no significant past medical history and he tells me that he recently started taekwondo and has been participating in [employer] About 2 weeks ago he reports he started to develop lesions on his skin in the right arm as well as at the right knee. On Wednesday about a week ago he ended up coming here was diagnosed with a rash started on prednisone and sent home but by Friday he was getting worsening lesions and he ended up going to an urgent care on [address] where he was started on doxycycline for what was presumed to be a staphylococcal skin infection. The patient reports that the skin lesions were worsening over the weekend and on Monday he went to an urgent care and remains well with the doxycycline was switched to Bactrim.   The patient has had continued worsening soft tissue changes with increasing pain and pustular like changes and he came back into the emergency department and has since been admitted started on IV antimicrobial therapy with vancomycin. The patient did have some purulent material expressed and so far the culture has grown out Staph aureus. I was asked to evaluate and help with antibiotic choice. Current evaluation:6/3/2022    /72   Pulse 70   Temp 98.1 °F (36.7 °C) (Oral)   Resp 17   Ht 5' 9\" (1.753 m)   Wt 171 lb 12.8 oz (77.9 kg)   SpO2 97%   BMI 25.37 kg/m²     Temperature Range: Temp: 98.1 °F (36.7 °C) Temp  Av.9 °F (36.6 °C)  Min: 97.5 °F (36.4 °C)  Max: 98.2 °F (36.8 °C)  The patient is seen and evaluated at bedside and is awake and alert in no acute distress. No subjective fever or chills. The pain in the forearm and knee have improved significantly. It is my understanding that the soft tissue abscesses had enlarged overnight. The patient was seen by the general surgery team underwent I&D of multiple abscesses. Review of Systems   Constitutional: Negative. Respiratory: Negative. Cardiovascular: Negative. Gastrointestinal: Negative. Genitourinary: Negative. Musculoskeletal: Negative. Skin: Positive for wound. Allergic/Immunologic: Negative. Neurological: Negative. Physical Examination :     Physical Exam  Constitutional:       Appearance: He is well-developed. HENT:      Head: Normocephalic and atraumatic. Cardiovascular:      Rate and Rhythm: Normal rate. Heart sounds: Normal heart sounds. No friction rub. No gallop. Pulmonary:      Effort: Pulmonary effort is normal.      Breath sounds: Normal breath sounds. No wheezing. Abdominal:      General: Bowel sounds are normal.      Palpations: Abdomen is soft. There is no mass. Tenderness: There is no abdominal tenderness. Musculoskeletal:         General: Normal range of motion. Cervical back: Normal range of motion and neck supple. GROWTH Abnormal      No anaerobic organisms isolated at 2 days. Methicillin-Resistant Staphylococcus aureus (1)    Antibiotic Interpretation Microscan Method Status    penicillin Resistant >=0.5 BACTERIAL SUSCEPTIBILITY PANEL ABEBE Preliminary    clindamycin Resistant >=8 BACTERIAL SUSCEPTIBILITY PANEL ABEBE Preliminary    erythromycin Resistant >=8 BACTERIAL SUSCEPTIBILITY PANEL ABEBE Preliminary    gentamicin Sensitive <=0.5 BACTERIAL SUSCEPTIBILITY PANEL ABEBE Preliminary     Gentamicin is used only in combination with other active agents that test susceptible. levofloxacin Sensitive 0.25 BACTERIAL SUSCEPTIBILITY PANEL ABEBE Preliminary    oxacillin Resistant >=4 BACTERIAL SUSCEPTIBILITY PANEL ABEBE Preliminary    tetracycline Resistant >=16 BACTERIAL SUSCEPTIBILITY PANEL ABEBE Preliminary    trimethoprim-sulfamethoxazole Sensitive <=10 BACTERIAL SUSCEPTIBILITY PANEL ABEBE Preliminary    vancomycin Sensitive 1 BACTERIAL SUSCEPTIBILITY PANEL ABEBE Preliminary          Specimen Collected: 06/01/22 17:35 Last Resulted: 06/03/22 14:28          Culture, Blood 1 [[id number]] (Abnormal) Collected: 06/01/22 4507   Order Status: Completed Specimen: Blood Updated: 06/03/22 1118    Specimen Description . BLOOD    Special Requests LT AC,12ML    Culture POSITIVE Blood Culture Abnormal      DIRECT GRAM STAIN FROM BOTTLE: GRAM POSITIVE COCCI IN CLUSTERS     Staphylococcus epidermidis Detected: mecA/C Gene Detected- Methicillin Resistant Organism Methodology- Polymerase Chain Reaction (PCR)     STAPHYLOCOCCUS EPIDERMIDIS A single positive blood culture of coagulase negative Staphylocci, diphtheroids,micrococci, Cutibacterium, viridans Streptocci, Bacillus, or Lactobacillus species should be interpreted with caution and viewed as a likely skin contaminant. Abnormal      (NOTE) Direct Gram Stain from bottle and Polymerase Chain Reaction (PCR) results called to and read back by: FERNANDA MARCELINO 6/2/2022 1620     Medications:     Sylwia Lobe

## 2022-06-03 NOTE — PROGRESS NOTES
General Surgery:  Daily Progress Note            PATIENT NAME: El Arias     TODAY'S DATE: 6/3/2022, 6:33 AM    SUBJECTIVE:     Pt seen and examined at bedside. No acute events overnight. Pain controlled. No other complaints      R.O.S.   No fevers or chills  No vision or hearing changes  No chest pain or palpitations  No abdominal pain, nausea or vomiting  Multiple skin lesions    OBJECTIVE:   VITALS:  BP (!) 127/54   Pulse 68   Temp 97.5 °F (36.4 °C) (Oral)   Resp 16   Ht 5' 9\" (1.753 m)   Wt 171 lb 12.8 oz (77.9 kg)   SpO2 97%   BMI 25.37 kg/m²      INTAKE/OUTPUT:      Intake/Output Summary (Last 24 hours) at 6/3/2022 8984  Last data filed at 6/3/2022 0548  Gross per 24 hour   Intake 2961.03 ml   Output 2325 ml   Net 636.03 ml       PHYSICAL EXAM:  General Appearance:  awake, alert, oriented, in no acute distress  Skin:  Abscess on right forearm and right leg, non draining, fluctuant, tender  Lungs:  No inc WOB  Heart:  RRR  Abdomen: soft, ND, NTTP    Data:  CBC with Differential:    Lab Results   Component Value Date    WBC 9.5 06/02/2022    RBC 4.47 06/02/2022    HGB 13.5 06/02/2022    HCT 42.1 06/02/2022     06/02/2022    MCV 94.2 06/02/2022    MCH 30.2 06/02/2022    MCHC 32.1 06/02/2022    RDW 13.0 06/02/2022    LYMPHOPCT 26 06/02/2022    MONOPCT 13 06/02/2022    BASOPCT 1 06/02/2022    MONOSABS 1.18 06/02/2022    LYMPHSABS 2.50 06/02/2022    EOSABS 0.34 06/02/2022    BASOSABS 0.08 06/02/2022     BMP:    Lab Results   Component Value Date     06/02/2022    K 4.7 06/02/2022     06/02/2022    CO2 29 06/02/2022    BUN 15 06/02/2022    CREATININE 1.00 06/02/2022    CALCIUM 8.2 06/02/2022    GFRAA >60 06/02/2022    LABGLOM >60 06/02/2022    GLUCOSE 106 06/02/2022       Radiology Review:  No new    ASSESSMENT:  Active Hospital Problems    Diagnosis Date Noted    Local infection of skin and subcutaneous tissue [L08.9] 06/01/2022     Priority: Medium    Attention deficit hyperactivity disorder (ADHD) [F90.9] 11/11/2021    Generalized anxiety disorder [F41.1] 09/03/2021       39 y.o. male with abscess to right leg and arm  Plan:  1. Plan for bedside I&D this morning. 2. Continue antibiotics. Local wound care. [id number] [place] [employer] for diet. Electronically signed by Francis Hurtado DO  on 6/3/2022 at 6:33 AM    Attending Physician Statement  I have discussed the case, including pertinent history and exam findings with the resident. I agree with the assessment, plan and orders as documented by the resident. Patient seen and examined. Agree with above. Bedside sharp incision and drainage this am.  Wound cultures.   Local wound care

## 2022-06-03 NOTE — PROGRESS NOTES
[facility]        NAME:  [id number] [facility] RECORD NUMBER:  [id number]  AGE: 39 y.o. GENDER: male  : 1980  TODAY'S DATE:  6/3/2022    [facility] and Home nursing consult noted. General surgery service on case and managing wound care needs at this time. Planned I&D today. Will defer eval and treat and sign off case. Please call or reconsult if further needs arise. Thank you.     Chaparrita RIDDLE NP-C, CWS, [facility] Jade Velasquez [place] [address]  Wound, Ostomy, and Continence Nursing  [phone]

## 2022-06-03 NOTE — PLAN OF CARE
Problem: Discharge Planning  Goal: Discharge to home or other facility with appropriate resources  Outcome: Progressing     Problem: Pain  Goal: Verbalizes/displays adequate comfort level or baseline comfort level  Outcome: Progressing  Flowsheets (Taken 6/3/2022 0012)  Verbalizes/displays adequate comfort level or baseline comfort level:   Encourage patient to monitor pain and request assistance   Administer analgesics based on type and severity of pain and evaluate response   Consider cultural and social influences on pain and pain management   Assess pain using appropriate pain scale

## 2022-06-03 NOTE — PROGRESS NOTES
Transitions of Care Pharmacy Service   Medication Review    The patient's list of current home medications has been reviewed. Source(s) of information: patient, OARRS, Nanette, Bindu      Please feel free to call me with any questions about this encounter. Thank you. Basil Field [facility]   Transitions of Care Pharmacy Service  Phone:  [phone]  Fax: [phone]      Electronically signed by Basil Field [facility] on 6/3/2022 at 6:19 PM           Medications Prior to Admission:   doxycycline hyclate (VIBRAMYCIN) 100 mg capsule, Take 100 mg by mouth 2 times daily 7-day course prescribed 5/27/22  permethrin (ELIMITE) 5 % cream, Apply topically as directed  diphenhydrAMINE (BENADRYL) 25 MG capsule, Take 1 capsule by mouth every 6 hours as needed for Itching  amphetamine-dextroamphetamine (ADDERALL XR) 30 MG extended release capsule, Take 1 capsule by mouth daily for 30 days.   cloNIDine (CATAPRES) 0.2 MG tablet, Take 1 tablet by mouth 2 times daily  escitalopram (LEXAPRO) 20 MG tablet, Take 1 tablet by mouth daily

## 2022-06-03 NOTE — PROGRESS NOTES
[facility]  Office: [address], DO, Welshrachel Sampson, DO, Felipa Rodgers, DO, Stepan Ng Blood, DO, Roly Gallagher MD, Britni Olmedo MD, Janes Landeros MD, George Godwin MD, Tony Aguiar MD, Viral Green MD, Charleen Lala MD, Gene Tatum DO, Terese Rawls MD,  Tere Hogan DO, Alex Rivera MD, Allison Franz MD, Ck Redd MD, Ruben Marin DO, Hiram Mcleod MD, Tom Zhong MD, Norm Marrero DO, Claudeen Hook, MD, Tony Dawkins, [facility], [facility], CNP, Cassie Flores, CNP, Joaquin Lucero, CNP, Shad Ayala, CNP, Rupesh Pradhan, CNP, Janes Grove, PA-C, Seda Wakefield, [facility], Kathy Salazar, CNP, Toño Magana, CNP, Phi Ovalles, CNP, Marylene Bucy, CNS, Zac Perez, [facility], Isidra Gross, CNP, July Saunders, [facility], [facility]      Daily Progress Note     Admit Date: 6/1/2022  Bed/Room No.  2004/2004-02  Admitting Physician : Janes Landeros MD  Code Status :Full Code  Hospital Day:  LOS: 2 days   Chief Complaint:     Chief Complaint   Patient presents with    Wound Infection     Staph infection to right arm; started on ATB but pt reports not working     Principal Problem:    Local infection of skin and subcutaneous tissue  Active Problems:    Generalized anxiety disorder    Attention deficit hyperactivity disorder (ADHD)  Resolved Problems:    * No resolved hospital problems. *    Subjective : Interval History/Significant events :  06/03/22    Patient underwent bedside I&D today for right forearm and right knee abscess. Patient denies any fever, chills, nausea, vomiting. Abscess culture positive for MRSA. He is on vancomycin. Vitals - Stable afebrile  Labs -leukocytosis resolved, staph aureus light growth. Nursing notes , Consults notes reviewed. Overnight events and updates discussed with Nursing staff .    Background History:         Doug Pugh is 39 y.o. male  Who was admitted to the hospital on 6/1/2022 for treatment of Local infection of skin and subcutaneous tissue. Patient came to emergency room with right forearm, right knee infection. 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 He had tried baby powder at home but did not help. Patient also plays Retidocling mat reported increased sweating that does not help his wounds. Patient was seen recently in emergency room 1 week before for redness and was discharged on oral prednisone and permethrin cream that did not help. He went to urgent care about 5 days before and was given oral doxycycline without any improvement in symptoms. Patient reported drainage from his wound he denies any fevers, chills. Patient has underlying history of ADHD, depression and is on clonidine, Adderall, Lexapro. PMH:  Past Medical History:   Diagnosis Date    ADHD (attention deficit hyperactivity disorder)     Depression       Allergies: No Active Allergies   Medications :  lidocaine-EPINEPHrine, 40 mL, IntraDERmal, Once  vancomycin, 1,000 mg, IntraVENous, Q12H  amphetamine-dextroamphetamine, 30 mg, Oral, Daily  cloNIDine, 0.2 mg, Oral, BID  escitalopram, 20 mg, Oral, Daily  sodium chloride flush, 5-40 mL, IntraVENous, 2 times per day  enoxaparin, 40 mg, SubCUTAneous, Daily  vancomycin (VANCOCIN) intermittent dosing (placeholder), , Other, RX Placeholder        Review of Systems   Review of Systems   Constitutional: Negative for activity change, appetite change, fatigue, fever and unexpected weight change. HENT: Negative for congestion, nosebleeds, rhinorrhea, sinus pressure, sneezing and voice change. Respiratory: Negative for cough, choking, chest tightness, shortness of breath and wheezing. Cardiovascular: Negative for chest pain, palpitations and leg swelling. Gastrointestinal: Negative for abdominal pain, constipation, diarrhea, nausea and vomiting.    Genitourinary: Negative for difficulty urinating, dysuria, frequency, penile discharge and testicular pain. Musculoskeletal: Negative for back pain. Skin: Positive for color change and wound. Negative for rash. Neurological: Negative for dizziness, weakness, light-headedness and headaches. Hematological: Does not bruise/bleed easily. Psychiatric/Behavioral: Negative for agitation, behavioral problems, confusion, self-injury, sleep disturbance and suicidal ideas. Objective :      Current Vitals : Temp: 97.5 °F (36.4 °C),  Heart Rate: 68, Resp: 17, BP: 124/65, SpO2: 97 %  Last 24 Hrs Vitals   Patient Vitals for the past 24 hrs:   BP Temp Temp src Pulse Resp SpO2 Weight   06/03/22 0726 124/65 97.5 °F (36.4 °C) Oral 68 17 97 % --   06/03/22 0546 -- -- -- -- 16 -- --   06/03/22 0352 -- -- -- -- -- -- 171 lb 12.8 oz (77.9 kg)   06/03/22 0339 (!) 127/54 97.5 °F (36.4 °C) Oral 68 18 97 % --   06/03/22 0211 -- -- -- -- 16 -- --   06/03/22 0141 -- -- -- -- 16 -- --   06/02/22 2311 108/62 97.9 °F (36.6 °C) Oral 70 16 99 % --   06/02/22 2033 -- -- -- -- 16 -- --   06/02/22 1923 126/65 98.1 °F (36.7 °C) Oral 73 16 97 % --   06/02/22 1537 110/73 98.2 °F (36.8 °C) Oral 62 16 97 % --   06/02/22 1129 (!) 102/52 97.4 °F (36.3 °C) Oral 72 16 98 % --     Intake / output   06/01 1901 - 06/03 0700  In: 2961 [I.V.:2425.9]  Out: 2325 [Urine:2325]  Physical Exam:  Physical Exam  Vitals and nursing note reviewed. Constitutional:       General: He is not in acute distress. Appearance: He is not diaphoretic. HENT:      Head: Normocephalic and atraumatic. Nose:      Right Sinus: No maxillary sinus tenderness or frontal sinus tenderness. Left Sinus: No maxillary sinus tenderness or frontal sinus tenderness. Mouth/Throat:      Pharynx: No oropharyngeal exudate. Eyes:      General: No scleral icterus. Conjunctiva/sclera: Conjunctivae normal.      Pupils: Pupils are equal, round, and reactive to light. Neck:      Thyroid: No thyromegaly. Vascular: No JVD. Cardiovascular:      Rate and Rhythm: Normal rate and regular rhythm. Pulses:           Dorsalis pedis pulses are 2+ on the right side and 2+ on the left side. Heart sounds: Normal heart sounds. No murmur heard. Pulmonary:      Effort: Pulmonary effort is normal.      Breath sounds: Normal breath sounds. No wheezing or rales. Abdominal:      Palpations: Abdomen is soft. There is no mass. Tenderness: There is no abdominal tenderness. Musculoskeletal:      Cervical back: Full passive range of motion without pain and neck supple. Comments: Right forearm dressing in place at I&D site. Lymphadenopathy:      Head:      Right side of head: No submandibular adenopathy. Left side of head: No submandibular adenopathy. Cervical: No cervical adenopathy. Skin:     General: Skin is warm. Comments: Right forearm dressing in place at I&D site. Right leg dressing at knee at I&D site. Neurological:      Mental Status: He is alert and oriented to person, place, and time. Motor: No tremor. Psychiatric:         Behavior: Behavior is cooperative. Laboratory findings:    Recent Labs     06/01/22  1737 06/02/22  0530   WBC 12.9* 9.5   HGB 13.9 13.5   HCT 41.8 42.1    247   SEDRATE 22*  --      Recent Labs     06/01/22  1737 06/02/22  0530    139   K 4.4 4.7    105   CO2 24 29   GLUCOSE 140* 106*   BUN 18 15   CREATININE 1.14 1.00   CALCIUM 8.5* 8.2*     No results for input(s): PROT, LABALBU, LABA1C, N7YPEMZ, P2HBVXX, FT4, TSH, AST, ALT, LDH, GGT, ALKPHOS, BILITOT, BILIDIR, AMMONIA, AMYLASE, LIPASE, LACTATE, CHOL, HDL, LDLCHOLESTEROL, CHOLHDLRATIO, TRIG, VLDL, BNP, TROPONINI, CKTOTAL, CKMB, CKMBINDEX, RF, TIFFANY in the last 72 hours. No results found for: [facility], [address] [facility], BLOODU, BACTERIA, NITRU, [address], LEUKOCYTESUR    Imaging / Clinical Data :-   No results found.      Clinical Course : stable  Assessment and Plan  : 1. Right forearm and right leg cellulitis with abscess-continue IV vancomycin. Culture showing MRSA. Underwent I&D. Follow C/S   2. Depression - stable   3. ADHD - on adderall         Continue to monitor vitals , Intake / output ,  Cell count , HGB , Kidney function, oxygenation  as indicated . Plan and updates discussed with patient ,  answers  explained to satisfaction.    Plan discussed with Staff Andreia MICHAEL     (Please note that portions of this note were completed with a voice recognition program. Efforts were made to edit the dictations but occasionally words are mis-transcribed.)      Manjula Vallecillo MD  6/3/2022

## 2022-06-04 VITALS
TEMPERATURE: 98.1 F | HEART RATE: 69 BPM | DIASTOLIC BLOOD PRESSURE: 65 MMHG | WEIGHT: 171.8 LBS | RESPIRATION RATE: 16 BRPM | OXYGEN SATURATION: 98 % | BODY MASS INDEX: 25.45 KG/M2 | SYSTOLIC BLOOD PRESSURE: 109 MMHG | HEIGHT: 69 IN

## 2022-06-04 LAB
BUN BLDV-MCNC: 14 MG/DL (ref 6–20)
CREAT SERPL-MCNC: 0.79 MG/DL (ref 0.7–1.2)
GFR AFRICAN AMERICAN: >60 ML/MIN
GFR NON-AFRICAN AMERICAN: >60 ML/MIN
GFR SERPL CREATININE-BSD FRML MDRD: NORMAL ML/MIN/{1.73_M2}
VANCOMYCIN RANDOM: 11.2 UG/ML

## 2022-06-04 PROCEDURE — 6370000000 HC RX 637 (ALT 250 FOR IP): Performed by: FAMILY MEDICINE

## 2022-06-04 PROCEDURE — 99232 SBSQ HOSP IP/OBS MODERATE 35: CPT | Performed by: INTERNAL MEDICINE

## 2022-06-04 PROCEDURE — 6360000002 HC RX W HCPCS: Performed by: STUDENT IN AN ORGANIZED HEALTH CARE EDUCATION/TRAINING PROGRAM

## 2022-06-04 PROCEDURE — 2580000003 HC RX 258: Performed by: NURSE PRACTITIONER

## 2022-06-04 PROCEDURE — 80202 ASSAY OF VANCOMYCIN: CPT

## 2022-06-04 PROCEDURE — 6360000002 HC RX W HCPCS: Performed by: NURSE PRACTITIONER

## 2022-06-04 PROCEDURE — 99238 HOSP IP/OBS DSCHRG MGMT 30/<: CPT | Performed by: FAMILY MEDICINE

## 2022-06-04 PROCEDURE — 6370000000 HC RX 637 (ALT 250 FOR IP): Performed by: NURSE PRACTITIONER

## 2022-06-04 PROCEDURE — 36415 COLL VENOUS BLD VENIPUNCTURE: CPT

## 2022-06-04 PROCEDURE — 84520 ASSAY OF UREA NITROGEN: CPT

## 2022-06-04 PROCEDURE — 82565 ASSAY OF CREATININE: CPT

## 2022-06-04 RX ORDER — DOCUSATE SODIUM 100 MG/1
100 CAPSULE, LIQUID FILLED ORAL 2 TIMES DAILY
Qty: 60 CAPSULE | Refills: 0 | Status: SHIPPED | OUTPATIENT
Start: 2022-06-04 | End: 2022-07-04

## 2022-06-04 RX ORDER — OXYCODONE HYDROCHLORIDE AND ACETAMINOPHEN 5; 325 MG/1; MG/1
1 TABLET ORAL EVERY 6 HOURS PRN
Qty: 12 TABLET | Refills: 0 | Status: SHIPPED | OUTPATIENT
Start: 2022-06-04 | End: 2022-06-07

## 2022-06-04 RX ORDER — MORPHINE SULFATE 2 MG/ML
2 INJECTION, SOLUTION INTRAMUSCULAR; INTRAVENOUS ONCE
Status: COMPLETED | OUTPATIENT
Start: 2022-06-04 | End: 2022-06-04

## 2022-06-04 RX ORDER — SULFAMETHOXAZOLE AND TRIMETHOPRIM 800; 160 MG/1; MG/1
1 TABLET ORAL 2 TIMES DAILY
Qty: 14 TABLET | Refills: 0 | Status: SHIPPED | OUTPATIENT
Start: 2022-06-04 | End: 2022-06-20 | Stop reason: SDUPTHER

## 2022-06-04 RX ADMIN — OXYCODONE AND ACETAMINOPHEN 1 TABLET: 5; 325 TABLET ORAL at 04:14

## 2022-06-04 RX ADMIN — ESCITALOPRAM OXALATE 20 MG: 10 TABLET ORAL at 09:06

## 2022-06-04 RX ADMIN — OXYCODONE AND ACETAMINOPHEN 1 TABLET: 5; 325 TABLET ORAL at 00:13

## 2022-06-04 RX ADMIN — MORPHINE SULFATE 2 MG: 2 INJECTION, SOLUTION INTRAMUSCULAR; INTRAVENOUS at 05:39

## 2022-06-04 RX ADMIN — OXYCODONE AND ACETAMINOPHEN 1 TABLET: 5; 325 TABLET ORAL at 09:06

## 2022-06-04 RX ADMIN — SODIUM CHLORIDE: 9 INJECTION, SOLUTION INTRAVENOUS at 02:51

## 2022-06-04 RX ADMIN — VANCOMYCIN HYDROCHLORIDE 1000 MG: 1 INJECTION, POWDER, LYOPHILIZED, FOR SOLUTION INTRAVENOUS at 06:18

## 2022-06-04 ASSESSMENT — ENCOUNTER SYMPTOMS
VOMITING: 0
CHEST TIGHTNESS: 0
ABDOMINAL PAIN: 0
CHOKING: 0
COLOR CHANGE: 1
WHEEZING: 0
BACK PAIN: 0
VOICE CHANGE: 0
SINUS PRESSURE: 0
ALLERGIC/IMMUNOLOGIC NEGATIVE: 1
COUGH: 0
NAUSEA: 0
RHINORRHEA: 0
DIARRHEA: 0
GASTROINTESTINAL NEGATIVE: 1
SHORTNESS OF BREATH: 0
CONSTIPATION: 0
RESPIRATORY NEGATIVE: 1

## 2022-06-04 ASSESSMENT — PAIN DESCRIPTION - ONSET
ONSET: ON-GOING

## 2022-06-04 ASSESSMENT — PAIN SCALES - GENERAL
PAINLEVEL_OUTOF10: 8
PAINLEVEL_OUTOF10: 9
PAINLEVEL_OUTOF10: 9
PAINLEVEL_OUTOF10: 8

## 2022-06-04 ASSESSMENT — PAIN DESCRIPTION - DESCRIPTORS
DESCRIPTORS: BURNING;THROBBING

## 2022-06-04 ASSESSMENT — PAIN - FUNCTIONAL ASSESSMENT
PAIN_FUNCTIONAL_ASSESSMENT: ACTIVITIES ARE NOT PREVENTED

## 2022-06-04 ASSESSMENT — PAIN DESCRIPTION - ORIENTATION
ORIENTATION: RIGHT

## 2022-06-04 ASSESSMENT — PAIN DESCRIPTION - FREQUENCY
FREQUENCY: CONTINUOUS

## 2022-06-04 ASSESSMENT — PAIN DESCRIPTION - LOCATION
LOCATION: HAND;KNEE

## 2022-06-04 ASSESSMENT — PAIN DESCRIPTION - PAIN TYPE
TYPE: ACUTE PAIN

## 2022-06-04 NOTE — PROGRESS NOTES
General Surgery:  Daily Progress Note            PATIENT NAME: Melvin Nj     TODAY'S DATE: 6/4/2022, 7:28 AM    SUBJECTIVE:     Pt seen and examined at bedside. No acute events overnight. Pain controlled. No other complaints. R.O.S.   No fevers or chills  No vision or hearing changes  No chest pain or palpitations  No abdominal pain, nausea or vomiting  Multiple skin lesions    OBJECTIVE:   VITALS:  /65   Pulse 69   Temp 98.1 °F (36.7 °C) (Oral)   Resp 16   Ht 5' 9\" (1.753 m)   Wt 171 lb 12.8 oz (77.9 kg)   SpO2 98%   BMI 25.37 kg/m²      INTAKE/OUTPUT:      Intake/Output Summary (Last 24 hours) at 6/4/2022 0728  Last data filed at 6/4/2022 2675  Gross per 24 hour   Intake 2219.49 ml   Output 2415 ml   Net -195.51 ml       PHYSICAL EXAM:  General Appearance:  awake, alert, oriented, in no acute distress  Skin:  Abscess on right forearm and right leg opened yesterday, packing changed, erythema improved, minimal drainage  Lungs:  No inc WOB  Heart:  RRR  Abdomen: soft, ND, NTTP    Data:  CBC with Differential:    Lab Results   Component Value Date    WBC 9.5 06/02/2022    RBC 4.47 06/02/2022    HGB 13.5 06/02/2022    HCT 42.1 06/02/2022     06/02/2022    MCV 94.2 06/02/2022    MCH 30.2 06/02/2022    MCHC 32.1 06/02/2022    RDW 13.0 06/02/2022    LYMPHOPCT 26 06/02/2022    MONOPCT 13 06/02/2022    BASOPCT 1 06/02/2022    MONOSABS 1.18 06/02/2022    LYMPHSABS 2.50 06/02/2022    EOSABS 0.34 06/02/2022    BASOSABS 0.08 06/02/2022     BMP:    Lab Results   Component Value Date     06/02/2022    K 4.7 06/02/2022     06/02/2022    CO2 29 06/02/2022    BUN 15 06/02/2022    CREATININE 1.00 06/02/2022    CALCIUM 8.2 06/02/2022    GFRAA >60 06/02/2022    LABGLOM >60 06/02/2022    GLUCOSE 106 06/02/2022       Radiology Review:  No new    ASSESSMENT:  Active Hospital Problems    Diagnosis Date Noted    Skin pustule [L08.9] 06/01/2022     Priority: Medium    Attention deficit hyperactivity disorder (ADHD) [F90.9] 11/11/2021    Generalized anxiety disorder [F41.1] 09/03/2021       39 y.o. male with abscess to right leg and arm  Plan:  1. Continue antibiotics. Local wound care. Daily packing changes   2. [address] [address] for diet. 3. Ok for DC home today with PO abx, friend to come and learn dressing changes prior to DC. 4. Follow up with Dr Roland Rooney in office in 1 week    Electronically signed by Darwyn Dakin, DO  on 6/4/2022 at 7:28 AM   Attending Physician Statement  I have discussed the case, including pertinent history and exam findings with the resident. I agree with the assessment, plan and orders as documented by the resident.     Surgically stable for discharge

## 2022-06-04 NOTE — PROGRESS NOTES
Pt discharged to home in stable condition with belongings. Discharge instructions given. Pt instructed to perform daily dressing changes to RUE and RLE. Pt denies having any further questions at this time. Locked up home medication(s)/personal items given to patient at discharge. Patient/family state they have everything they were admitted with.   Dressing change supplies sent home with patient

## 2022-06-04 NOTE — PROGRESS NOTES
[facility]  Office: [address], DO, Rebeca Nuno, DO, Sadaf Allen, DO, Jamal Moffett, DO, Jimbo Davidson MD, Yris Peres MD, Kennis Osler, MD, Yennifer Nogueira MD, Kaila Durham MD, Melvin Pozo MD, Reyna Brown MD, Pilar Echevarria, DO, Barb Herrera MD,  Renzo Lockhart DO, Cesar Garcia MD, Raudel Cobb MD, Abeba Vilchis MD, Isabel Jenkins DO, Festus Banks MD, Masha Zurita MD, Shanice Ibanez, , Christa Silva MD, Ann-Marie Hughes [facility], [facility], CNP, Shekhar Calabrese, CNP, Aldo Orta, CNP, Chang Alextingham, CNP, Faustino Davis, CNP, Savita Carey PA-C, Luigi Blackwood, [facility] [place], Kaela Moya, CNP, Roxanna Tsang, CNP, Nic Browne, CNP, Antwan De Oliveira, CNS, Nirmala Obrien, [facility], [place], Abundio Tanner, CNP, Jazmin Arevalo, [facility], [facility]      Daily Progress Note     Admit Date: 6/1/2022  Bed/Room No.  2004/2004-02  Admitting Physician : Kennis Osler, MD  Code Status :Full Code  Hospital Day:  LOS: 3 days   Chief Complaint:     Chief Complaint   Patient presents with    Wound Infection     Staph infection to right arm; started on ATB but pt reports not working     Principal Problem:    Skin pustule  Active Problems:    Generalized anxiety disorder    Attention deficit hyperactivity disorder (ADHD)  Resolved Problems:    * No resolved hospital problems. *    Subjective : Interval History/Significant events :  06/04/22    Patient s/p I&D, patient feels better . He remains afebrile. He has pain on dressing changes. Vitals - Stable afebrile  Labs -abscess culture positive for MRSA. Nursing notes , Consults notes reviewed. Overnight events and updates discussed with Nursing staff . Background History:         Ronda Tsang is 39 y.o. male  Who was admitted to the hospital on 6/1/2022 for treatment of Skin pustule. Patient came to emergency room with right forearm, right knee infection.   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 He had tried baby powder at home but did not help. Patient also plays xzoops mat reported increased sweating that does not help his wounds. Patient was seen recently in emergency room 1 week before for redness and was discharged on oral prednisone and permethrin cream that did not help. He went to urgent care about 5 days before and was given oral doxycycline without any improvement in symptoms. Patient reported drainage from his wound he denies any fevers, chills. Patient has underlying history of ADHD, depression and is on clonidine, Adderall, Lexapro. Patient was treated with IV vancomycin. General surgery was consulted and had bedside I&D. Culture was positive for MRSA. PMH:  Past Medical History:   Diagnosis Date    ADHD (attention deficit hyperactivity disorder)     Depression       Allergies: No Active Allergies   Medications :  lidocaine-EPINEPHrine, 40 mL, IntraDERmal, Once  vancomycin, 1,000 mg, IntraVENous, Q12H  amphetamine-dextroamphetamine, 30 mg, Oral, Daily  cloNIDine, 0.2 mg, Oral, BID  escitalopram, 20 mg, Oral, Daily  sodium chloride flush, 5-40 mL, IntraVENous, 2 times per day  enoxaparin, 40 mg, SubCUTAneous, Daily  vancomycin (VANCOCIN) intermittent dosing (placeholder), , Other, RX Placeholder        Review of Systems   Review of Systems   Constitutional: Negative for activity change, appetite change, fatigue, fever and unexpected weight change. HENT: Negative for congestion, nosebleeds, rhinorrhea, sinus pressure, sneezing and voice change. Respiratory: Negative for cough, choking, chest tightness, shortness of breath and wheezing. Cardiovascular: Negative for chest pain, palpitations and leg swelling. Gastrointestinal: Negative for abdominal pain, constipation, diarrhea, nausea and vomiting.    Genitourinary: Negative for difficulty urinating, dysuria, frequency, penile discharge and testicular pain.   Musculoskeletal: Negative for back pain. Skin: Positive for color change and wound. Negative for rash. Neurological: Negative for dizziness, weakness, light-headedness and headaches. Hematological: Does not bruise/bleed easily. Psychiatric/Behavioral: Negative for agitation, behavioral problems, confusion, self-injury, sleep disturbance and suicidal ideas. Objective :      Current Vitals : Temp: 98.1 °F (36.7 °C),  Heart Rate: 69, Resp: 16, BP: 109/65, SpO2: 98 %  Last 24 Hrs Vitals   Patient Vitals for the past 24 hrs:   BP Temp Temp src Pulse Resp SpO2   06/04/22 0713 109/65 98.1 °F (36.7 °C) Oral 69 16 98 %   06/03/22 1934 (!) 127/55 98.1 °F (36.7 °C) Oral 76 18 98 %   06/03/22 1529 133/69 97.3 °F (36.3 °C) Oral 74 18 97 %     Intake / output   06/02 1901 - 06/04 0700  In: 3120.7 [I.V.:2492.3]  Out: 7137 [Urine:3815]  Physical Exam:  Physical Exam  Vitals and nursing note reviewed. Constitutional:       General: He is not in acute distress. Appearance: He is not diaphoretic. HENT:      Head: Normocephalic and atraumatic. Nose:      Right Sinus: No maxillary sinus tenderness or frontal sinus tenderness. Left Sinus: No maxillary sinus tenderness or frontal sinus tenderness. Mouth/Throat:      Pharynx: No oropharyngeal exudate. Eyes:      General: No scleral icterus. Conjunctiva/sclera: Conjunctivae normal.      Pupils: Pupils are equal, round, and reactive to light. Neck:      Thyroid: No thyromegaly. Vascular: No JVD. Cardiovascular:      Rate and Rhythm: Normal rate and regular rhythm. Pulses:           Dorsalis pedis pulses are 2+ on the right side and 2+ on the left side. Heart sounds: Normal heart sounds. No murmur heard. Pulmonary:      Effort: Pulmonary effort is normal.      Breath sounds: Normal breath sounds. No wheezing or rales. Abdominal:      Palpations: Abdomen is soft. There is no mass. Tenderness:  There is no abdominal tenderness. Musculoskeletal:      Cervical back: Full passive range of motion without pain and neck supple. Comments: Right forearm dressing in place at I&D site. Lymphadenopathy:      Head:      Right side of head: No submandibular adenopathy. Left side of head: No submandibular adenopathy. Cervical: No cervical adenopathy. Skin:     General: Skin is warm. Comments: Forearm and knee wound examined. Swelling improved. Surgically opened for I&D. Neurological:      Mental Status: He is alert and oriented to person, place, and time. Motor: No tremor. Psychiatric:         Behavior: Behavior is cooperative. Laboratory findings:    Recent Labs     06/01/22 1737 06/02/22  0530   WBC 12.9* 9.5   HGB 13.9 13.5   HCT 41.8 42.1    247   SEDRATE 22*  --      Recent Labs     06/01/22 1737 06/02/22 0530 06/04/22  1201    139  --    K 4.4 4.7  --     105  --    CO2 24 29  --    GLUCOSE 140* 106*  --    BUN 18 15 14   CREATININE 1.14 1.00 0.79   CALCIUM 8.5* 8.2*  --      No results for input(s): PROT, LABALBU, LABA1C, F0UVXIO, N8UTUPF, FT4, TSH, AST, ALT, LDH, GGT, ALKPHOS, BILITOT, BILIDIR, AMMONIA, AMYLASE, LIPASE, LACTATE, CHOL, HDL, LDLCHOLESTEROL, CHOLHDLRATIO, TRIG, VLDL, BNP, TROPONINI, CKTOTAL, CKMB, CKMBINDEX, RF, TIFFANY in the last 72 hours. No results found for: Claudette Ling, [address] [place] [address], BLOODU, BACTERIA, NITRU, [address] [facility], LEUKOCYTESUR    Imaging / Clinical Data :-   No results found. Clinical Course : stable  Assessment and Plan  :        1. Right forearm and right leg cellulitis with abscess-MRSA infection. Treated with vancomycin. Discharged on Bactrim. Continue iodoform gauze packing . Daily dressing changes. Follow-up with PCP. Percocet for pain for dressing changes. Limited supply given. Recommend stool softeners. 2. Depression - stable   3. ADHD - on adderall     Discharge home with biotics.       Plan and updates discussed with patient ,  answers  explained to satisfaction.    Plan discussed with Staff Kei Pinedo RN     (Please note that portions of this note were completed with a voice recognition program. Efforts were made to edit the dictations but occasionally words are mis-transcribed.)      Roland Quiroz MD  6/4/2022

## 2022-06-04 NOTE — PROGRESS NOTES
[facility]  Progress Note    Santos Rust  MRN: [id number]  Date: 6/4/2022  LOS: 3     Reason for F/U :   Multiple skin and soft tissue abscesses with associated cellulitis    Impression :   1. Multiple right upper extremity/forearm abscesses with associated cellulitis  2. Right knee soft tissue abscess  · Cultures with MRSA  · Status post incision and drainage 322    Recommendations:   · Continue intravenous antimicrobial therapy with vancomycin. · The soft tissue changes/abscess at the right knee has resolved and the patient has an open surgical wound with packing in place. · Cellulitic changes are markedly improved. · Infectious disease wise the patient is okay for discharge on oral antimicrobial therapy with Bactrim    Infection Control Recommendations:   Contact precautions    Discharge Planning:   Estimated Length of IV antimicrobials: While hospitalized  Patient will need Midline Catheter Insertion/ PICC line Insertion: No  Patient willneed outpatient wound care: Yes    Medical Decision making / Summary of Stay:   Santos Rust is a 39y.o.-year-old male who was initially admitted on 6/1/2022. Nell Hannon has no significant past medical history and he tells me that he recently started taekwondo and has been participating in [employer]. About 2 weeks ago he reports he started to develop lesions on his skin in the right arm as well as at the right knee. On Wednesday about a week ago he ended up coming here was diagnosed with a rash started on prednisone and sent home but by Friday he was getting worsening lesions and he ended up going to an urgent care on [place] where he was started on doxycycline for what was presumed to be a staphylococcal skin infection. The patient reports that the skin lesions were worsening over the weekend and on Monday he went to an urgent care and remains well with the doxycycline was switched to Bactrim.   The patient has had continued worsening soft tissue changes with increasing pain and pustular like changes and he came back into the emergency department and has since been admitted started on IV antimicrobial therapy with vancomycin. The patient did have some purulent material expressed and so far the culture has grown out Staph aureus. I was asked to evaluate and help with antibiotic choice. Current evaluation:2022    /65   Pulse 69   Temp 98.1 °F (36.7 °C) (Oral)   Resp 16   Ht 5' 9\" (1.753 m)   Wt 171 lb 12.8 oz (77.9 kg)   SpO2 98%   BMI 25.37 kg/m²     Temperature Range: Temp: 98.1 °F (36.7 °C) Temp  Av.9 °F (36.6 °C)  Min: 97.3 °F (36.3 °C)  Max: 98.1 °F (36.7 °C)  Patient is seen and evaluated at bedside and is awake and alert in no acute distress. No subjective fevers or chills. No abdominal pain nausea vomiting or diarrhea. No chest pains or palpitations. He still does have pain but the pain is markedly improved. He did undergo dressing changes earlier today by the surgical team.    Review of Systems   Constitutional: Negative. Respiratory: Negative. Cardiovascular: Negative. Gastrointestinal: Negative. Genitourinary: Negative. Musculoskeletal: Negative. Skin: Positive for wound. Allergic/Immunologic: Negative. Neurological: Negative. Physical Examination :     Physical Exam  Constitutional:       Appearance: He is well-developed. HENT:      Head: Normocephalic and atraumatic. Cardiovascular:      Rate and Rhythm: Normal rate. Heart sounds: Normal heart sounds. No friction rub. No gallop. Pulmonary:      Effort: Pulmonary effort is normal.      Breath sounds: Normal breath sounds. No wheezing. Abdominal:      General: Bowel sounds are normal.      Palpations: Abdomen is soft. There is no mass. Tenderness: There is no abdominal tenderness. Musculoskeletal:         General: Normal range of motion. Cervical back: Normal range of motion and neck supple. Comments:  There is a large dressing in the right forearm which was not removed   Lymphadenopathy:      Cervical: No cervical adenopathy. Skin:     General: Skin is warm and dry. Comments: The abscess at the right knee was seen and there is an open wound with packing in place. Cellulitic changes are markedly improved. Neurological:      Mental Status: He is alert and oriented to person, place, and time. Laboratory data:   I have independently reviewed the followinglabs:  CBC with Differential:   Recent Labs     06/01/22 1737 06/02/22  0530   WBC 12.9* 9.5   HGB 13.9 13.5   HCT 41.8 42.1    247   LYMPHOPCT 17* 26   MONOPCT 10 13*     BMP:   Recent Labs     06/01/22 1737 06/02/22  0530    139   K 4.4 4.7    105   CO2 24 29   BUN 18 15   CREATININE 1.14 1.00     Hepatic Function Panel: No results for input(s): PROT, LABALBU, BILIDIR, IBILI, BILITOT, ALKPHOS, ALT, AST in the last 72 hours. No results found for: PROCAL  Lab Results   Component Value Date    CRP 6.1 06/01/2022     Lab Results   Component Value Date    SEDRATE 22 (H) 06/01/2022         No results found for: DDIMER  No results found for: FERRITIN  No results found for: LDH  No results found for: FIBRINOGEN    No results found for requested labs within last 30 days. No results found for: COVID19    Recent Labs     06/02/22  0530   VANCOTROUGH 8.0*       Imaging Studies:   No new imaging    Cultures:     Culture, Anaerobic and Aerobic [[id number]] (Abnormal) Collected: 06/03/22 0730   Order Status: Completed Specimen: Abscess Updated: 06/03/22 1351    Specimen Description . ABSCESS RIGHT HAND    Direct Exam MODERATE NEUTROPHILS Abnormal      FEW GRAM POSITIVE COCCI IN CLUSTERS Abnormal     Culture PENDING     Culture, Anaerobic and Aerobic [[id number]] (Abnormal)  Collected: 06/01/22 1735   Order Status: Completed Specimen: Abscess Updated: 06/03/22 1428    Specimen Description . ABSCESS RT FA    Direct Exam NO NEUTROPHILS SEEN     NO ORGANISMS SEEN    Culture METHICILLIN RESISTANT STAPHYLOCOCCUS AUREUS LIGHT GROWTH Abnormal      No anaerobic organisms isolated at 2 days. Methicillin-Resistant Staphylococcus aureus (1)    Antibiotic Interpretation Microscan Method Status    penicillin Resistant >=0.5 BACTERIAL SUSCEPTIBILITY PANEL ABEBE Preliminary    clindamycin Resistant >=8 BACTERIAL SUSCEPTIBILITY PANEL ABEBE Preliminary    erythromycin Resistant >=8 BACTERIAL SUSCEPTIBILITY PANEL ABEBE Preliminary    gentamicin Sensitive <=0.5 BACTERIAL SUSCEPTIBILITY PANEL ABEBE Preliminary     Gentamicin is used only in combination with other active agents that test susceptible. levofloxacin Sensitive 0.25 BACTERIAL SUSCEPTIBILITY PANEL ABEBE Preliminary    oxacillin Resistant >=4 BACTERIAL SUSCEPTIBILITY PANEL ABEBE Preliminary    tetracycline Resistant >=16 BACTERIAL SUSCEPTIBILITY PANEL ABEBE Preliminary    trimethoprim-sulfamethoxazole Sensitive <=10 BACTERIAL SUSCEPTIBILITY PANEL ABEBE Preliminary    vancomycin Sensitive 1 BACTERIAL SUSCEPTIBILITY PANEL ABEBE Preliminary          Specimen Collected: 06/01/22 17:35 Last Resulted: 06/03/22 14:28          Culture, Blood 1 [[id number]] (Abnormal) Collected: 06/01/22 8833   Order Status: Completed Specimen: Blood Updated: 06/03/22 1115    Specimen Description . BLOOD    Special Requests LT AC,12ML    Culture POSITIVE Blood Culture Abnormal      DIRECT GRAM STAIN FROM BOTTLE: GRAM POSITIVE COCCI IN CLUSTERS     Staphylococcus epidermidis Detected: mecA/C Gene Detected- Methicillin Resistant Organism Methodology- Polymerase Chain Reaction (PCR)     STAPHYLOCOCCUS EPIDERMIDIS A single positive blood culture of coagulase negative Staphylocci, diphtheroids,micrococci, Cutibacterium, viridans Streptocci, Bacillus, or Lactobacillus species should be interpreted with caution and viewed as a likely skin contaminant. Abnormal      (NOTE) Direct Gram Stain from bottle and Polymerase Chain Reaction (PCR) results called to and read back by: FERNANDA Ashley. 6/2/2022 1620     Culture, Blood 2 [[id number]] Collected: 06/01/22 1730   Order Status: Completed Specimen: Blood Updated: 06/03/22 2209    Specimen Description . BLOOD    Special Requests LT WRIST,12ML    Culture NO GROWTH 2 DAYS       Medications:      lidocaine-EPINEPHrine  40 mL IntraDERmal Once    vancomycin  1,000 mg IntraVENous Q12H    amphetamine-dextroamphetamine  30 mg Oral Daily    cloNIDine  0.2 mg Oral BID    escitalopram  20 mg Oral Daily    sodium chloride flush  5-40 mL IntraVENous 2 times per day    enoxaparin  40 mg SubCUTAneous Daily    vancomycin (VANCOCIN) intermittent dosing (placeholder)   Other RX Placeholder           [facility]  Ashley Mccullough MD  Perfect [employer] messaging  OFFICE: [phone]      Electronically signed by Ashley Mccullough MD on 6/4/2022 at 9:43 AM  Thank you for allowing us to participate in the care of this patient. Please call with questions. This note iscreated with the assistance of a speech recognition program.  While intending to generate a document that actually reflects the content of the visit, the document can still have some errors including those of syntax andsound a like substitutions which may escape proof reading. In such instances, actual meaning can be extrapolated by contextual diversion.

## 2022-06-05 LAB
CULTURE: ABNORMAL
CULTURE: ABNORMAL
DIRECT EXAM: ABNORMAL
DIRECT EXAM: ABNORMAL
SPECIMEN DESCRIPTION: ABNORMAL

## 2022-06-05 NOTE — DISCHARGE SUMMARY
[facility]  Office: [phone]  [facility] DO, Kandis Llamas MD, Beth Burdick MD, Michele Burnett MD, Wilner Turpin MD, Nitin Leyva MD, Shawn Caba MD, Cele Rocha MD, Roberto Seals MD, Edilia Skinner MD, Kenya Lomax DO, Gemma Bond MD, Rosalinda Moss MD, Amanda Quintana DO, Selina Peters MD,  Karan Bhandari DO, Shayy Johnston MD, Beverly Lou MD, Fabio Khan CNP, [facility] CNP, Jen Moya CNP, Esme Haque CNS, Debora Schmitt CNP, Margaret Todd CNP, Scar Hendrix CNP, Hazel Rodríguez CNP, Maryann Francisco CNP, Zainab Mccarty PA-C, Sebasitán Sanchez CNP, Brinda Aleman CNP, Krystal Jorgensen CNP, Milo Meehan CNP, Silverio Barroso CNP, [place] [facility]      Discharge Summary     Patient ID: Wendy Ordonez  :  1980   MRN: [id number]     ACCOUNT:  [de-identified]   Patient Location :   Patient's PCP: Freda Jerez MD  Admit Date: 2022   Discharge Date: 2022     Length of Stay: 3  Code Status:  Prior  Admitting Physician: Michele Burnett MD  Discharge Physician: Michele Burnett MD     Active Discharge Diagnosis :     Primary Problem  Cutaneous abscess of right upper extremity      [facility] Problems    Diagnosis Date Noted    Cutaneous abscess of right upper extremity [L02.413]      Priority: Medium    Cellulitis and abscess of upper extremity [L03.119, L02.419]      Priority: Medium    Attention deficit hyperactivity disorder (ADHD) [F90.9] 2021    Generalized anxiety disorder [F41.1] 2021       Admission Condition:  fair     Discharged Condition: stable    Hospital Stay:     Hospital Course:  Wendy Ordonez is a 89 [facility] y.o. male who was admitted for the management of   Cutaneous abscess of right upper extremity , presented to ER with Wound Infection (Staph infection to right arm; started on ATB but pt reports not working)    Patient came to emergency room with right forearm, right knee infection. 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 He had tried baby powder at home but did not help. Patient also plays [employer] mat reported increased sweating that does not help his wounds. Patient was seen recently in emergency room 1 week before for redness and was discharged on oral prednisone and permethrin cream that did not help. He went to urgent care about 5 days before and was given oral doxycycline without any improvement in symptoms. Patient reported drainage from his wound he denies any fevers, chills. Patient has underlying history of ADHD, depression and is on clonidine, Adderall, Lexapro. Patient was treated with IV vancomycin. General surgery was consulted and had bedside I&D. Culture was positive for MRSA. He was discharged on Bactrim. Significant therapeutic interventions:   1. Right forearm and right leg cellulitis with abscess-MRSA infection. Treated with vancomycin. Discharged on Bactrim. Continue iodoform gauze packing . Daily dressing changes. Follow-up with PCP. Percocet for pain for dressing changes. Limited supply given. Recommend stool softeners. 2. Depression - stable   3.  ADHD - on adderall       Significant Diagnostic Studies:   Labs / Micro:/Radiology  Recent Labs     06/02/22  0530 06/01/22  1737   WBC 9.5 12.9*   HGB 13.5 13.9   HCT 42.1 41.8   MCV 94.2 90.7    295     Labs Renal Latest Ref Rng & Units 6/4/2022 6/2/2022 6/1/2022   BUN 6 - 20 mg/dL 14 15 18   Cr 0.70 - 1.20 mg/dL 0.79 1.00 1.14   K 3.7 - 5.3 mmol/L - 4.7 4.4   Na 135 - 144 mmol/L - 139 135     No results found for: ALT, AST, GGT, ALKPHOS, BILITOT  No results found for: TSHFT4, TSH  No results found for: HAV, HEPAIGM, HEPBIGM, HEPBCAB, HBEAG, HEPCAB  No results found for: VOL, APPEARANCE, COLORU, LABSPEC, LABPH, LEUKBLD, NITRU, GLUCOSEU, KETUA, [address], KETUA, [place]  No results found for: LABA1C  No results found for: EAG  No results found for: INR, PROTIME    CT FEMUR RIGHT W CONTRAST    Result Date: 6/2/2022  1. 5.1 x 1.1 x 2.2 cm somewhat irregular fluid collection over the anterior and anteromedial aspects of the right leg at the level of the proximal tibial metaphysis suspicious for an abscess. Subcutaneous fat stranding of the proximal leg and distal fibula compatible with cellulitis. No soft tissue gas. 2. No acute osseous abnormality. 3. Mild likely reactive right inguinal lymphadenopathy. Consultations:    Consults:     Final Specialist Recommendations/Findings:   PHARMACY TO DOSE VANCOMYCIN  IP CONSULT TO INTERNAL MEDICINE  IP CONSULT TO INFECTIOUS DISEASES  PHARMACY TO DOSE VANCOMYCIN  IP CONSULT TO GENERAL SURGERY      The patient was seen and examined on day of discharge and this discharge summary is in conjunction with any daily progress note from day of discharge. Discharge plan:     Disposition: Home    Physician Follow Up:     Annamaria Nielsen MD  [address] [place] [address]  [phone]    In 1 week  hospital follow up       Requiring Further Evaluation/Follow Up POST HOSPITALIZATION/Incidental Findings: follow up with PCP. Betadine guaze packing. Diet: regular diet    Activity: As tolerated. Instructions to Patient: OK to wash and take shower. Discharge Medications:      Medication List      START taking these medications    docusate sodium 100 mg capsule  Commonly known as: COLACE  Take 1 capsule by mouth 2 times daily     oxyCODONE-acetaminophen 5-325 MG per tablet  Commonly known as: PERCOCET  Take 1 tablet by mouth every 6 hours as needed for Pain for up to 3 days. Notes to patient: Take half an hour to an hour before dressing changes.       sulfamethoxazole-trimethoprim 800-160 MG per tablet  Commonly known as: BACTRIM DS;SEPTRA DS  Take 1 tablet by mouth 2 times daily for 7 days        CHANGE how you take these medications    amphetamine-dextroamphetamine 30 MG extended release capsule  Commonly known as: Adderall XR  Take 1 capsule by mouth daily for 30 days. What changed: Another medication with the same name was removed. Continue taking this medication, and follow the directions you see here. CONTINUE taking these medications    cloNIDine 0.2 MG tablet  Commonly known as: Catapres  Take 1 tablet by mouth 2 times daily     diphenhydrAMINE 25 MG capsule  Commonly known as: BENADRYL  Take 1 capsule by mouth every 6 hours as needed for Itching     escitalopram 20 MG tablet  Commonly known as: LEXAPRO  Take 1 tablet by mouth daily        STOP taking these medications    doxycycline hyclate 100 mg capsule  Commonly known as: VIBRAMYCIN     permethrin 5 % cream  Commonly known as: ELIMITE           Where to Get Your Medications      These medications were sent to [facility] [id number]  [facility], [address] [facility]    Phone: [phone]   · docusate sodium 100 mg capsule  · sulfamethoxazole-trimethoprim 800-160 MG per tablet     You can get these medications from any pharmacy    Bring a paper prescription for each of these medications  · oxyCODONE-acetaminophen 5-325 MG per tablet         Time Spent on discharge is  25 mins in patient examination, evaluation, counseling as well as medication reconciliation, prescriptions for required medications, discharge plan and follow up. Electronically signed by   Michele Burnett MD  6/5/2022        Thank you Dr. Freda Jerez MD for the opportunity to be involved in this patient's care.

## 2022-06-06 ENCOUNTER — TELEPHONE (OUTPATIENT)
Dept: FAMILY MEDICINE CLINIC | Age: 42
End: 2022-06-06

## 2022-06-06 LAB
CULTURE: NORMAL
Lab: NORMAL
SPECIMEN DESCRIPTION: NORMAL

## 2022-06-08 ENCOUNTER — OFFICE VISIT (OUTPATIENT)
Dept: FAMILY MEDICINE CLINIC | Age: 42
End: 2022-06-08
Payer: COMMERCIAL

## 2022-06-08 VITALS
SYSTOLIC BLOOD PRESSURE: 110 MMHG | DIASTOLIC BLOOD PRESSURE: 78 MMHG | WEIGHT: 178.6 LBS | HEART RATE: 93 BPM | HEIGHT: 69 IN | BODY MASS INDEX: 26.45 KG/M2 | OXYGEN SATURATION: 94 %

## 2022-06-08 DIAGNOSIS — L02.419 CELLULITIS AND ABSCESS OF UPPER EXTREMITY: Primary | ICD-10-CM

## 2022-06-08 DIAGNOSIS — F33.1 MODERATE EPISODE OF RECURRENT MAJOR DEPRESSIVE DISORDER (HCC): ICD-10-CM

## 2022-06-08 DIAGNOSIS — L03.119 CELLULITIS AND ABSCESS OF UPPER EXTREMITY: Primary | ICD-10-CM

## 2022-06-08 DIAGNOSIS — F41.1 GENERALIZED ANXIETY DISORDER: ICD-10-CM

## 2022-06-08 DIAGNOSIS — F90.9 ATTENTION DEFICIT HYPERACTIVITY DISORDER (ADHD), UNSPECIFIED ADHD TYPE: ICD-10-CM

## 2022-06-08 PROCEDURE — G8427 DOCREV CUR MEDS BY ELIG CLIN: HCPCS | Performed by: STUDENT IN AN ORGANIZED HEALTH CARE EDUCATION/TRAINING PROGRAM

## 2022-06-08 PROCEDURE — G8419 CALC BMI OUT NRM PARAM NOF/U: HCPCS | Performed by: STUDENT IN AN ORGANIZED HEALTH CARE EDUCATION/TRAINING PROGRAM

## 2022-06-08 PROCEDURE — 1111F DSCHRG MED/CURRENT MED MERGE: CPT | Performed by: STUDENT IN AN ORGANIZED HEALTH CARE EDUCATION/TRAINING PROGRAM

## 2022-06-08 PROCEDURE — 1036F TOBACCO NON-USER: CPT | Performed by: STUDENT IN AN ORGANIZED HEALTH CARE EDUCATION/TRAINING PROGRAM

## 2022-06-08 PROCEDURE — 99214 OFFICE O/P EST MOD 30 MIN: CPT | Performed by: STUDENT IN AN ORGANIZED HEALTH CARE EDUCATION/TRAINING PROGRAM

## 2022-06-08 RX ORDER — DEXTROAMPHETAMINE SACCHARATE, AMPHETAMINE ASPARTATE MONOHYDRATE, DEXTROAMPHETAMINE SULFATE AND AMPHETAMINE SULFATE 7.5; 7.5; 7.5; 7.5 MG/1; MG/1; MG/1; MG/1
30 CAPSULE, EXTENDED RELEASE ORAL DAILY
Qty: 30 CAPSULE | Refills: 0 | Status: ON HOLD | OUTPATIENT
Start: 2022-07-08 | End: 2022-09-07 | Stop reason: HOSPADM

## 2022-06-08 RX ORDER — DEXTROAMPHETAMINE SACCHARATE, AMPHETAMINE ASPARTATE MONOHYDRATE, DEXTROAMPHETAMINE SULFATE AND AMPHETAMINE SULFATE 7.5; 7.5; 7.5; 7.5 MG/1; MG/1; MG/1; MG/1
30 CAPSULE, EXTENDED RELEASE ORAL DAILY
Qty: 30 CAPSULE | Refills: 0 | Status: SHIPPED | OUTPATIENT
Start: 2022-06-08 | End: 2022-06-08

## 2022-06-08 RX ORDER — MUPIROCIN CALCIUM 20 MG/G
CREAM TOPICAL
Qty: 30 G | Refills: 2 | Status: SHIPPED | OUTPATIENT
Start: 2022-06-08 | End: 2022-07-08

## 2022-06-08 RX ORDER — CLONIDINE HYDROCHLORIDE 0.2 MG/1
0.2 TABLET ORAL 2 TIMES DAILY
Qty: 60 TABLET | Refills: 5 | Status: SHIPPED | OUTPATIENT
Start: 2022-06-08

## 2022-06-08 RX ORDER — ESCITALOPRAM OXALATE 20 MG/1
20 TABLET ORAL DAILY
Qty: 30 TABLET | Refills: 5 | Status: SHIPPED | OUTPATIENT
Start: 2022-06-08 | End: 2022-09-07

## 2022-06-08 RX ORDER — DEXTROAMPHETAMINE SACCHARATE, AMPHETAMINE ASPARTATE MONOHYDRATE, DEXTROAMPHETAMINE SULFATE AND AMPHETAMINE SULFATE 7.5; 7.5; 7.5; 7.5 MG/1; MG/1; MG/1; MG/1
30 CAPSULE, EXTENDED RELEASE ORAL DAILY
Qty: 30 CAPSULE | Refills: 0 | Status: SHIPPED | OUTPATIENT
Start: 2022-08-08 | End: 2022-09-07

## 2022-06-08 NOTE — PROGRESS NOTES
Subjective:  Raj Bell presents for   Chief Complaint   Patient presents with    Follow-Up from Hospital     infection in leg , developed rash few weeks later     Discuss Medications    Anxiety       HPI   Here for follow-up from the hospital.  States that he had an infection in the leg and then developed a rash few weeks later. Was previously on doxycycline and has now been switched to Bactrim which has helped tremendously. Patient Active Problem List   Diagnosis    Generalized anxiety disorder    Moderate episode of recurrent major depressive disorder (HCC)    Attention deficit hyperactivity disorder (ADHD)    Cutaneous abscess of right upper extremity    Cellulitis and abscess of upper extremity         Review of Systems   All other systems reviewed and are negative. Objective:  Physical Exam   Vitals:   Vitals:    06/08/22 1538   BP: 110/78   Pulse: 93   SpO2: 94%   Weight: 178 lb 9.6 oz (81 kg)   Height: 5' 9\" (1.753 m)     Wt Readings from Last 3 Encounters:   06/08/22 178 lb 9.6 oz (81 kg)   06/03/22 171 lb 12.8 oz (77.9 kg)   05/25/22 174 lb 11.2 oz (79.2 kg)     Ht Readings from Last 3 Encounters:   06/08/22 5' 9\" (1.753 m)   06/01/22 5' 9\" (1.753 m)   05/04/22 5' 9\" (1.753 m)     Body mass index is 26.37 kg/m². Physical Exam  Vitals reviewed. Constitutional:       General: He is not in acute distress. Appearance: Normal appearance. HENT:      Mouth/Throat:      Mouth: Mucous membranes are moist.   Eyes:      Conjunctiva/sclera: Conjunctivae normal.   Skin:     General: Skin is warm and dry. Comments: Wound dressings of right leg and right arm. Neurological:      Mental Status: He is alert and oriented to person, place, and time. Psychiatric:         Mood and Affect: Mood normal.            Assessment/Plan:    Diagnosis Orders   1. Cellulitis and abscess of upper extremity  mupirocin (BACTROBAN) 2 % cream   2.  Attention deficit hyperactivity disorder (ADHD), unspecified ADHD type  amphetamine-dextroamphetamine (ADDERALL XR) 30 MG extended release capsule    amphetamine-dextroamphetamine (ADDERALL XR) 30 MG extended release capsule    DISCONTINUED: amphetamine-dextroamphetamine (ADDERALL XR) 30 MG extended release capsule   3. Generalized anxiety disorder  cloNIDine (CATAPRES) 0.2 MG tablet    escitalopram (LEXAPRO) 20 MG tablet   4. Moderate episode of recurrent major depressive disorder (HCC)  escitalopram (LEXAPRO) 20 MG tablet        Return in about 3 months (around 9/8/2022). Cellulitis-we will also send in Bactroban. Advised to continue with Bactrim and follow-up with specialist.    ADHD-currently controlled with Adderall 30 mg. We will continue current regimen. Controlled Substance Monitoring:    Acute and Chronic Pain Monitoring:   RX Monitoring 6/9/2022   Attestation -   Periodic Controlled Substance Monitoring No signs of potential drug abuse or diversion identified. Generalized anxiety- currently controlled with Lexapro and clonidine. We will continue with this regimen.

## 2022-06-20 RX ORDER — SULFAMETHOXAZOLE AND TRIMETHOPRIM 800; 160 MG/1; MG/1
1 TABLET ORAL 2 TIMES DAILY
Qty: 14 TABLET | Refills: 0 | Status: SHIPPED | OUTPATIENT
Start: 2022-06-20 | End: 2022-06-27

## 2022-06-20 NOTE — TELEPHONE ENCOUNTER
----- Message from Adithya San sent at 6/20/2022 11:04 AM EDT -----  Subject: Refill Request    QUESTIONS  Name of Medication? sulfamethoxazole-trimethoprim (BACTRIM DS;SEPTRA DS)   800-160 MG per tablet  Patient-reported dosage and instructions? 800-160mg (Twice daily)  How many days do you have left? 0  Preferred Pharmacy? [facility] [id number]  Pharmacy phone number (if available)? [phone]  Additional Information for Provider? A script was written for cream also   but patient did not get that filled due to being $230.00 dollars. Patient   wants a refill on medication due to making sure everything is clearing up. His knee is tender and red (not feeling at all till now feeling it) due to   running out of medication. His arm is way better than knee.  ---------------------------------------------------------------------------  --------------  CALL BACK INFO  What is the best way for the office to contact you? OK to leave message on   voicemail  Preferred Call Back Phone Number? [phone]  ---------------------------------------------------------------------------  --------------  SCRIPT ANSWERS  Relationship to Patient?  Self

## 2022-07-05 ENCOUNTER — TELEPHONE (OUTPATIENT)
Dept: FAMILY MEDICINE CLINIC | Age: 42
End: 2022-07-05

## 2022-07-05 NOTE — TELEPHONE ENCOUNTER
----- Message from Jose Palm sent at 7/5/2022  1:45 PM EDT -----  Subject: Appointment Request    Reason for Call: Established Patient Appointment needed: Routine Hospital   Follow Up    QUESTIONS    Reason for appointment request? No appointments available during search     Additional Information for Provider?  Pt would like to be seen for hospital   follow up from having staph infection, would like to come in sometime this   week if possible   ---------------------------------------------------------------------------  --------------  [id number] [employer]  [phone]; OK to leave message on voicemail  ---------------------------------------------------------------------------  --------------  SCRIPT ANSWERS  COVID Screen: Kandis Chowdary

## 2022-07-06 ENCOUNTER — TELEPHONE (OUTPATIENT)
Dept: FAMILY MEDICINE CLINIC | Age: 42
End: 2022-07-06

## 2022-07-28 ENCOUNTER — HOSPITAL ENCOUNTER (EMERGENCY)
Age: 42
Discharge: HOME | End: 2022-07-28
Attending: EMERGENCY MEDICINE
Payer: COMMERCIAL

## 2022-07-28 VITALS
HEIGHT: 69 IN | RESPIRATION RATE: 20 BRPM | TEMPERATURE: 97.9 F | DIASTOLIC BLOOD PRESSURE: 70 MMHG | WEIGHT: 175 LBS | SYSTOLIC BLOOD PRESSURE: 118 MMHG | BODY MASS INDEX: 25.92 KG/M2 | OXYGEN SATURATION: 98 % | HEART RATE: 90 BPM

## 2022-07-28 DIAGNOSIS — L02.419 CELLULITIS AND ABSCESS OF LEG: Primary | ICD-10-CM

## 2022-07-28 DIAGNOSIS — L03.119 CELLULITIS AND ABSCESS OF LEG: Primary | ICD-10-CM

## 2022-07-28 PROCEDURE — 6370000000 HC RX 637 (ALT 250 FOR IP): Performed by: NURSE PRACTITIONER

## 2022-07-28 PROCEDURE — 99283 EMERGENCY DEPT VISIT LOW MDM: CPT

## 2022-07-28 RX ORDER — HYDROXYZINE HYDROCHLORIDE 25 MG/1
25 TABLET, FILM COATED ORAL EVERY 6 HOURS PRN
Qty: 20 TABLET | Refills: 0 | Status: SHIPPED | OUTPATIENT
Start: 2022-07-28

## 2022-07-28 RX ORDER — CLINDAMYCIN HYDROCHLORIDE 300 MG/1
300 CAPSULE ORAL 3 TIMES DAILY
Qty: 30 CAPSULE | Refills: 0 | Status: SHIPPED | OUTPATIENT
Start: 2022-07-28 | End: 2022-08-07

## 2022-07-28 RX ORDER — CLINDAMYCIN HYDROCHLORIDE 150 MG/1
300 CAPSULE ORAL ONCE
Status: COMPLETED | OUTPATIENT
Start: 2022-07-28 | End: 2022-07-28

## 2022-07-28 RX ADMIN — CLINDAMYCIN HYDROCHLORIDE 300 MG: 150 CAPSULE ORAL at 23:23

## 2022-07-28 ASSESSMENT — ENCOUNTER SYMPTOMS: COLOR CHANGE: 1

## 2022-07-29 NOTE — PROGRESS NOTES
Physician Progress Note      PATIENT:               Brinda Cardozo  CSN #:                  [id number]  :                       1980  ADMIT DATE:       2022 5:15 PM  [address] DATE:        2022 2:41 PM  RESPONDING  PROVIDER #:        Dontae Charlton MD          QUERY TEXT:    Patient admitted with cutaneous abscess of the arm. Per progress note dated on   22 documentation of debridement. To accurately reflect the procedure   performed please document if debridement was excisional or nonexcisional and   the deepest depth of tissue removed as down to and including: The medical record reflects the following:  Risk Factors: Wound Infection  Clinical Indicators: Per progress note on 22 \"Abscess on right forearm and   right leg opened yesterday\"  Treatment: Bedside ID, IV antibiotics, IVF, GS consult    Thank you,  Harley Shirley RN  Options provided:  -- Nonexcisional debridement of skin  -- Excisional debridement of skin  -- Nonexcisional debridement of subcutaneous tissue  -- Excisional debridement of subcutaneous tissue  -- Nonexcisional debridement of fascia  -- Excisional debridement of fascia  -- Nonexcisional debridement of muscle  -- Other - I will add my own diagnosis  -- Disagree - Not applicable / Not valid  -- Disagree - Clinically unable to determine / Unknown  -- Refer to Clinical Documentation Reviewer    PROVIDER RESPONSE TEXT:    Non-excisional debridement of subcutaneous tissue of right forearm and right   leg was performed during procedure on 6/3/22. Query created by:  Dossie Severs on 2022 12:53 PM      Electronically signed by:  Dontae Charlton MD 2022 4:08 PM

## 2022-07-29 NOTE — ED PROVIDER NOTES
[address] [place] [address] ED  EMERGENCY DEPARTMENT ENCOUNTER      Pt Name: Amada Foote  MRN: [id number]  [place] 1980  Date of evaluation: 7/28/2022  Provider: JANESSA Jeffers CNP    CHIEF COMPLAINT       Chief Complaint   Patient presents with    Rash     Hx MRSA 1 month ago; diffuse rash x1 week. Went to urgent care 3 days ago and put on bactrim which has not helped         HISTORY OFPRESENT ILLNESS  (Location/Symptom, Timing/Onset, Context/Setting, Quality, Duration, Modifying Factors, Severity.)   Amada Foote is a 39 y.o. male who presents to the emergency department by private auto for evaluation of abscess redness to his right leg has been going on for the past 3 days. He went to urgent care 3 days ago was placed on Bactrim which states has not helped his symptoms. Has history of MRSA and cellulitis. Denies fever or chills. No history of diabetes. Does have a history of anxiety. Nursing Notes were reviewed. PASTMEDICAL HISTORY     Past Medical History:   Diagnosis Date    ADHD (attention deficit hyperactivity disorder)     Depression          SURGICAL HISTORY       Past Surgical History:   Procedure Laterality Date    ADENOIDECTOMY AND TYMPANOSTOMY TUBE PLACEMENT      FACIAL COSMETIC SURGERY           CURRENT MEDICATIONS     Previous Medications    AMPHETAMINE-DEXTROAMPHETAMINE (ADDERALL XR) 30 MG EXTENDED RELEASE CAPSULE    Take 1 capsule by mouth daily for 30 days. AMPHETAMINE-DEXTROAMPHETAMINE (ADDERALL XR) 30 MG EXTENDED RELEASE CAPSULE    Take 1 capsule by mouth daily for 30 days. CLONIDINE (CATAPRES) 0.2 MG TABLET    Take 1 tablet by mouth 2 times daily    DIPHENHYDRAMINE (BENADRYL) 25 MG CAPSULE    Take 1 capsule by mouth every 6 hours as needed for Itching    ESCITALOPRAM (LEXAPRO) 20 MG TABLET    Take 1 tablet by mouth daily       ALLERGIES     Patient has no known allergies.     FAMILY HISTORY       Family History   Problem Relation Age of Onset    Rheum Arthritis Mother No Known Problems Father     No Known Problems Sister     No Known Problems Brother           SOCIAL HISTORY       Social History     Socioeconomic History    Marital status: Single     Spouse name: None    Number of children: None    Years of education: None    Highest education level: None   Tobacco Use    Smoking status: Never    Smokeless tobacco: Never   Substance and Sexual Activity    Alcohol use: No    Drug use: No     Social Determinants of Health     Financial Resource Strain: Low Risk     Difficulty of Paying Living Expenses: Not hard at all   Food Insecurity: No Food Insecurity    Worried About [address] in the Last Year: Never true    Ran Out of Food in the Last Year: Never true         REVIEW OF SYSTEMS    (2-9 systems for level 4, 10 or more for level 5)     Review of Systems   Skin:  Positive for color change and wound. Psychiatric/Behavioral:  The patient is nervous/anxious. All other systems reviewed and are negative. Except as noted above the remainder of the review of systems was reviewed and negative. PHYSICAL EXAM    (up to 7 for level 4, 8 or more for level 5)     ED Triage Vitals [07/28/22 2247]   BP Temp Temp Source Heart Rate Resp SpO2 Height Weight   118/70 97.9 °F (36.6 °C) Oral 90 20 98 % 5' 9\" (1.753 m) 175 lb (79.4 kg)       Physical Exam  Constitutional:       Appearance: Normal appearance. He is normal weight. HENT:      Head: Normocephalic. Right Ear: External ear normal.      Left Ear: External ear normal.   Eyes:      Conjunctiva/sclera: Conjunctivae normal.   Pulmonary:      Effort: Pulmonary effort is normal. No respiratory distress. Musculoskeletal:         General: Normal range of motion. Cervical back: Normal range of motion. Skin:     Findings: Erythema present. Comments: There is a firm abscess to the right lower leg with some erythema around the abscess.   Patient has small open abscesses to his right forearm but no cellulitis to the right forearm. Neurological:      Mental Status: He is alert and oriented to person, place, and time. Psychiatric:         Mood and Affect: Mood is anxious. Behavior: Behavior normal.         Thought Content: Thought content normal.         Judgment: Judgment normal.         DIAGNOSTIC RESULTS     EKG:All EKG's are interpreted by the Emergency Department Physician who either signs or Co-signs this chart in the absence of a cardiologist.        RADIOLOGY:   Non-plain film images such as CT, Ultrasound and MRI are read by theradiologist. Plain radiographic images are visualized and preliminarily interpreted by the emergency physician with the below findings:        Interpretation per the Radiologist below, if available at the time of this note:    No orders to display         EDBEDSIDE ULTRASOUND:   Performed by Cielo Lui - none    LABS:  Labs Reviewed - No data to display    All other labs were within normal range or not returned as of this dictation. EMERGENCY DEPARTMENT COURSE andDIFFERENTIAL DIAGNOSIS/MDM:   Examination shows abscess with some cellulitis to the right lower extremity. Abscess is firm. No indication to drain the abscess at this time. Was started on Bactrim 3 days ago. Was instructed to stop taking Bactrim will be discharged on clindamycin and Atarax. He was given dose clindamycin in the ED. Instructed follow-up with his doctor. Return precautions provided. Vitals:    Vitals:    07/28/22 2247   BP: 118/70   Pulse: 90   Resp: 20   Temp: 97.9 °F (36.6 °C)   TempSrc: Oral   SpO2: 98%   Weight: 175 lb (79.4 kg)   Height: 5' 9\" (1.753 m)         CONSULTS:  None    RES:  Procedures    FINAL IMPRESSION      1.  Cellulitis and abscess of leg          DISPOSITION/PLAN   DISPOSITION Decision To Discharge 07/28/2022 11:08:30 PM      PATIENT REFERRED TO:   Cielo Holley [facility] [address] [address] [place] [address]  [phone]    In 1 week      [facility] ED  [place] [address] [address] [facility]  [phone]    If symptoms worsen      DISCHARGE MEDICATIONS:     New Prescriptions    CLINDAMYCIN (CLEOCIN) 300 MG CAPSULE    Take 1 capsule by mouth in the morning and 1 capsule at noon and 1 capsule before bedtime. Do all this for 10 days.     HYDROXYZINE HCL (ATARAX) 25 MG TABLET    Take 1 tablet by mouth every 6 hours as needed for Itching     Electronically signed by JANESSA Layton 7/28/2022 at 11:10 PM           JANESSA Layton CNP  07/28/22 6218

## 2022-07-29 NOTE — DISCHARGE INSTRUCTIONS
Stop taking Bactrim. Instead start taking clindamycin. Take Atarax as needed for itching. Follow-up with your doctor in 1 week. Return to emergency department symptoms worsen.

## 2022-08-07 NOTE — ED PROVIDER NOTES
eMERGENCY dEPARTMENT eNCOUnter   [address] [place] Name: Dania Benitez  MRN: [id number]  [place] 1980  Date of evaluation: 8/7/22     Dania Benitez is a 39 y.o. male with CC: Rash (Hx MRSA 1 month ago; diffuse rash x1 week. Went to urgent care 3 days ago and put on bactrim which has not helped)        This visit was performed by both a physician and an APC. I performed all aspects of the MDM as documented. The care is provided during an unprecedented national emergency due to the novel coronavirus, COVID 19.     Volodymyr Hicsk MD  Attending Emergency Physician            Volodymyr Hicks MD  08/07/22 6067

## 2022-09-04 ENCOUNTER — APPOINTMENT (OUTPATIENT)
Dept: CT IMAGING | Age: 42
DRG: 603 | End: 2022-09-04
Payer: COMMERCIAL

## 2022-09-04 ENCOUNTER — HOSPITAL ENCOUNTER (INPATIENT)
Age: 42
LOS: 3 days | Discharge: HOME | DRG: 603 | End: 2022-09-07
Attending: STUDENT IN AN ORGANIZED HEALTH CARE EDUCATION/TRAINING PROGRAM | Admitting: FAMILY MEDICINE
Payer: COMMERCIAL

## 2022-09-04 DIAGNOSIS — L08.9 INFECTION OF SKIN DUE TO METHICILLIN RESISTANT STAPHYLOCOCCUS AUREUS (MRSA): ICD-10-CM

## 2022-09-04 DIAGNOSIS — B95.62 INFECTION OF SKIN DUE TO METHICILLIN RESISTANT STAPHYLOCOCCUS AUREUS (MRSA): ICD-10-CM

## 2022-09-04 DIAGNOSIS — L02.31 ABSCESS, GLUTEAL, LEFT: ICD-10-CM

## 2022-09-04 DIAGNOSIS — L03.317 CELLULITIS OF BUTTOCK: Primary | ICD-10-CM

## 2022-09-04 PROBLEM — L03.90 CELLULITIS: Status: ACTIVE | Noted: 2022-09-04

## 2022-09-04 LAB
ABSOLUTE EOS #: 0 K/UL (ref 0–0.44)
ABSOLUTE IMMATURE GRANULOCYTE: 0 K/UL (ref 0–0.3)
ABSOLUTE LYMPH #: 1.44 K/UL (ref 1.1–3.7)
ABSOLUTE MONO #: 1.58 K/UL (ref 0.1–1.2)
ANION GAP SERPL CALCULATED.3IONS-SCNC: 10 MMOL/L (ref 9–17)
BASOPHILS # BLD: 0 % (ref 0–2)
BASOPHILS ABSOLUTE: 0 K/UL (ref 0–0.2)
BUN BLDV-MCNC: 14 MG/DL (ref 6–20)
BUN/CREAT BLD: 17 (ref 9–20)
CALCIUM SERPL-MCNC: 8.9 MG/DL (ref 8.6–10.4)
CHLORIDE BLD-SCNC: 99 MMOL/L (ref 98–107)
CO2: 25 MMOL/L (ref 20–31)
CREAT SERPL-MCNC: 0.83 MG/DL (ref 0.7–1.2)
EOSINOPHILS RELATIVE PERCENT: 0 % (ref 1–4)
GFR AFRICAN AMERICAN: >60 ML/MIN
GFR NON-AFRICAN AMERICAN: >60 ML/MIN
GFR SERPL CREATININE-BSD FRML MDRD: ABNORMAL ML/MIN/{1.73_M2}
GLUCOSE BLD-MCNC: 98 MG/DL (ref 70–99)
HCT VFR BLD CALC: 39.7 % (ref 40.7–50.3)
HEMOGLOBIN: 13 G/DL (ref 13–17)
IMMATURE GRANULOCYTES: 0 %
LACTIC ACID, SEPSIS: 0.5 MMOL/L (ref 0.5–1.9)
LACTIC ACID, SEPSIS: 0.6 MMOL/L (ref 0.5–1.9)
LYMPHOCYTES # BLD: 10 % (ref 24–43)
MCH RBC QN AUTO: 29.7 PG (ref 25.2–33.5)
MCHC RBC AUTO-ENTMCNC: 32.7 G/DL (ref 28.4–34.8)
MCV RBC AUTO: 90.6 FL (ref 82.6–102.9)
MONOCYTES # BLD: 11 % (ref 3–12)
NRBC AUTOMATED: 0 PER 100 WBC
PDW BLD-RTO: 13.1 % (ref 11.8–14.4)
PLATELET # BLD: 197 K/UL (ref 138–453)
PMV BLD AUTO: 10.1 FL (ref 8.1–13.5)
POTASSIUM SERPL-SCNC: 4 MMOL/L (ref 3.7–5.3)
RBC # BLD: 4.38 M/UL (ref 4.21–5.77)
SEG NEUTROPHILS: 79 % (ref 36–65)
SEGMENTED NEUTROPHILS ABSOLUTE COUNT: 11.38 K/UL (ref 1.5–8.1)
SODIUM BLD-SCNC: 134 MMOL/L (ref 135–144)
WBC # BLD: 14.4 K/UL (ref 3.5–11.3)

## 2022-09-04 PROCEDURE — 6360000004 HC RX CONTRAST MEDICATION: Performed by: PHYSICIAN ASSISTANT

## 2022-09-04 PROCEDURE — 1200000000 HC SEMI PRIVATE

## 2022-09-04 PROCEDURE — 96365 THER/PROPH/DIAG IV INF INIT: CPT

## 2022-09-04 PROCEDURE — 85025 COMPLETE CBC W/AUTO DIFF WBC: CPT

## 2022-09-04 PROCEDURE — 87529 HSV DNA AMP PROBE: CPT

## 2022-09-04 PROCEDURE — 87040 BLOOD CULTURE FOR BACTERIA: CPT

## 2022-09-04 PROCEDURE — 84145 PROCALCITONIN (PCT): CPT

## 2022-09-04 PROCEDURE — 72193 CT PELVIS W/DYE: CPT

## 2022-09-04 PROCEDURE — 6360000002 HC RX W HCPCS: Performed by: PHYSICIAN ASSISTANT

## 2022-09-04 PROCEDURE — 83036 HEMOGLOBIN GLYCOSYLATED A1C: CPT

## 2022-09-04 PROCEDURE — 2580000003 HC RX 258: Performed by: PHYSICIAN ASSISTANT

## 2022-09-04 PROCEDURE — 6370000000 HC RX 637 (ALT 250 FOR IP): Performed by: NURSE PRACTITIONER

## 2022-09-04 PROCEDURE — 83605 ASSAY OF LACTIC ACID: CPT

## 2022-09-04 PROCEDURE — 6360000002 HC RX W HCPCS: Performed by: NURSE PRACTITIONER

## 2022-09-04 PROCEDURE — 80048 BASIC METABOLIC PNL TOTAL CA: CPT

## 2022-09-04 PROCEDURE — 2580000003 HC RX 258: Performed by: NURSE PRACTITIONER

## 2022-09-04 PROCEDURE — 96375 TX/PRO/DX INJ NEW DRUG ADDON: CPT

## 2022-09-04 PROCEDURE — 99221 1ST HOSP IP/OBS SF/LOW 40: CPT | Performed by: NURSE PRACTITIONER

## 2022-09-04 PROCEDURE — 99285 EMERGENCY DEPT VISIT HI MDM: CPT

## 2022-09-04 RX ORDER — ENOXAPARIN SODIUM 100 MG/ML
40 INJECTION SUBCUTANEOUS DAILY
Status: DISCONTINUED | OUTPATIENT
Start: 2022-09-05 | End: 2022-09-07 | Stop reason: HOSPADM

## 2022-09-04 RX ORDER — MAGNESIUM SULFATE 1 G/100ML
1000 INJECTION INTRAVENOUS PRN
Status: DISCONTINUED | OUTPATIENT
Start: 2022-09-04 | End: 2022-09-07 | Stop reason: HOSPADM

## 2022-09-04 RX ORDER — SODIUM CHLORIDE 0.9 % (FLUSH) 0.9 %
10 SYRINGE (ML) INJECTION PRN
Status: DISCONTINUED | OUTPATIENT
Start: 2022-09-04 | End: 2022-09-07 | Stop reason: HOSPADM

## 2022-09-04 RX ORDER — SODIUM CHLORIDE 0.9 % (FLUSH) 0.9 %
5-40 SYRINGE (ML) INJECTION EVERY 12 HOURS SCHEDULED
Status: DISCONTINUED | OUTPATIENT
Start: 2022-09-04 | End: 2022-09-04 | Stop reason: SDUPTHER

## 2022-09-04 RX ORDER — MORPHINE SULFATE 4 MG/ML
4 INJECTION, SOLUTION INTRAMUSCULAR; INTRAVENOUS ONCE
Status: COMPLETED | OUTPATIENT
Start: 2022-09-04 | End: 2022-09-04

## 2022-09-04 RX ORDER — OXYCODONE HYDROCHLORIDE AND ACETAMINOPHEN 5; 325 MG/1; MG/1
2 TABLET ORAL EVERY 4 HOURS PRN
Status: DISCONTINUED | OUTPATIENT
Start: 2022-09-04 | End: 2022-09-05

## 2022-09-04 RX ORDER — SODIUM CHLORIDE 0.9 % (FLUSH) 0.9 %
5-40 SYRINGE (ML) INJECTION PRN
Status: DISCONTINUED | OUTPATIENT
Start: 2022-09-04 | End: 2022-09-04 | Stop reason: SDUPTHER

## 2022-09-04 RX ORDER — ESCITALOPRAM OXALATE 10 MG/1
20 TABLET ORAL DAILY
Status: DISCONTINUED | OUTPATIENT
Start: 2022-09-05 | End: 2022-09-05

## 2022-09-04 RX ORDER — SODIUM CHLORIDE 9 MG/ML
INJECTION, SOLUTION INTRAVENOUS PRN
Status: DISCONTINUED | OUTPATIENT
Start: 2022-09-04 | End: 2022-09-04 | Stop reason: SDUPTHER

## 2022-09-04 RX ORDER — HYDROMORPHONE HYDROCHLORIDE 1 MG/ML
1 INJECTION, SOLUTION INTRAMUSCULAR; INTRAVENOUS; SUBCUTANEOUS ONCE
Status: COMPLETED | OUTPATIENT
Start: 2022-09-04 | End: 2022-09-04

## 2022-09-04 RX ORDER — ACETAMINOPHEN 325 MG/1
650 TABLET ORAL EVERY 6 HOURS PRN
Status: DISCONTINUED | OUTPATIENT
Start: 2022-09-04 | End: 2022-09-07 | Stop reason: HOSPADM

## 2022-09-04 RX ORDER — HYDROXYZINE HYDROCHLORIDE 25 MG/1
25 TABLET, FILM COATED ORAL EVERY 6 HOURS PRN
Status: DISCONTINUED | OUTPATIENT
Start: 2022-09-04 | End: 2022-09-07 | Stop reason: HOSPADM

## 2022-09-04 RX ORDER — ONDANSETRON 4 MG/1
4 TABLET, ORALLY DISINTEGRATING ORAL EVERY 8 HOURS PRN
Status: DISCONTINUED | OUTPATIENT
Start: 2022-09-04 | End: 2022-09-07 | Stop reason: HOSPADM

## 2022-09-04 RX ORDER — SODIUM CHLORIDE 9 MG/ML
INJECTION, SOLUTION INTRAVENOUS CONTINUOUS
Status: DISCONTINUED | OUTPATIENT
Start: 2022-09-04 | End: 2022-09-07 | Stop reason: HOSPADM

## 2022-09-04 RX ORDER — SODIUM CHLORIDE 0.9 % (FLUSH) 0.9 %
5-40 SYRINGE (ML) INJECTION EVERY 12 HOURS SCHEDULED
Status: DISCONTINUED | OUTPATIENT
Start: 2022-09-04 | End: 2022-09-07 | Stop reason: HOSPADM

## 2022-09-04 RX ORDER — ONDANSETRON 2 MG/ML
4 INJECTION INTRAMUSCULAR; INTRAVENOUS EVERY 6 HOURS PRN
Status: DISCONTINUED | OUTPATIENT
Start: 2022-09-04 | End: 2022-09-07 | Stop reason: HOSPADM

## 2022-09-04 RX ORDER — ACETAMINOPHEN 650 MG/1
650 SUPPOSITORY RECTAL EVERY 6 HOURS PRN
Status: DISCONTINUED | OUTPATIENT
Start: 2022-09-04 | End: 2022-09-07 | Stop reason: HOSPADM

## 2022-09-04 RX ORDER — SODIUM CHLORIDE 9 MG/ML
INJECTION, SOLUTION INTRAVENOUS PRN
Status: DISCONTINUED | OUTPATIENT
Start: 2022-09-04 | End: 2022-09-07 | Stop reason: HOSPADM

## 2022-09-04 RX ORDER — CLONIDINE HYDROCHLORIDE 0.2 MG/1
0.2 TABLET ORAL 2 TIMES DAILY
Status: DISCONTINUED | OUTPATIENT
Start: 2022-09-04 | End: 2022-09-07 | Stop reason: HOSPADM

## 2022-09-04 RX ORDER — 0.9 % SODIUM CHLORIDE 0.9 %
80 INTRAVENOUS SOLUTION INTRAVENOUS ONCE
Status: COMPLETED | OUTPATIENT
Start: 2022-09-04 | End: 2022-09-04

## 2022-09-04 RX ORDER — POTASSIUM CHLORIDE 7.45 MG/ML
10 INJECTION INTRAVENOUS PRN
Status: DISCONTINUED | OUTPATIENT
Start: 2022-09-04 | End: 2022-09-07 | Stop reason: HOSPADM

## 2022-09-04 RX ORDER — ONDANSETRON 2 MG/ML
4 INJECTION INTRAMUSCULAR; INTRAVENOUS ONCE
Status: COMPLETED | OUTPATIENT
Start: 2022-09-04 | End: 2022-09-04

## 2022-09-04 RX ORDER — 0.9 % SODIUM CHLORIDE 0.9 %
1000 INTRAVENOUS SOLUTION INTRAVENOUS ONCE
Status: COMPLETED | OUTPATIENT
Start: 2022-09-04 | End: 2022-09-04

## 2022-09-04 RX ORDER — POTASSIUM CHLORIDE 20 MEQ/1
40 TABLET, EXTENDED RELEASE ORAL PRN
Status: DISCONTINUED | OUTPATIENT
Start: 2022-09-04 | End: 2022-09-07 | Stop reason: HOSPADM

## 2022-09-04 RX ORDER — POLYETHYLENE GLYCOL 3350 17 G/17G
17 POWDER, FOR SOLUTION ORAL DAILY PRN
Status: DISCONTINUED | OUTPATIENT
Start: 2022-09-04 | End: 2022-09-07 | Stop reason: HOSPADM

## 2022-09-04 RX ORDER — KETOROLAC TROMETHAMINE 30 MG/ML
30 INJECTION, SOLUTION INTRAMUSCULAR; INTRAVENOUS EVERY 6 HOURS PRN
Status: DISCONTINUED | OUTPATIENT
Start: 2022-09-04 | End: 2022-09-07 | Stop reason: HOSPADM

## 2022-09-04 RX ORDER — OXYCODONE HYDROCHLORIDE AND ACETAMINOPHEN 5; 325 MG/1; MG/1
1 TABLET ORAL EVERY 4 HOURS PRN
Status: DISCONTINUED | OUTPATIENT
Start: 2022-09-04 | End: 2022-09-05

## 2022-09-04 RX ADMIN — SODIUM CHLORIDE: 9 INJECTION, SOLUTION INTRAVENOUS at 22:38

## 2022-09-04 RX ADMIN — Medication 1 MG: at 21:05

## 2022-09-04 RX ADMIN — KETOROLAC TROMETHAMINE 30 MG: 30 INJECTION, SOLUTION INTRAMUSCULAR; INTRAVENOUS at 23:43

## 2022-09-04 RX ADMIN — HYDROMORPHONE HYDROCHLORIDE 1 MG: 1 INJECTION, SOLUTION INTRAMUSCULAR; INTRAVENOUS; SUBCUTANEOUS at 19:23

## 2022-09-04 RX ADMIN — PIPERACILLIN AND TAZOBACTAM 4500 MG: 4; .5 INJECTION, POWDER, LYOPHILIZED, FOR SOLUTION INTRAVENOUS at 19:26

## 2022-09-04 RX ADMIN — SODIUM CHLORIDE, PRESERVATIVE FREE 10 ML: 5 INJECTION INTRAVENOUS at 22:38

## 2022-09-04 RX ADMIN — ONDANSETRON 4 MG: 2 INJECTION INTRAMUSCULAR; INTRAVENOUS at 18:30

## 2022-09-04 RX ADMIN — MORPHINE SULFATE 4 MG: 4 INJECTION, SOLUTION INTRAMUSCULAR; INTRAVENOUS at 18:30

## 2022-09-04 RX ADMIN — SODIUM CHLORIDE, PRESERVATIVE FREE 10 ML: 5 INJECTION INTRAVENOUS at 18:51

## 2022-09-04 RX ADMIN — VANCOMYCIN HYDROCHLORIDE 2000 MG: 1 INJECTION, POWDER, LYOPHILIZED, FOR SOLUTION INTRAVENOUS at 20:02

## 2022-09-04 RX ADMIN — SODIUM CHLORIDE 80 ML: 9 INJECTION, SOLUTION INTRAVENOUS at 18:51

## 2022-09-04 RX ADMIN — SODIUM CHLORIDE 1000 ML: 9 INJECTION, SOLUTION INTRAVENOUS at 18:29

## 2022-09-04 RX ADMIN — OXYCODONE AND ACETAMINOPHEN 2 TABLET: 5; 325 TABLET ORAL at 22:40

## 2022-09-04 RX ADMIN — CLONIDINE HYDROCHLORIDE 0.2 MG: 0.2 TABLET ORAL at 22:53

## 2022-09-04 RX ADMIN — IOPAMIDOL 75 ML: 755 INJECTION, SOLUTION INTRAVENOUS at 18:51

## 2022-09-04 ASSESSMENT — PAIN DESCRIPTION - FREQUENCY
FREQUENCY: CONTINUOUS

## 2022-09-04 ASSESSMENT — PAIN DESCRIPTION - ONSET
ONSET: ON-GOING
ONSET: PROGRESSIVE
ONSET: ON-GOING

## 2022-09-04 ASSESSMENT — PAIN DESCRIPTION - LOCATION
LOCATION: GROIN;OTHER (COMMENT)
LOCATION: GROIN
LOCATION_2: OTHER (COMMENT)
LOCATION: SCROTUM

## 2022-09-04 ASSESSMENT — PAIN SCALES - GENERAL
PAINLEVEL_OUTOF10: 6
PAINLEVEL_OUTOF10: 9
PAINLEVEL_OUTOF10: 7

## 2022-09-04 ASSESSMENT — PAIN DESCRIPTION - PAIN TYPE
TYPE: ACUTE PAIN

## 2022-09-04 ASSESSMENT — PAIN DESCRIPTION - ORIENTATION
ORIENTATION: LEFT
ORIENTATION: LEFT
ORIENTATION_2: LEFT
ORIENTATION: LEFT

## 2022-09-04 ASSESSMENT — PAIN DESCRIPTION - DESCRIPTORS
DESCRIPTORS: THROBBING

## 2022-09-04 ASSESSMENT — PAIN - FUNCTIONAL ASSESSMENT
PAIN_FUNCTIONAL_ASSESSMENT: 0-10
PAIN_FUNCTIONAL_ASSESSMENT: ACTIVITIES ARE NOT PREVENTED
PAIN_FUNCTIONAL_ASSESSMENT: ACTIVITIES ARE NOT PREVENTED

## 2022-09-04 NOTE — ED PROVIDER NOTES
[facility]      Pt Name: Santos Rust  MRN: [id number]  [place] 1980  Date ofevaluation: 9/4/2022  Provider: Hayden Perez PA-C    CHIEF COMPLAINT       Chief Complaint   Patient presents with    Skin Problem         HISTORY OF PRESENT ILLNESS  (Location/Symptom, Timing/Onset, Context/Setting, Quality, Duration, Modifying Factors, Severity.)   Santos Rust is a 39 y.o. male who presents to the emergency department with left buttock pain. Reports dealing with MRSA over the last 6 months. Recently placed on doxycycline about 3 to 4 days ago for the same problem with the pain to his buttock. Pain has worsened. Hurts to sit. Denies any concerns of STDs. Also reports some small pinpoint areas of pain on her scrotum. Nursing Notes were reviewed. ALLERGIES     Patient has no known allergies. CURRENT MEDICATIONS       Current Discharge Medication List        CONTINUE these medications which have NOT CHANGED    Details   hydrOXYzine HCl (ATARAX) 25 MG tablet Take 1 tablet by mouth every 6 hours as needed for Itching  Qty: 20 tablet, Refills: 0      cloNIDine (CATAPRES) 0.2 MG tablet Take 1 tablet by mouth 2 times daily  Qty: 60 tablet, Refills: 5    Associated Diagnoses: Generalized anxiety disorder      escitalopram (LEXAPRO) 20 MG tablet Take 1 tablet by mouth daily  Qty: 30 tablet, Refills: 5    Associated Diagnoses: Generalized anxiety disorder; Moderate episode of recurrent major depressive disorder (HCC)      amphetamine-dextroamphetamine (ADDERALL XR) 30 MG extended release capsule Take 1 capsule by mouth daily for 30 days.   Qty: 30 capsule, Refills: 0    Associated Diagnoses: Attention deficit hyperactivity disorder (ADHD), unspecified ADHD type      diphenhydrAMINE (BENADRYL) 25 MG capsule Take 1 capsule by mouth every 6 hours as needed for Itching  Qty: 20 capsule, Refills: 0             PAST MEDICAL HISTORY         Diagnosis Date    ADHD (attention deficit hyperactivity disorder)     Depression        SURGICAL HISTORY           Procedure Laterality Date    ADENOIDECTOMY AND TYMPANOSTOMY TUBE PLACEMENT      FACIAL COSMETIC SURGERY           FAMILY HISTORY           Problem Relation Age of Onset    Rheum Arthritis Mother     No Known Problems Father     No Known Problems Sister     No Known Problems Brother      Family Status   Relation Name Status    Mother      Father  (Not Specified)    Sister  (Not Specified)    Brother  (Not Specified)        SOCIAL HISTORY      reports that he has never smoked. He has never used smokeless tobacco. He reports that he does not drink alcohol and does not use drugs. REVIEW OFSYSTEMS    (2-9 systems for level 4, 10 or more for level 5)   Review of Systems    Except as noted above the remainder of the review of systems was reviewed and negative. PHYSICAL EXAM    (up to 7 for level 4, 8 or more for level 5)     ED Triage Vitals [22 1747]   BP Temp Temp Source Heart Rate Resp SpO2 Height Weight   (!) 158/78 98.2 °F (36.8 °C) Oral (!) 114 20 97 % -- 170 lb 11.2 oz (77.4 kg)      Physical Exam  Constitutional:       Appearance: He is well-developed. HENT:      Head: Normocephalic and atraumatic. Cardiovascular:      Rate and Rhythm: Normal rate and regular rhythm. Pulmonary:      Effort: Pulmonary effort is normal.      Breath sounds: Normal breath sounds. Abdominal:      Palpations: Abdomen is soft. Genitourinary:      Musculoskeletal:         General: Normal range of motion. Cervical back: Normal range of motion and neck supple. Skin:     General: Skin is warm. Neurological:      Mental Status: He is alert and oriented to person, place, and time.    Psychiatric:         Behavior: Behavior normal.               DIAGNOSTIC RESULTS     EKG: All EKG's are interpreted by the Emergency Department Physician who either signs or Co-signs this chart in the absence of a cardiologist.        RADIOLOGY:   Non-plain film images such as CT, Ultrasound and MRI are read by the radiologist. Plain radiographic images arevisualized and preliminarily interpreted by the emergency physician with the below findings:        Interpretation per the Radiologist below, if available at thetime of this note:          ED BEDSIDE ULTRASOUND:   Performed by ED Physician - none    LABS:  Labs Reviewed   CBC WITH AUTO DIFFERENTIAL - Abnormal; Notable for the following components:       Result Value    WBC 14.4 (*)     Hematocrit 39.7 (*)     Seg Neutrophils 79 (*)     Lymphocytes 10 (*)     Eosinophils % 0 (*)     Segs Absolute 11.38 (*)     Absolute Mono # 1.58 (*)     All other components within normal limits   BASIC METABOLIC PANEL - Abnormal; Notable for the following components:    Sodium 134 (*)     All other components within normal limits   CULTURE, BLOOD 1   CULTURE, BLOOD 2   GRAM STAIN   CULTURE, WOUND   LACTATE, SEPSIS   LACTATE, SEPSIS   HERPES SIMPLEX 1 & 2, MOLECULAR   BASIC METABOLIC PANEL W/ REFLEX TO MG FOR LOW K   HEMOGLOBIN A1C   CBC   PROCALCITONIN       All other labs were within normal range or not returned as of this dictation.     EMERGENCY DEPARTMENT COURSE and DIFFERENTIAL DIAGNOSIS/MDM:   Vitals:    Vitals:    09/04/22 1747 09/04/22 2044 09/04/22 2159 09/04/22 2245   BP: (!) 158/78 113/62 118/70    Pulse: (!) 114 92 91    Resp: 20 16 17    Temp: 98.2 °F (36.8 °C)  98.1 °F (36.7 °C)    TempSrc: Oral  Oral    SpO2: 97% 96% 99%    Weight: 170 lb 11.2 oz (77.4 kg)   173 lb 14.4 oz (78.9 kg)   Height:    5' 9\" (1.753 m)   1)  Sepsis Identified at Time:      1919       2)  Sepsis Alert Protocol Guidelines:  Blood Cultures drawn before antibiotics  Broad Spectrum Antibiotics given stat   Lactic Acid Q2 hours times 2 occurrences (if first lactic acid > 2.0)    3)  Fluid Bolus Guidelines:    If lactate > 4.0   OR   MAP less than 65  OR  systolic BP < 90 (two separate readings), then 30ml/kg crystalloid fluid bolus infused, and may give over 4 hour duration. Is the patient Obese (BMI > 30): Body mass index is 25.68 kg/m². Ideal body weight: 70.7 kg (155 lb 13.8 oz)  Adjusted ideal body weight: 74 kg (163 lb 1.3 oz)    If Obese the Ideal Body  (Precious formula): Ideal body weight (IBW) (men) = 50 kg + 2.3 kg x (height, inches - 60)   Ideal body weight (IBW) (women) = 45.5 kg + 2.3 kg x (height, inches - 60)    4)  Repeat Sepsis Exam completed during fluid bolus at time:     2000        5)  Vasopressors: For persistent hypotension after completion of 30ml/kg fluid bolus (need to measure BP Q 15 min in the hour after completion of fluid bolus). Discuss risks and benefits of vasopressors and alternative therapies with patient and/or DPOA. Patient is not requiring 30 cc kg Bolus. Lactic acid within normal limits and no hypotension. Patient meets SIRS criteria. Cover with antibiotics. Cellulitis is a source of infection. CONSULTS:  PHARMACY TO DOSE VANCOMYCIN  IP CONSULT TO HOSPITALIST  PHARMACY TO DOSE VANCOMYCIN    PROCEDURES:  Procedures    CRITICAL CARE TIME     Due to the high probability of sudden and clinically significant deterioration in the patient's condition he required highest level of my preparedness to intervene urgently. I provided critical care time including documentation time, medication orders and management, reevaluation, vital sign assessment, ordering and reviewing of of lab tests ordering and reviewing of x-ray studies, and admission orders. Aggregate critical care time is between 35  minutes including only time during which I was engaged in work directly related to his care and did not include time spent treating other patients simultaneously. FINAL IMPRESSION      1.  Cellulitis of buttock          DISPOSITION/PLAN   DISPOSITION Admitted 09/04/2022 08:37:13 PM      PATIENTREFERRED TO:   No follow-up provider specified.     DISCHARGE MEDICATIONS:     Current Discharge Medication List              (Please note that portions of this note were completed with a voice recognition program.  Efforts were made to edit thedictations but occasionally words are mis-transcribed.)    KARIME Starks PA-C  09/04/22 0902

## 2022-09-04 NOTE — CONSULTS
Pharmacy dosing of initial vancomycin ordered by ED provider. 2000 mg ordered x 1. Pharmacy is waiting to see if vancomycin therapy is continued or stopped/changed by the admitting physician. Riley Alcala  9/4/2022  7:44 PM

## 2022-09-05 PROBLEM — L08.9 RECURRENT INFECTION OF SKIN: Status: ACTIVE | Noted: 2022-09-05

## 2022-09-05 PROBLEM — R23.8 VESICULAR SKIN LESIONS: Status: ACTIVE | Noted: 2022-09-05

## 2022-09-05 PROBLEM — N50.9 SKIN LESION OF SCROTUM: Status: ACTIVE | Noted: 2022-09-05

## 2022-09-05 PROBLEM — N49.2: Status: ACTIVE | Noted: 2022-09-05

## 2022-09-05 LAB
AMPHETAMINE SCREEN URINE: NEGATIVE
ANION GAP SERPL CALCULATED.3IONS-SCNC: 7 MMOL/L (ref 9–17)
BARBITURATE SCREEN URINE: NEGATIVE
BENZODIAZEPINE SCREEN, URINE: NEGATIVE
BUN BLDV-MCNC: 12 MG/DL (ref 6–20)
BUN/CREAT BLD: 14 (ref 9–20)
CALCIUM SERPL-MCNC: 8.6 MG/DL (ref 8.6–10.4)
CANNABINOID SCREEN URINE: POSITIVE
CHLORIDE BLD-SCNC: 105 MMOL/L (ref 98–107)
CO2: 25 MMOL/L (ref 20–31)
COCAINE METABOLITE, URINE: NEGATIVE
CREAT SERPL-MCNC: 0.84 MG/DL (ref 0.7–1.2)
FENTANYL URINE: NEGATIVE
GFR AFRICAN AMERICAN: >60 ML/MIN
GFR NON-AFRICAN AMERICAN: >60 ML/MIN
GFR SERPL CREATININE-BSD FRML MDRD: ABNORMAL ML/MIN/{1.73_M2}
GLUCOSE BLD-MCNC: 107 MG/DL (ref 70–99)
HCT VFR BLD CALC: 37.7 % (ref 40.7–50.3)
HEMOGLOBIN: 12 G/DL (ref 13–17)
HSV 1, NAAT: NEGATIVE
HSV 2, NAAT: NEGATIVE
MCH RBC QN AUTO: 29.5 PG (ref 25.2–33.5)
MCHC RBC AUTO-ENTMCNC: 31.8 G/DL (ref 28.4–34.8)
MCV RBC AUTO: 92.6 FL (ref 82.6–102.9)
METHADONE SCREEN, URINE: NEGATIVE
NRBC AUTOMATED: 0 PER 100 WBC
OPIATES, URINE: NEGATIVE
OXYCODONE SCREEN URINE: POSITIVE
PDW BLD-RTO: 13.1 % (ref 11.8–14.4)
PHENCYCLIDINE, URINE: NEGATIVE
PLATELET # BLD: 156 K/UL (ref 138–453)
PMV BLD AUTO: 9.8 FL (ref 8.1–13.5)
POTASSIUM SERPL-SCNC: 3.9 MMOL/L (ref 3.7–5.3)
PROCALCITONIN: <0.02 NG/ML
RBC # BLD: 4.07 M/UL (ref 4.21–5.77)
SODIUM BLD-SCNC: 137 MMOL/L (ref 135–144)
SPECIMEN DESCRIPTION: NORMAL
TEST INFORMATION: ABNORMAL
VANCOMYCIN RANDOM: 12.2 UG/ML
WBC # BLD: 11.3 K/UL (ref 3.5–11.3)

## 2022-09-05 PROCEDURE — 6370000000 HC RX 637 (ALT 250 FOR IP): Performed by: NURSE PRACTITIONER

## 2022-09-05 PROCEDURE — 6360000002 HC RX W HCPCS: Performed by: FAMILY MEDICINE

## 2022-09-05 PROCEDURE — 85027 COMPLETE CBC AUTOMATED: CPT

## 2022-09-05 PROCEDURE — 2580000003 HC RX 258: Performed by: NURSE PRACTITIONER

## 2022-09-05 PROCEDURE — 80202 ASSAY OF VANCOMYCIN: CPT

## 2022-09-05 PROCEDURE — 2580000003 HC RX 258: Performed by: FAMILY MEDICINE

## 2022-09-05 PROCEDURE — 1200000000 HC SEMI PRIVATE

## 2022-09-05 PROCEDURE — 6360000002 HC RX W HCPCS: Performed by: NURSE PRACTITIONER

## 2022-09-05 PROCEDURE — 99232 SBSQ HOSP IP/OBS MODERATE 35: CPT | Performed by: FAMILY MEDICINE

## 2022-09-05 PROCEDURE — 80048 BASIC METABOLIC PNL TOTAL CA: CPT

## 2022-09-05 PROCEDURE — 80307 DRUG TEST PRSMV CHEM ANLYZR: CPT

## 2022-09-05 PROCEDURE — 36415 COLL VENOUS BLD VENIPUNCTURE: CPT

## 2022-09-05 PROCEDURE — 6370000000 HC RX 637 (ALT 250 FOR IP): Performed by: FAMILY MEDICINE

## 2022-09-05 RX ORDER — DEXTROAMPHETAMINE SACCHARATE, AMPHETAMINE ASPARTATE MONOHYDRATE, DEXTROAMPHETAMINE SULFATE AND AMPHETAMINE SULFATE 7.5; 7.5; 7.5; 7.5 MG/1; MG/1; MG/1; MG/1
30 CAPSULE, EXTENDED RELEASE ORAL DAILY
Status: DISCONTINUED | OUTPATIENT
Start: 2022-09-05 | End: 2022-09-07

## 2022-09-05 RX ORDER — OXYCODONE HCL 10 MG/1
10 TABLET, FILM COATED, EXTENDED RELEASE ORAL EVERY 12 HOURS SCHEDULED
Status: DISCONTINUED | OUTPATIENT
Start: 2022-09-05 | End: 2022-09-07 | Stop reason: HOSPADM

## 2022-09-05 RX ORDER — TRAMADOL HYDROCHLORIDE 50 MG/1
50 TABLET ORAL EVERY 4 HOURS PRN
Status: DISCONTINUED | OUTPATIENT
Start: 2022-09-05 | End: 2022-09-07 | Stop reason: HOSPADM

## 2022-09-05 RX ADMIN — KETOROLAC TROMETHAMINE 30 MG: 30 INJECTION, SOLUTION INTRAMUSCULAR; INTRAVENOUS at 14:07

## 2022-09-05 RX ADMIN — VANCOMYCIN HYDROCHLORIDE 1000 MG: 1 INJECTION, POWDER, LYOPHILIZED, FOR SOLUTION INTRAVENOUS at 07:44

## 2022-09-05 RX ADMIN — PIPERACILLIN AND TAZOBACTAM 3375 MG: 3; .375 INJECTION, POWDER, FOR SOLUTION INTRAVENOUS at 11:26

## 2022-09-05 RX ADMIN — PROBIOTIC PRODUCT - TAB 1 TABLET: TAB at 17:09

## 2022-09-05 RX ADMIN — TRAMADOL HYDROCHLORIDE 50 MG: 50 TABLET, COATED ORAL at 21:47

## 2022-09-05 RX ADMIN — KETOROLAC TROMETHAMINE 30 MG: 30 INJECTION, SOLUTION INTRAMUSCULAR; INTRAVENOUS at 20:13

## 2022-09-05 RX ADMIN — OXYCODONE HYDROCHLORIDE 10 MG: 10 TABLET, FILM COATED, EXTENDED RELEASE ORAL at 14:07

## 2022-09-05 RX ADMIN — OXYCODONE AND ACETAMINOPHEN 2 TABLET: 5; 325 TABLET ORAL at 11:24

## 2022-09-05 RX ADMIN — KETOROLAC TROMETHAMINE 30 MG: 30 INJECTION, SOLUTION INTRAMUSCULAR; INTRAVENOUS at 05:55

## 2022-09-05 RX ADMIN — HYDROXYZINE HYDROCHLORIDE 25 MG: 25 TABLET, FILM COATED ORAL at 21:47

## 2022-09-05 RX ADMIN — PIPERACILLIN AND TAZOBACTAM 3375 MG: 3; .375 INJECTION, POWDER, FOR SOLUTION INTRAVENOUS at 01:53

## 2022-09-05 RX ADMIN — OXYCODONE AND ACETAMINOPHEN 2 TABLET: 5; 325 TABLET ORAL at 02:40

## 2022-09-05 RX ADMIN — OXYCODONE AND ACETAMINOPHEN 2 TABLET: 5; 325 TABLET ORAL at 07:23

## 2022-09-05 RX ADMIN — SODIUM CHLORIDE: 9 INJECTION, SOLUTION INTRAVENOUS at 14:10

## 2022-09-05 RX ADMIN — PROBIOTIC PRODUCT - TAB 1 TABLET: TAB at 07:43

## 2022-09-05 RX ADMIN — OXYCODONE AND ACETAMINOPHEN 2 TABLET: 5; 325 TABLET ORAL at 17:09

## 2022-09-05 RX ADMIN — VANCOMYCIN HYDROCHLORIDE 1250 MG: 5 INJECTION, POWDER, LYOPHILIZED, FOR SOLUTION INTRAVENOUS at 20:17

## 2022-09-05 RX ADMIN — CLONIDINE HYDROCHLORIDE 0.2 MG: 0.2 TABLET ORAL at 20:13

## 2022-09-05 RX ADMIN — PROBIOTIC PRODUCT - TAB 1 TABLET: TAB at 11:24

## 2022-09-05 ASSESSMENT — PAIN DESCRIPTION - LOCATION
LOCATION: BUTTOCKS;GROIN
LOCATION: GROIN;OTHER (COMMENT)
LOCATION: BUTTOCKS
LOCATION: BUTTOCKS
LOCATION: ARM;SCROTUM

## 2022-09-05 ASSESSMENT — PAIN DESCRIPTION - PAIN TYPE
TYPE: ACUTE PAIN

## 2022-09-05 ASSESSMENT — PAIN DESCRIPTION - ORIENTATION
ORIENTATION: LEFT

## 2022-09-05 ASSESSMENT — PAIN DESCRIPTION - DESCRIPTORS
DESCRIPTORS: ACHING
DESCRIPTORS: ACHING
DESCRIPTORS: THROBBING
DESCRIPTORS: ACHING
DESCRIPTORS: THROBBING
DESCRIPTORS: ACHING
DESCRIPTORS: DISCOMFORT;STABBING

## 2022-09-05 ASSESSMENT — PAIN DESCRIPTION - ONSET
ONSET: ON-GOING

## 2022-09-05 ASSESSMENT — ENCOUNTER SYMPTOMS
ABDOMINAL PAIN: 0
NAUSEA: 0
WHEEZING: 0
DIARRHEA: 0
VOMITING: 0
BLOOD IN STOOL: 0
COLOR CHANGE: 1
ANAL BLEEDING: 1
CHEST TIGHTNESS: 0
RESPIRATORY NEGATIVE: 1
SHORTNESS OF BREATH: 0
COUGH: 0
EYES NEGATIVE: 1
RECTAL PAIN: 1
CONSTIPATION: 0

## 2022-09-05 ASSESSMENT — PAIN SCALES - GENERAL
PAINLEVEL_OUTOF10: 8
PAINLEVEL_OUTOF10: 9
PAINLEVEL_OUTOF10: 8
PAINLEVEL_OUTOF10: 6
PAINLEVEL_OUTOF10: 8
PAINLEVEL_OUTOF10: 10
PAINLEVEL_OUTOF10: 8
PAINLEVEL_OUTOF10: 8
PAINLEVEL_OUTOF10: 5
PAINLEVEL_OUTOF10: 8

## 2022-09-05 ASSESSMENT — PAIN - FUNCTIONAL ASSESSMENT
PAIN_FUNCTIONAL_ASSESSMENT: PREVENTS OR INTERFERES SOME ACTIVE ACTIVITIES AND ADLS
PAIN_FUNCTIONAL_ASSESSMENT: ACTIVITIES ARE NOT PREVENTED

## 2022-09-05 ASSESSMENT — PAIN DESCRIPTION - FREQUENCY
FREQUENCY: CONTINUOUS

## 2022-09-05 NOTE — H&P
[facility]  Office: [address], DO, Kelsi Velasquez, DO, Susi Davis, DO, Donald Moffett, DO, Lexus Declid MD, Jesse Blakely MD, Allison Hernandez MD, Vianey Rucker MD,  [address] MD, Lanre John MD, Grayson Jeff, DO, Lee Dodson MD,  Prabha Ingram MD, Nolan Bragg MD, Blanca Hull DO, Roger García MD, Rohit Macias MD, Eden Barrera MD, Candi Paul MD, José Noguera MD, Bozena Mcelroy MD, Fouzia Lebron DO, Lorrie Johnson MD, Alpesh Sargent MD, Alferd Felty, CNP,  Maru Lind, CNP, Ethyl Mercury, CNP, Aleena Alberts, CNP, Viviane Colbert, PA-C, Nalini Roberto, DNP, Ruby Hicks, CNP, [place], CNP, Mauricio Brittle, CNP, Maren Ballard, CNP, Fozia Esquivel, CNP, Miesha Frias, CNS, Gurvinder Lopez, DNP, Rocky Camacho, CNP, Viviane Cr, CNP, Katia Nava, [facility]    HISTORY AND PHYSICAL EXAMINATION            Date:   9/5/2022  Patient name:  Guilherme Flores  Date of admission:  9/4/2022  5:46 PM  MRN:   [id number]  Account:  [de-identified]  YOB: 1980  PCP:    Annamaria Nielsen MD  Room:   2018/2018-02  Code Status:    Full Code    Chief Complaint:     Chief Complaint   Patient presents with    Skin Problem       History Obtained From:     patient    History of Present Illness:     Guilherme Flores is a 39 y.o. Non- / non  male who presents with Skin Problem   and is admitted to the hospital for the management of Cellulitis. Patient developed painful and red areas to his left axilla, left side of his scrotum, and left buttock. He says he has a skin condition the predisposes him to skin abscesses. He went to an Urgent Care on Thursday and was given doxycycline that he took for 5 days without any improvement in the blisters/ lesions. He was taking Aleve for the pain, but it did not help.  He denies hx of DM , HIV, and autoimmune problems. While in the ED, CT of the pelvis showed a left gluteal cellulitis, no abscess seen. WBC 14.4. Blood cultures were drawn and he was started on IV Zosyn and IV Vancomycin. He is being admitted for further management of cellulitis. Past Medical History:     Past Medical History:   Diagnosis Date    ADHD (attention deficit hyperactivity disorder)     Depression         Past Surgical History:     Past Surgical History:   Procedure Laterality Date    ADENOIDECTOMY AND TYMPANOSTOMY TUBE PLACEMENT      FACIAL COSMETIC SURGERY          Medications Prior to Admission:     Prior to Admission medications    Medication Sig Start Date End Date Taking? Authorizing Provider   hydrOXYzine HCl (ATARAX) 25 MG tablet Take 1 tablet by mouth every 6 hours as needed for Itching 7/28/22   JANESSA Nash CNP   cloNIDine (CATAPRES) 0.2 MG tablet Take 1 tablet by mouth 2 times daily  Patient taking differently: Take 0.2 mg by mouth 2 times daily Patient takes for ADHD - states usually takes at night 6/8/22   Amisha Goff MD   escitalopram (LEXAPRO) 20 MG tablet Take 1 tablet by mouth daily  Patient not taking: Reported on 9/4/2022 6/8/22   Amisha Goff MD   amphetamine-dextroamphetamine (ADDERALL XR) 30 MG extended release capsule Take 1 capsule by mouth daily for 30 days. 7/8/22 8/7/22  Amisha Goff MD   amphetamine-dextroamphetamine (ADDERALL XR) 30 MG extended release capsule Take 1 capsule by mouth daily for 30 days. Patient not taking: Reported on 9/4/2022 8/8/22 9/7/22  Amisha Goff MD   diphenhydrAMINE (BENADRYL) 25 MG capsule Take 1 capsule by mouth every 6 hours as needed for Itching  Patient not taking: Reported on 6/8/2022 5/25/22   JANESSA Portillo CNP        Allergies:     Patient has no known allergies. Social History:     Tobacco:    reports that he has never smoked. He has never used smokeless tobacco.  Alcohol:      reports no history of alcohol use.   Drug Use:  reports no history of drug use. Family History:     Family History   Problem Relation Age of Onset    Rheum Arthritis Mother     Depression Mother     No Known Problems Father     No Known Problems Sister     No Known Problems Brother        Review of Systems:     Positive and Negative as described in HPI. Review of Systems   Constitutional: Negative. HENT: Negative. Eyes: Negative. Respiratory: Negative. Cardiovascular: Negative. Gastrointestinal:  Positive for anal bleeding and rectal pain. Negative for abdominal pain, constipation, diarrhea, nausea and vomiting. Genitourinary:  Positive for genital sores and testicular pain. Negative for difficulty urinating, penile swelling and scrotal swelling. Musculoskeletal: Negative. Skin:  Positive for color change and rash. Negative for pallor. Neurological: Negative. Psychiatric/Behavioral: Negative. Physical Exam:   /70   Pulse 91   Temp 98.1 °F (36.7 °C) (Oral)   Resp 17   Ht 5' 9\" (1.753 m)   Wt 173 lb 14.4 oz (78.9 kg)   SpO2 99%   BMI 25.68 kg/m²   Temp (24hrs), Av.2 °F (36.8 °C), Min:98.1 °F (36.7 °C), Max:98.2 °F (36.8 °C)    No results for input(s): POCGLU in the last 72 hours. Intake/Output Summary (Last 24 hours) at 2022 0327  Last data filed at 2022  Gross per 24 hour   Intake 1180 ml   Output --   Net 1180 ml       Physical Exam  Vitals and nursing note reviewed. Constitutional:       General: He is in acute distress. Appearance: He is not ill-appearing, toxic-appearing or diaphoretic. HENT:      Head: Normocephalic and atraumatic. Right Ear: External ear normal.      Left Ear: External ear normal.      Nose: Nose normal. No rhinorrhea. Mouth/Throat:      Mouth: Mucous membranes are moist.   Eyes:      General: No scleral icterus. Right eye: No discharge. Left eye: No discharge. Extraocular Movements: Extraocular movements intact.       Conjunctiva/sclera: Conjunctivae normal.      Pupils: Pupils are equal, round, and reactive to light. Cardiovascular:      Rate and Rhythm: Normal rate and regular rhythm. Pulses: Normal pulses. Heart sounds: Normal heart sounds. No murmur heard. No friction rub. No gallop. Pulmonary:      Effort: Pulmonary effort is normal. No respiratory distress. Breath sounds: Normal breath sounds. No wheezing, rhonchi or rales. Abdominal:      General: There is no distension. Palpations: Abdomen is soft. Tenderness: There is no abdominal tenderness. There is no guarding. Hernia: No hernia is present. Comments: Hypoactive bowel sounds   Musculoskeletal:         General: Normal range of motion. Cervical back: Normal range of motion and neck supple. Right lower leg: No edema. Left lower leg: No edema. Skin:     General: Skin is warm. Coloration: Skin is not jaundiced or pale. Findings: Erythema and lesion present. No bruising or rash. Neurological:      General: No focal deficit present. Mental Status: He is alert. Mental status is at baseline. He is disoriented. Psychiatric:         Mood and Affect: Mood normal.         Behavior: Behavior normal.         Thought Content:  Thought content normal.         Judgment: Judgment normal.       Investigations:      Laboratory Testing:  Recent Results (from the past 24 hour(s))   CBC with Auto Differential    Collection Time: 09/04/22  6:31 PM   Result Value Ref Range    WBC 14.4 (H) 3.5 - 11.3 k/uL    RBC 4.38 4.21 - 5.77 m/uL    Hemoglobin 13.0 13.0 - 17.0 g/dL    Hematocrit 39.7 (L) 40.7 - 50.3 %    MCV 90.6 82.6 - 102.9 fL    MCH 29.7 25.2 - 33.5 pg    MCHC 32.7 28.4 - 34.8 g/dL    RDW 13.1 11.8 - 14.4 %    Platelets 044 348 - 584 k/uL    MPV 10.1 8.1 - 13.5 fL    NRBC Automated 0.0 0.0 per 100 WBC    Seg Neutrophils 79 (H) 36 - 65 %    Lymphocytes 10 (L) 24 - 43 %    Monocytes 11 3 - 12 %    Eosinophils % 0 (L) 1 - 4 % Basophils 0 0 - 2 %    Immature Granulocytes 0 0 %    Segs Absolute 11.38 (H) 1.50 - 8.10 k/uL    Absolute Lymph # 1.44 1.10 - 3.70 k/uL    Absolute Mono # 1.58 (H) 0.10 - 1.20 k/uL    Absolute Eos # 0.00 0.00 - 0.44 k/uL    Basophils Absolute 0.00 0.00 - 0.20 k/uL    Absolute Immature Granulocyte 0.00 0.00 - 0.30 k/uL   Basic Metabolic Panel    Collection Time: 09/04/22  6:31 PM   Result Value Ref Range    Glucose 98 70 - 99 mg/dL    BUN 14 6 - 20 mg/dL    Creatinine 0.83 0.70 - 1.20 mg/dL    Bun/Cre Ratio 17 9 - 20    Calcium 8.9 8.6 - 10.4 mg/dL    Sodium 134 (L) 135 - 144 mmol/L    Potassium 4.0 3.7 - 5.3 mmol/L    Chloride 99 98 - 107 mmol/L    CO2 25 20 - 31 mmol/L    Anion Gap 10 9 - 17 mmol/L    GFR Non-African American >60 >60 mL/min    GFR African American >60 >60 mL/min    GFR Comment         Lactate, Sepsis    Collection Time: 09/04/22  6:31 PM   Result Value Ref Range    Lactic Acid, Sepsis 0.6 0.5 - 1.9 mmol/L   Culture, Blood 1    Collection Time: 09/04/22  6:31 PM    Specimen: Blood   Result Value Ref Range    Specimen Description . BLOOD     Special Requests RAC 10ML     Culture NO GROWTH <24 HRS    Culture, Blood 2    Collection Time: 09/04/22  6:43 PM    Specimen: Blood   Result Value Ref Range    Specimen Description . BLOOD     Special Requests 1ML R HAND     Culture NO GROWTH <24 HRS    Lactate, Sepsis    Collection Time: 09/04/22  8:25 PM   Result Value Ref Range    Lactic Acid, Sepsis 0.5 0.5 - 1.9 mmol/L       Imaging/Diagnostics:  CT PELVIS W CONTRAST Additional Contrast? None    Result Date: 9/4/2022  Medial left gluteal region skin thickening and subcutaneous edema compatible cellulitis. No soft tissue fluid collection or soft tissue gas to suggest abscess.        Assessment :      Hospital Problems             Last Modified POA    * (Principal) Cellulitis 9/4/2022 Yes    Skin lesion of scrotum 9/5/2022 Yes    Vesicular skin lesions 9/5/2022 Yes       Plan:     Patient status inpatient in the  Med/Surge    Cellulitis: Trend WBC. IV Vanco and IV Zosyn as ordered. IV hydration as ordered. Check A1c  Skin lesion of scrotum. IV atb. Monitor for resolution of lesion. Trend WBC. Pain control. Vesicular skin lesions: Lesion noted to left axilla. IV atb as above, monitor for resolution. DVT prophylaxis    Consultations:   PHARMACY TO DOSE VANCOMYCIN  IP CONSULT TO HOSPITALIST  PHARMACY TO DOSE VANCOMYCIN     Patient is admitted as inpatient status because of co-morbidities listed above, severity of signs and symptoms as outlined, requirement for current medical therapies and most importantly because of direct risk to patient if care not provided in a hospital setting. Expected length of stay > 48 hours.     JANESSA Sarah NP  9/5/2022  3:27 AM    Copy sent to Dr. Haider Rivas MD

## 2022-09-05 NOTE — FLOWSHEET NOTE
Pt admitted to room 2018 from ED  Oriented to room and surroundings  Bed in lowest position, wheels locked, 2/4 side rails up  Call light in reach, room free of clutter, adequate lighting provided  Denies any further questions at this time  Instructed to call out with any questions/concerns/new onset of pain and/or n/v   White board updated  Continue to monitor with hourly rounding

## 2022-09-05 NOTE — ED NOTES
ED to inpatient nurses report     Chief Complaint   Patient presents with    Skin Problem      Present to ED from home c/o flare up of rash/leisons from under axilla & genitals area. Pt reports that x6 months ago he had MRSA & reports that he was hospitalized & treated w/ axb. Pt reports pain,but no open leisions or drainage. Pt denies NVD at this time. Pt reports that he went to  on Thursday & was prescribed doxycycline; medication is not helping.   LOC: alert and orientated to name, place, date  Vital signs   Vitals:    09/04/22 1747   BP: (!) 158/78   Pulse: (!) 114   Resp: 20   Temp: 98.2 °F (36.8 °C)   TempSrc: Oral   SpO2: 97%   Weight: 170 lb 11.2 oz (77.4 kg)      Oxygen Baseline RA    Current needs required n/a   LDAs:   Peripheral IV 09/04/22 Right Antecubital (Active)     Mobility: Independent  Fall Risk:    Pending ED orders: n/a  Present condition: stable  Code Status: full  Consults: PHARMACY TO DOSE VANCOMYCIN  IP CONSULT TO HOSPITALIST  []  Hospitalist  Completed  [] yes [] no Who:   []  Medicine  Completed  [] yes [] No Who:   []  Cardiology  Completed  [] yes [] No Who:   []  GI   Completed  [] yes [] No Who:   []  Neurology  Completed  [] yes [] No Who:   []  Nephrology Completed  [] yes [] No Who:    []  Vascular  Completed  [] yes [] No Who:   []  Ortho  Completed  [] yes [] No Who:     []  Surgery  Completed  [] yes [] No Who:    []  Urology  Completed  [] yes [] No Who:    []  CT Surgery Completed  [] yes [] No Who:   []  Podiatry  Completed  [] yes [] No Who:    []  Other    Completed  [] yes [] No Who:  Interventions: IV, labs, axb  Important Events:         Electronically signed by Addie Garcia RN on 9/4/2022 at 8:22 PM       Addie Garcia RN  09/04/22 2024

## 2022-09-05 NOTE — PROGRESS NOTES
[facility]  Office: [address], DO, Lady Suarez, DO, Wilmer Escobedo, DO, Lisalex Adkinser Blood, DO, Bobo Farrar MD, Barbie Lai MD, Yancy Mcbride MD, Alcon Tee MD,  Merlin Alvarado MD, Jahaira Shafer MD, Michael Zimmer, DO, Clovis Huertas MD,  Say Summers MD, Jenny Grande MD, Jael Cuba DO, Kenna Guthrie MD, Romeo Acevedo MD, Selina Richards MD, Collette Hahn, MD, Ernesto Plummer MD, Carlee Thomas MD, Lavonne Peguero DO, Edmundo Whitmore MD, Nubia Lincoln MD, Lady Rodriguez, CNP,  Jennifer Carter, CNP, Juan Manuel Mercer, CNP, Flor Martin, CNP, Jermaine Wilson PAGisellC, Dalton Trevizo, DNP, Anastacio Ulrich, CNP, Arcenio Zarate, CNP, Carmenza Gutierrez, CNP, Livier Olivares, CNP, Maued Fleming, CNP, Freda Patel, CNS, Kimberly Jurado, DNP, Troy Gibson, CNP, Yumiko Dangelo, CNP, Cortez Swenson, [facility]    Progress Note    9/5/2022    12:36 PM    Name:   Amada Foote  MRN:     [id number]     Acct:      [de-identified]   Room:   2018/2018-02  IP Day:  1  Admit Date:  9/4/2022  5:46 PM    PCP:   Haider Rivas MD  Code Status:  Full Code    Subjective:     C/C:   Chief Complaint   Patient presents with    Skin Problem     Interval History Status: not changed. Patient seen and examined at bedside, no acute events overnight. Continue to feel the same, has significant pain and discomfort at his gluteal area, discussed in detail his history and he expressed frustration from his condition and recurrent skin infections   patient vitals, labs and all providers notes were reviewed,from overnight shift and morning updates were noted and discussed with the nurse    Brief History:       Amada Foote is a 39 y.o. Non- / non  male who presents with Skin Problem   and is admitted to the hospital for the management of Cellulitis.      Patient developed painful and red areas to his left axilla, left side of his scrotum, and left buttock. Patient is with multiple visits to ER and urgent cares for skin infections , I did review the chart and patient did have multiple visit with dermatologist, he does not have a predisposing skin condition to infections, he had prurigo nodularis wishes skin condition describes heart nodules and scabs due to excessive scratching, biopsy was felt could be secondary to eczema. Patient feels it is getting very frequent and progressed rapidly, and significant pain and discomfort this time at his gluteal area  He is being admitted for further management of cellulitis. Review of Systems:     Review of Systems   Constitutional:  Positive for activity change. Negative for chills, diaphoresis and fever. HENT:  Negative for congestion. Eyes:  Negative for visual disturbance. Respiratory:  Negative for cough, chest tightness, shortness of breath and wheezing. Cardiovascular:  Negative for chest pain, palpitations and leg swelling. Gastrointestinal:  Negative for abdominal pain, blood in stool, constipation, diarrhea, nausea and vomiting. Genitourinary:  Negative for difficulty urinating. Skin:  Positive for wound. Neurological:  Negative for dizziness, weakness, light-headedness, numbness and headaches. Psychiatric/Behavioral:  Positive for dysphoric mood (Patient starting frustrated from the recurrent skin infections). All other systems reviewed and are negative. Medications:      Allergies:  No Known Allergies    Current Meds:   Scheduled Meds:    cloNIDine  0.2 mg Oral BID    sodium chloride flush  5-40 mL IntraVENous 2 times per day    enoxaparin  40 mg SubCUTAneous Daily    piperacillin-tazobactam  3,375 mg IntraVENous Q8H    lactobacillus  1 tablet Oral TID WC    vancomycin  1,000 mg IntraVENous Q12H    vancomycin (VANCOCIN) intermittent dosing (placeholder)   Other RX Placeholder     Continuous Infusions:    sodium chloride 75 mL/hr at 09/05/22 0274 sodium chloride       PRN Meds: sodium chloride flush, hydrOXYzine HCl, sodium chloride flush, sodium chloride, potassium chloride **OR** potassium alternative oral replacement **OR** potassium chloride, magnesium sulfate, ondansetron **OR** ondansetron, polyethylene glycol, acetaminophen **OR** acetaminophen, oxyCODONE-acetaminophen **OR** oxyCODONE-acetaminophen, ketorolac    Data:     Past Medical History:   has a past medical history of ADHD (attention deficit hyperactivity disorder) and Depression. Social History:   reports that he has never smoked. He has never used smokeless tobacco. He reports that he does not drink alcohol and does not use drugs. Family History:   Family History   Problem Relation Age of Onset    Rheum Arthritis Mother     Depression Mother     No Known Problems Father     No Known Problems Sister     No Known Problems Brother        Vitals:  BP (!) 101/54   Pulse 84   Temp 98.1 °F (36.7 °C) (Oral)   Resp 16   Ht 5' 9\" (1.753 m)   Wt 173 lb 14.4 oz (78.9 kg)   SpO2 96%   BMI 25.68 kg/m²   Temp (24hrs), Av.1 °F (36.7 °C), Min:97.9 °F (36.6 °C), Max:98.2 °F (36.8 °C)    No results for input(s): POCGLU in the last 72 hours. I/O (24Hr):     Intake/Output Summary (Last 24 hours) at 2022 1236  Last data filed at 2022 0544  Gross per 24 hour   Intake 2251.77 ml   Output 500 ml   Net 1751.77 ml       Labs:  Hematology:  Recent Labs     22  0555   WBC 14.4* 11.3   RBC 4.38 4.07*   HGB 13.0 12.0*   HCT 39.7* 37.7*   MCV 90.6 92.6   MCH 29.7 29.5   MCHC 32.7 31.8   RDW 13.1 13.1    156   MPV 10.1 9.8     Chemistry:  Recent Labs     22  18322  0555   * 137   K 4.0 3.9   CL 99 105   CO2 25 25   GLUCOSE 98 107*   BUN 14 12   CREATININE 0.83 0.84   ANIONGAP 10 7*   LABGLOM >60 >60   GFRAA >60 >60   CALCIUM 8.9 8.6   No results for input(s): PROT, LABALBU, LABA1C, O7CKSAC, V9FRNRQ, FT4, TSH, AST, ALT, LDH, GGT, ALKPHOS, LABGGT, BILITOT, BILIDIR, AMMONIA, AMYLASE, LIPASE, LACTATE, CHOL, HDL, LDLCHOLESTEROL, CHOLHDLRATIO, TRIG, VLDL, PRG26OV, PHENYTOIN, PHENYF, URICACID, POCGLU in the last 72 hours. ABG:No results found for: POCPH, PHART, PH, POCPCO2, LLS1SDI, PCO2, POCPO2, PO2ART, PO2, POCHCO3, BGH0JVY, HCO3, NBEA, PBEA, BEART, BE, THGBART, THB, XTD9XLI, RYGN0VRM, G6MWVAOJ, O2SAT, FIO2  Lab Results   Component Value Date/Time    SPECIAL 1ML R HAND 09/04/2022 06:43 PM     Lab Results   Component Value Date/Time    CULTURE NO GROWTH 12 HOURS 09/04/2022 06:43 PM       Radiology:  CT PELVIS W CONTRAST Additional Contrast? None    Result Date: 9/4/2022  Medial left gluteal region skin thickening and subcutaneous edema compatible cellulitis. No soft tissue fluid collection or soft tissue gas to suggest abscess. Physical Examination:        Physical Exam  Vitals and nursing note reviewed. Constitutional:       General: He is not in acute distress. HENT:      Head: Normocephalic and atraumatic. Eyes:      Conjunctiva/sclera: Conjunctivae normal.      Pupils: Pupils are equal, round, and reactive to light. Cardiovascular:      Rate and Rhythm: Normal rate and regular rhythm. Heart sounds: No murmur heard. Pulmonary:      Effort: Pulmonary effort is normal. No accessory muscle usage or respiratory distress. Breath sounds: No stridor. No decreased breath sounds, wheezing, rhonchi or rales. Abdominal:      General: Bowel sounds are normal. There is no distension. Palpations: Abdomen is soft. Abdomen is not rigid. Tenderness: There is no abdominal tenderness. There is no guarding. Musculoskeletal:         General: No tenderness. Skin:     General: Skin is warm and dry. Findings: Erythema and lesion present. No rash. Comments: Please see pics attached here from media     Neurological:      Mental Status: He is alert and oriented to person, place, and time. Cranial Nerves: No cranial nerve deficit. Motor: No seizure activity. Psychiatric:         Speech: Speech normal.         Behavior: Behavior normal. Behavior is cooperative. Assessment:        Hospital Problems             Last Modified POA    * (Principal) Cellulitis 9/4/2022 Yes    Boil, scrotum 9/5/2022 Yes    Recurrent infection of skin 9/5/2022 Yes    Generalized anxiety disorder 9/5/2022 Yes    Moderate episode of recurrent major depressive disorder ([address] 9/5/2022 Yes    Attention deficit hyperactivity disorder (ADHD) 9/5/2022 Yes       Plan:        Gluteal cellulitis, scrotal skin boils and left axillary abscess: -Patient with known recurrent skin infections, seen dermatology in the past.  -I did discuss the case with Dr. Denise Mathis, patient biopsy did show irritation for possible eczema.   She was seen in a formal virtual consult later today  -Patient did have right hand infection that grew MRSA in June 2022.  -Patient does not have predisposing skin disorder.  -At this point possibilities of recurrent infections growing staph might be bacteremia, IV drug use versus immunodeficiency  -Await blood culture  -For now continue on vancomycin.  -Get ID input.  -His gluteal cellulitis might evolve into an abscess, if so will need surgery consultation  -Pain control      ADHD: Continue home medication, noted patient takes clonidine for his ADHD but given low blood pressure will hold for now    Depression: discontinue Lexapro, patient reports not taking    Discussed with the patient and the nurse        Nava Corbin MD  9/5/2022  12:36 PM

## 2022-09-05 NOTE — CONSULTS
[address] [facility] Pharmacokinetic Monitoring Service - Vancomycin     Guilherme Flores is a 39 y.o. male starting on vancomycin therapy for SSTI. Pharmacy consulted by JANESSA Lopez CNP  for monitoring and adjustment. Target Concentration: Goal trough of 10-15 mg/L and AUC/ABEBE <500 mg*hr/L    Additional Antimicrobials: Zosyn    Pertinent Laboratory Values: Wt Readings from Last 1 Encounters:   09/04/22 170 lb 11.2 oz (77.4 kg)     Temp Readings from Last 1 Encounters:   09/04/22 98.1 °F (36.7 °C) (Oral)     Estimated Creatinine Clearance: 117 mL/min (based on SCr of 0.83 mg/dL). Recent Labs     09/04/22  1831   CREATININE 0.83   WBC 14.4*     Procalcitonin: ---    Pertinent Cultures:  Culture Date Source Results   9/4/22 Blood x2 In process   MRSA Nasal Swab: N/A. Non-respiratory infection.     Plan:  Dosing recommendations based on Bayesian software  Start vancomycin 2000 mg x1 loading dose, followed by 1000 mg every 12 hours  Anticipated AUC of 423 and trough concentration of 12.5 at steady state  Renal labs as indicated   Vancomycin concentration ordered for 9/5/22 @ 1400   Pharmacy will continue to monitor patient and adjust therapy as indicated    Thank you for the consult,  Kavitha Cole, [facility]  9/4/2022 10:44 PM

## 2022-09-05 NOTE — PLAN OF CARE
Problem: Discharge Planning  Goal: Discharge to home or other facility with appropriate resources  9/5/2022 1235 by Abilio Fine RN  Outcome: Progressing     Problem: Pain  Goal: Verbalizes/displays adequate comfort level or baseline comfort level  9/5/2022 1238 by Abilio Fine RN  Outcome: Progressing    Pain level assessment complete. Patient educated on pain scale and control interventions  PRN pain medication given per patient request  Patient instructed to call out with new onset of pain or unrelieved pain       Problem: ABCDS Injury Assessment  Goal: Absence of physical injury  9/5/2022 1235 by Abilio Fine RN  Outcome: Progressing     Problem: Skin/Tissue Integrity  Goal: Absence of new skin breakdown  Description: 1. Monitor for areas of redness and/or skin breakdown  2. Assess vascular access sites hourly  3. Every 4-6 hours minimum:  Change oxygen saturation probe site  4. Every 4-6 hours:  If on nasal continuous positive airway pressure, respiratory therapy assess nares and determine need for appliance change or resting period. 9/5/2022 1238 by Abilio Fine RN  Outcome: Progressing     Problem: Skin/Tissue Integrity  Goal: Absence of new skin breakdown  Description: 1. Monitor for areas of redness and/or skin breakdown  2. Assess vascular access sites hourly  3. Every 4-6 hours minimum:  Change oxygen saturation probe site  4. Every 4-6 hours:  If on nasal continuous positive airway pressure, respiratory therapy assess nares and determine need for appliance change or resting period.   9/5/2022 1235 by Abilio Fine RN  Outcome: Progressing     Problem: Infection - Adult  Goal: Absence of infection at discharge  Outcome: Progressing    Atb as ordered  Afebrile, vss, continue to monitor progress     Problem: Infection - Adult  Goal: Absence of infection during hospitalization  Outcome: Progressing     Problem: Infection - Adult  Goal: Absence of fever/infection during anticipated neutropenic period  Outcome: Progressing

## 2022-09-05 NOTE — PROGRESS NOTES
[address] [facility] Pharmacokinetic Monitoring Service - Vancomycin     Dania Benitez is a 39 y.o. male starting on vancomycin therapy for SSTI. Pharmacy consulted by JANESSA Casillas CNP  for monitoring and adjustment. Target Concentration: Goal trough of 10-15 mg/L and AUC/ABEBE <500 mg*hr/L    Additional Antimicrobials: none    Pertinent Laboratory Values: Wt Readings from Last 1 Encounters:   09/04/22 173 lb 14.4 oz (78.9 kg)     Temp Readings from Last 1 Encounters:   09/05/22 98.1 °F (36.7 °C) (Oral)     Estimated Creatinine Clearance: 116 mL/min (based on SCr of 0.84 mg/dL). Recent Labs     09/04/22  1831 09/05/22  0555   CREATININE 0.83 0.84   WBC 14.4* 11.3     Procalcitonin: ---    Pertinent Cultures:  Culture Date Source Results   9/4/22 Blood x2 In process   MRSA Nasal Swab: N/A. Non-respiratory infection. Plan:  Dosing recommendations based on Bayesian software  AUC subtherapeutic at 374, will increase dose to 1250mg q12h which predicts AUC of 466 and trough 13.9.   Next level 9/7 @ 0600  Pharmacy will continue to monitor patient and adjust therapy as indicated    Thank you for the consult,  Wendy Sanchez, [facility]  9/5/2022 3:06 PM

## 2022-09-05 NOTE — CONSULTS
TELEHEALTH EVALUATION -- Audio/Visual (During NDLN-91 public health emergency)    Dermatology Virtual Inpatient Consult Note  [address] [place] [id number]  Dept: [phone]  Loc: [phone]      CONSULT DATE: 9/4/2022   CONSULTING PHYSICIAN: No ref. provider found      Leroy Laura is a 39 y.o. male  who is admitted to the hospital for:    abscesses    Dermatology is consulted for:  Recurrent abscesses    HISTORY OF PRESENT ILLNESS:  Spike Ayala is a 38 yo M with h/o depression, anxiety, ADHD who is admitted for recurrent abscesses. Today, he has a large painful abscess of buttock with additional smaller pustulopapules and nodules of axilla and scrotum. He had recently completed a course of doxycycline from the urgent care without improvement. He also reports an episode of arm cellulitis in July that was successfully treated with doxycycline. Patient reports multiple episodes of cellulitis and abscesses in various locations over the past 6 months. One episode was severe and led to hospitalization in June. Wound culture grew MRSA (also resistant to tetracyclines), blood culture 1/2 grew coag neg staph. I saw this patient as an outpatient in 2020, initially for suspicion of scabies, then excoriations leading to prurigo nodularis. A biopsy showed chronic spongiotic superficial and deep eosinophilic infiltrate suggestive of arthropod bite reaction, chronic atopic dermatitis or chronic contact dermatitis with superimposed prurigo nodularis. Skin tissue cultures were also performed at that time which grew MSSA and actinetobacter junii. This was treated with ivermectin (for possible scabies), keflex, topical gentamicin, and triamcinolone 0.1 ointment. Patient reports that this rash felt very different than the current abscesses, and resolved completely with treatment.     Patient reports at some point requiring a root canal that had to be revised and is \"still needs fixing,\" and believes he was told by dentist that he would be susceptible to infections until fixed.     CURRENT MEDICATIONS:   Current Facility-Administered Medications   Medication Dose Route Frequency Provider Last Rate Last Admin    amphetamine-dextroamphetamine (ADDERALL XR) extended release capsule 30 mg  30 mg Oral Daily Efren Perez MD        oxyCODONE (OXYCONTIN) extended release tablet 10 mg  10 mg Oral 2 times per day Efren Perez MD        sodium chloride flush 0.9 % injection 10 mL  10 mL IntraVENous PRN Tod Quiroz PA-C   10 mL at 09/04/22 1851    cloNIDine (CATAPRES) tablet 0.2 mg  0.2 mg Oral BID Laquetta Bern, APRN - CNP   0.2 mg at 09/04/22 2253    hydrOXYzine HCl (ATARAX) tablet 25 mg  25 mg Oral Q6H PRN Esequiel Lake, APRN - CNP        0.9 % sodium chloride infusion   IntraVENous Continuous Laquejose j Lake, APRN - CNP 75 mL/hr at 09/05/22 0544 Rate Verify at 09/05/22 0544    sodium chloride flush 0.9 % injection 5-40 mL  5-40 mL IntraVENous 2 times per day Esequiel Lake, APRN - CNP   10 mL at 09/04/22 2238    sodium chloride flush 0.9 % injection 10 mL  10 mL IntraVENous PRN Lacornelio Lake, APRN - CNP        0.9 % sodium chloride infusion   IntraVENous PRN [place] Lake, APRN - CNP        potassium chloride (KLOR-CON M) extended release tablet 40 mEq  40 mEq Oral PRN [place] Lake, APRN - CNP        Or    potassium bicarb-citric acid (EFFER-K) effervescent tablet 40 mEq  40 mEq Oral PRN Laquetta Rincon, APRN - CNP        Or    potassium chloride 10 mEq/100 mL IVPB (Peripheral Line)  10 mEq IntraVENous PRN Thomastta Jaya, APRN - CNP        magnesium sulfate 1000 mg in dextrose 5% 100 mL IVPB  1,000 mg IntraVENous PRN Esequiel Lake, APRN - CNP        enoxaparin (LOVENOX) injection 40 mg  40 mg SubCUTAneous Daily Esequiel Lake, APRN - CNP        ondansetron (ZOFRAN-ODT) disintegrating tablet 4 mg  4 mg Oral Q8H PRN JANESSA Kemp CNP        Or    ondansetron [facility]) injection 4 mg  4 mg IntraVENous Q6H PRN JANESSA Kemp - [place]        polyethylene glycol (GLYCOLAX) packet 17 g  17 g Oral Daily PRN JANESSA Kemp - [place]        acetaminophen (TYLENOL) tablet 650 mg  650 mg Oral Q6H PRN JANESSA Kemp - CNP        Or    acetaminophen (TYLENOL) suppository 650 mg  650 mg Rectal Q6H PRN JANESSA Kemp - [place]        lactobacillus (BACID) tablet 1 tablet  1 tablet Oral TID WC JANESSA Qureshi - NP   1 tablet at 09/05/22 1124    oxyCODONE-acetaminophen (PERCOCET) 5-325 MG per tablet 1 tablet  1 tablet Oral Q4H PRN JANESSA Qureshi NP        Or    oxyCODONE-acetaminophen (PERCOCET) 5-325 MG per tablet 2 tablet  2 tablet Oral Q4H PRN JANESSA Qureshi - NP   2 tablet at 09/05/22 1124    ketorolac (TORADOL) injection 30 mg  30 mg IntraVENous Q6H PRN Ivette Tejada APRN - NP   30 mg at 09/05/22 0555    vancomycin 1000 mg IVPB in 250 mL D5W addavial  1,000 mg IntraVENous Q12H JANESSA Kemp - CNP   Stopped at 09/05/22 0844    vancomycin (VANCOCIN) intermittent dosing (placeholder)   Other RX [facility], APRN - CNP           ALLERGIES:   No Known Allergies    SOCIAL HISTORY:  Social History     Tobacco Use    Smoking status: Never    Smokeless tobacco: Never   Substance Use Topics    Alcohol use: No     Comment: recovering alcoholic       REVIEW OF SYSTEMS:  Review of Systems  Skin:Denies any new changing, growing or bleeding lesions or rashes except as described in the HPI     PHYSICAL EXAM:   Vitals:    09/05/22 1101   BP: (!) 101/54   Pulse: 84   Resp: 16   Temp: 98.1 °F (36.7 °C)   SpO2: 96%       General Exam:  General Appearance: No acute distress, Well nourished     Neuro: Alert and oriented to person, place and time  Psych: Normal affect     Cutaneous Exam: Performed as documented in clinic note below. Focused skin exam of axilla, buttock, face, scrotum was performed. Skin was examined both by still photographs taking by the consulting team, and by live video conference with patient using [employer] cameras. Due to this being a TeleHealth encounter, evaluation of the following organ systems is limited: Vitals/Constitutional/EENT/Resp/CV/GI//MS/Neuro/Skin/Heme-Lymph-Imm. In particular examination of the skin is limited by video quality, available technology, and internet connection. 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 Mupirocin ointment to nares 3x daily x 5 days, repeated monthly. - will arrange outpatient f/u with Fariha Qureshi MD     Pursuant to the emergency declaration under the [facility] waiver authority and the [employer] Act, this Virtual Visit was conducted, with patient's consent, to reduce the patient's risk of exposure to COVID-19. Services were provided through a video synchronous discussion virtually to substitute for in-person consult.     Electronically signed by Brandi Cuello MD on 9/5/22 at 2:02 PM EDT

## 2022-09-05 NOTE — CARE COORDINATION
Case Management Initial Discharge Plan  Ronald Rosales,             Met with:patient to discuss discharge plans. Information verified: address, contacts, phone number, , insurance Yes  PCP: Nic Dowd MD  Date of last visit: 2022    Insurance Provider: [facility]    Discharge Planning    Living Arrangements:  Spouse/Significant Other, Children   Support Systems:  Spouse/Significant Other, Children, Family Members    Home has 2 stories  5 stairs to climb to get into front door, 12 stairs to climb to reach second floor  Location of bedroom/bathroom in home  - stays on main floor    Patient able to perform ADL's:Independent    Current Services (outpatient & in home) none  DME equipment: none  DME provider: N/A    Pharmacy: [facility]    Potential Assistance Needed:  N/A    Patient agreeable to home care: No  Anselmo of choice provided:  n/a    Prior SNF/Rehab Placement and Facility: No  Agreeable to SNF/Rehab: No  Anselmo of choice provided: n/a   Evaluation: n/a    Expected Discharge date:     Patient expects to be discharged to:   home  Follow Up Appointment: Best Day/ Time:      Transportation provider: drove self  Transportation arrangements needed for discharge: No    Readmission Risk              Risk of Unplanned Readmission:  12             Does patient have a readmission risk score greater than 14?: No  If yes, follow-up appointment must be made within 7 days of discharge. Goal of Care:       Discharge Plan: Met with patient at bedside. Lives with girlfriend, independent and works full time. Drove self to ED for skin infection lt buttock, scrotum and lt axilla. Went to urgent care and has been on oral Doxycycline for past 4 days without improvement. Currently on IV Vanco and Zosyn. Blood cultures pending. In contact isolation for history of MRSA. Continue to follow.           Electronically signed by Bartolome Pinon RN on 22 at 9:20 AM EDT

## 2022-09-06 PROBLEM — B95.62 INFECTION OF SKIN DUE TO METHICILLIN RESISTANT STAPHYLOCOCCUS AUREUS (MRSA): Status: ACTIVE | Noted: 2022-09-06

## 2022-09-06 PROBLEM — L02.31 ABSCESS, GLUTEAL, LEFT: Status: ACTIVE | Noted: 2022-09-06

## 2022-09-06 PROBLEM — L08.9 INFECTION OF SKIN DUE TO METHICILLIN RESISTANT STAPHYLOCOCCUS AUREUS (MRSA): Status: ACTIVE | Noted: 2022-09-06

## 2022-09-06 LAB
ESTIMATED AVERAGE GLUCOSE: 105 MG/DL
HBA1C MFR BLD: 5.3 % (ref 4–6)

## 2022-09-06 PROCEDURE — 2580000003 HC RX 258: Performed by: FAMILY MEDICINE

## 2022-09-06 PROCEDURE — 0J990ZZ DRAINAGE OF BUTTOCK SUBCUTANEOUS TISSUE AND FASCIA, OPEN APPROACH: ICD-10-PCS | Performed by: SURGERY

## 2022-09-06 PROCEDURE — 6370000000 HC RX 637 (ALT 250 FOR IP): Performed by: NURSE PRACTITIONER

## 2022-09-06 PROCEDURE — 1200000000 HC SEMI PRIVATE

## 2022-09-06 PROCEDURE — 87186 SC STD MICRODIL/AGAR DIL: CPT

## 2022-09-06 PROCEDURE — 99222 1ST HOSP IP/OBS MODERATE 55: CPT | Performed by: INTERNAL MEDICINE

## 2022-09-06 PROCEDURE — 86403 PARTICLE AGGLUT ANTBDY SCRN: CPT

## 2022-09-06 PROCEDURE — 2580000003 HC RX 258: Performed by: NURSE PRACTITIONER

## 2022-09-06 PROCEDURE — 87205 SMEAR GRAM STAIN: CPT

## 2022-09-06 PROCEDURE — 6370000000 HC RX 637 (ALT 250 FOR IP): Performed by: FAMILY MEDICINE

## 2022-09-06 PROCEDURE — 99232 SBSQ HOSP IP/OBS MODERATE 35: CPT | Performed by: STUDENT IN AN ORGANIZED HEALTH CARE EDUCATION/TRAINING PROGRAM

## 2022-09-06 PROCEDURE — 2500000003 HC RX 250 WO HCPCS: Performed by: SURGERY

## 2022-09-06 PROCEDURE — 87070 CULTURE OTHR SPECIMN AEROBIC: CPT

## 2022-09-06 PROCEDURE — 6360000002 HC RX W HCPCS: Performed by: NURSE PRACTITIONER

## 2022-09-06 PROCEDURE — 6360000002 HC RX W HCPCS: Performed by: FAMILY MEDICINE

## 2022-09-06 RX ORDER — LIDOCAINE HYDROCHLORIDE AND EPINEPHRINE 10; 10 MG/ML; UG/ML
20 INJECTION, SOLUTION INFILTRATION; PERINEURAL ONCE
Status: COMPLETED | OUTPATIENT
Start: 2022-09-06 | End: 2022-09-06

## 2022-09-06 RX ORDER — FENTANYL CITRATE 0.05 MG/ML
50 INJECTION, SOLUTION INTRAMUSCULAR; INTRAVENOUS ONCE
Status: DISCONTINUED | OUTPATIENT
Start: 2022-09-06 | End: 2022-09-07 | Stop reason: HOSPADM

## 2022-09-06 RX ADMIN — SODIUM CHLORIDE: 9 INJECTION, SOLUTION INTRAVENOUS at 06:34

## 2022-09-06 RX ADMIN — PROBIOTIC PRODUCT - TAB 1 TABLET: TAB at 14:31

## 2022-09-06 RX ADMIN — PROBIOTIC PRODUCT - TAB 1 TABLET: TAB at 09:22

## 2022-09-06 RX ADMIN — TRAMADOL HYDROCHLORIDE 50 MG: 50 TABLET, COATED ORAL at 06:01

## 2022-09-06 RX ADMIN — TRAMADOL HYDROCHLORIDE 50 MG: 50 TABLET, COATED ORAL at 01:56

## 2022-09-06 RX ADMIN — KETOROLAC TROMETHAMINE 30 MG: 30 INJECTION, SOLUTION INTRAMUSCULAR; INTRAVENOUS at 01:57

## 2022-09-06 RX ADMIN — OXYCODONE HYDROCHLORIDE 10 MG: 10 TABLET, FILM COATED, EXTENDED RELEASE ORAL at 09:22

## 2022-09-06 RX ADMIN — TRAMADOL HYDROCHLORIDE 50 MG: 50 TABLET, COATED ORAL at 19:30

## 2022-09-06 RX ADMIN — PROBIOTIC PRODUCT - TAB 1 TABLET: TAB at 19:31

## 2022-09-06 RX ADMIN — VANCOMYCIN HYDROCHLORIDE 1250 MG: 5 INJECTION, POWDER, LYOPHILIZED, FOR SOLUTION INTRAVENOUS at 20:25

## 2022-09-06 RX ADMIN — TRAMADOL HYDROCHLORIDE 50 MG: 50 TABLET, COATED ORAL at 23:32

## 2022-09-06 RX ADMIN — TRAMADOL HYDROCHLORIDE 50 MG: 50 TABLET, COATED ORAL at 14:56

## 2022-09-06 RX ADMIN — TRAMADOL HYDROCHLORIDE 50 MG: 50 TABLET, COATED ORAL at 10:58

## 2022-09-06 RX ADMIN — VANCOMYCIN HYDROCHLORIDE 1250 MG: 5 INJECTION, POWDER, LYOPHILIZED, FOR SOLUTION INTRAVENOUS at 09:22

## 2022-09-06 RX ADMIN — OXYCODONE HYDROCHLORIDE 10 MG: 10 TABLET, FILM COATED, EXTENDED RELEASE ORAL at 20:23

## 2022-09-06 RX ADMIN — LIDOCAINE HYDROCHLORIDE AND EPINEPHRINE 20 ML: 10; 10 INJECTION, SOLUTION INFILTRATION; PERINEURAL at 15:41

## 2022-09-06 RX ADMIN — POLYETHYLENE GLYCOL 3350 17 G: 17 POWDER, FOR SOLUTION ORAL at 23:01

## 2022-09-06 ASSESSMENT — PAIN DESCRIPTION - DESCRIPTORS
DESCRIPTORS: THROBBING
DESCRIPTORS: THROBBING
DESCRIPTORS: ACHING;THROBBING
DESCRIPTORS: THROBBING
DESCRIPTORS: THROBBING
DESCRIPTORS: STABBING
DESCRIPTORS: ACHING;DISCOMFORT;TENDER

## 2022-09-06 ASSESSMENT — PAIN DESCRIPTION - FREQUENCY
FREQUENCY: INTERMITTENT
FREQUENCY: CONTINUOUS
FREQUENCY: INTERMITTENT

## 2022-09-06 ASSESSMENT — PAIN DESCRIPTION - ONSET
ONSET: ON-GOING

## 2022-09-06 ASSESSMENT — PAIN DESCRIPTION - ORIENTATION
ORIENTATION: LEFT;INNER;UPPER

## 2022-09-06 ASSESSMENT — ENCOUNTER SYMPTOMS
BLOOD IN STOOL: 0
WHEEZING: 0
ABDOMINAL PAIN: 0
VOMITING: 0
CHEST TIGHTNESS: 0
NAUSEA: 0
CONSTIPATION: 0
SHORTNESS OF BREATH: 0
COUGH: 0
DIARRHEA: 0

## 2022-09-06 ASSESSMENT — PAIN DESCRIPTION - PAIN TYPE
TYPE: ACUTE PAIN

## 2022-09-06 ASSESSMENT — PAIN DESCRIPTION - LOCATION
LOCATION: BUTTOCKS
LOCATION: GROIN;BUTTOCKS
LOCATION: BUTTOCKS
LOCATION: BUTTOCKS

## 2022-09-06 ASSESSMENT — PAIN SCALES - GENERAL
PAINLEVEL_OUTOF10: 8
PAINLEVEL_OUTOF10: 9
PAINLEVEL_OUTOF10: 6
PAINLEVEL_OUTOF10: 9
PAINLEVEL_OUTOF10: 6
PAINLEVEL_OUTOF10: 7
PAINLEVEL_OUTOF10: 8

## 2022-09-06 ASSESSMENT — PAIN - FUNCTIONAL ASSESSMENT
PAIN_FUNCTIONAL_ASSESSMENT: ACTIVITIES ARE NOT PREVENTED
PAIN_FUNCTIONAL_ASSESSMENT: PREVENTS OR INTERFERES SOME ACTIVE ACTIVITIES AND ADLS
PAIN_FUNCTIONAL_ASSESSMENT: PREVENTS OR INTERFERES WITH MANY ACTIVE NOT PASSIVE ACTIVITIES

## 2022-09-06 NOTE — OP NOTE
Operative Note      Patient: Hermann Back  YOB: 1980  MRN: [id number]    Date of Procedure:   9/6/2022    Pre-Op Diagnosis: Complex left gluteal abscess    Post-Op Diagnosis: Same       Procedure: Sharp incision and drainage of complex left gluteal abscess    Surgeon:  Mylene Rosales M.D. Assistant:   * Surgery not found *    Anesthesia: 20 ml 1% lidocaine with epinephrine    Estimated Blood Loss (mL): Minimal    Complications: None    Specimens:   Deep wound cultures    Implants:  None      Drains: None    Findings: Complex left gluteal abscess    Indications for procedure: This is a very pleasant 26-year-old gentleman with a history of multiple recurrent soft tissue infections. Risks and benefits of sharp incision and drainage of a complex left gluteal abscess were discussed with the patient and verbal and written consent was obtained. Detailed Description of Procedure:   After verbal and written consent, the patient was positioned in the left lateral cubitus position. A timeout was performed with the staff in the room confirming both the patient and the procedure to be performed. The procedure was begun with a sterile prep and drape. Local anesthesia was infiltrated into the skin and subcutaneous tissue as a field block. A radial incision was made with a scalpel. A large complex fluctuant cavity was encountered. The wound was opened widely with a scalpel. All loculations were freed. The wound was irrigated. Deep wound cultures were obtained. The wound was then packed with 1 inch iodoform gauze. A dry dressing was applied. The patient tolerated well and there were no immediate complications.     Electronically signed by Mylene Rosales MD on 9/6/2022 at 3:51 PM

## 2022-09-06 NOTE — CONSULTS
daily for 30 days. (Patient not taking: Reported on 9/4/2022)  diphenhydrAMINE (BENADRYL) 25 MG capsule, Take 1 capsule by mouth every 6 hours as needed for Itching (Patient not taking: Reported on 6/8/2022)    Allergies:  Patient has no known allergies. Social History:   Social History     Socioeconomic History    Marital status: Single     Spouse name: Not on file    Number of children: Not on file    Years of education: Not on file    Highest education level: Not on file   Occupational History    Not on file   Tobacco Use    Smoking status: Never    Smokeless tobacco: Never   Vaping Use    Vaping Use: Never used   Substance and Sexual Activity    Alcohol use: No     Comment: recovering alcoholic    Drug use: No    Sexual activity: Not on file   Other Topics Concern    Not on file   Social History Narrative    Not on file     Social Determinants of Health     Financial Resource Strain: Not on file   Food Insecurity: Not on file   Transportation Needs: Not on file   Physical Activity: Not on file   Stress: Not on file   Social Connections: Not on file   Intimate Partner Violence: Not on file   Housing Stability: Not on file       Family History:       Problem Relation Age of Onset    Rheum Arthritis Mother     Depression Mother     No Known Problems Father     No Known Problems Sister     No Known Problems Brother        REVIEW OF SYSTEMS:    CONSTITUTIONAL: Denies recent weight loss, fatigue, fevers, chills. HEENT: Denies rhinorrhea, dysphagia, odynphagia. CARDIOVASCULAR: Denies history of MI, recent chest pain. RESPIRATORY: Denies recent history of shortness of breath or history of PE. GASTROINTESTINAL: denies n/v/d  GENITOURINARY: Denies increased frequency or dysuria. HEMATOLOGIC/LYMPHATIC: Denies history of anemia or DVTs. ENDOCRINE: Denies history of thyroid problems or diabetes. NEURO: Denies history of CVA, TIA. Review of systems negative unless listed above.     PHYSICAL EXAM:    VITALS:  BP 117/67   Pulse 95   Temp 98.5 °F (36.9 °C) (Oral)   Resp 15   Ht 5' 9\" (1.753 m)   Wt 172 lb 9.6 oz (78.3 kg)   SpO2 97%   BMI 25.49 kg/m²   INTAKE/OUTPUT:     Intake/Output Summary (Last 24 hours) at 9/6/2022 1317  Last data filed at 9/6/2022 1100  Gross per 24 hour   Intake 2063.35 ml   Output 700 ml   Net 1363.35 ml       CONSTITUTIONAL:  awake, alert, no apparent distress, not distressed and normal weight  HEENT: Normocephalic/atraumatic, without obvious abnormality. NECK:  Supple, symmetrical, trachea midline   LUNGS: equal chest rise and fall, no increased WOB, no audible stridor or wheeze  ABDOMEN: nontender, nondistended   MUSCULOSKELETAL: Muscle strength intact in all extremities bilaterally. NEUROLOGIC: Gross motor intact without focal weakness. SKIN: firm erythema in L gluteal cleft with slight purulent drainage, estimate 6cm x 8cm; tender, no increased warmth. Tender, pustular lesions in scrotum, perineal area and inguinal folds. Tender pustular lesions in L axilla.    Orientation:   oriented to person, place, and time        CBC:   Lab Results   Component Value Date/Time    WBC 11.3 09/05/2022 05:55 AM    RBC 4.07 09/05/2022 05:55 AM    HGB 12.0 09/05/2022 05:55 AM    HCT 37.7 09/05/2022 05:55 AM    MCV 92.6 09/05/2022 05:55 AM    MCH 29.5 09/05/2022 05:55 AM    MCHC 31.8 09/05/2022 05:55 AM    RDW 13.1 09/05/2022 05:55 AM     09/05/2022 05:55 AM    MPV 9.8 09/05/2022 05:55 AM     BMP:    Lab Results   Component Value Date/Time     09/05/2022 05:55 AM    K 3.9 09/05/2022 05:55 AM     09/05/2022 05:55 AM    CO2 25 09/05/2022 05:55 AM    BUN 12 09/05/2022 05:55 AM    CREATININE 0.84 09/05/2022 05:55 AM    CALCIUM 8.6 09/05/2022 05:55 AM    GFRAA >60 09/05/2022 05:55 AM    LABGLOM >60 09/05/2022 05:55 AM    GLUCOSE 107 09/05/2022 05:55 AM       Pertinent Radiology:   CT PELVIS W CONTRAST Additional Contrast? None    Result Date: 9/4/2022  EXAMINATION: CT OF THE PELVIS WITH CONTRAST 9/4/2022 5:51 pm TECHNIQUE: CT of the pelvis was performed with the administration of intravenous contrast. Multiplanar reformatted images are provided for review. Automated exposure control, iterative reconstruction, and/or weight based adjustment of the mA/kV was utilized to reduce the radiation dose to as low as reasonably achievable. COMPARISON: None. HISTORY: ORDERING SYSTEM PROVIDED HISTORY: attn left buttock area need rectum. abscess? area is red tender and firm TECHNOLOGIST PROVIDED HISTORY: attn left buttock area need rectum. abscess? area is red tender and firm Decision Support Exception - unselect if not a suspected or confirmed emergency medical condition->Emergency Medical Condition (MA) Reason for Exam: attn left buttock area need rectum. abscess? area is red tender and firm FINDINGS: No pelvic free fluid or enlarged or suspicious pelvic or inguinal lymphadenopathy. The prostate gland is not enlarged. The urinary bladder and the pelvic bowel loops and osseous structures are unremarkable. No rectal wall thickening. No perirectal or perianal abnormality. Medial left gluteal region skin thickening and subcutaneous edema compatible cellulitis. No soft tissue fluid collection or soft tissue gas to suggest abscess.          ASSESSMENT:  Active Hospital Problems    Diagnosis Date Noted    Boil, scrotum [N49.2] 09/05/2022     Priority: Medium    Recurrent infection of skin [L08.9] 09/05/2022     Priority: Medium    Cellulitis [L03.90] 09/04/2022     Priority: Medium    Attention deficit hyperactivity disorder (ADHD) [F90.9] 11/11/2021    Generalized anxiety disorder [F41.1] 09/03/2021    Moderate episode of recurrent major depressive disorder ([facility] [address] [F33.1] 09/03/2021       Cellulitis  History of MRSA cellulitis    Plan:  Continue medical mgmt and supportive care per primary  I&D and culture L buttock      Electronically signed by Kari Sawyer DO  on 9/6/2022 at 1:17 PM      Attending Physician Statement  I have discussed the case, including pertinent history and exam findings with the resident. I agree with the assessment, plan and orders as documented by the resident. Patient seen and examined. Agree with above. Fluctuant area on left gluteal region warrants I&D. This was performed at the bedside without complication. Deep wound cultures obtained.

## 2022-09-06 NOTE — PROGRESS NOTES
[address] [facility] Pharmacokinetic Monitoring Service - Vancomycin     Rashad Ayala is a 39 y.o. male starting on vancomycin therapy for SSTI. Pharmacy consulted by JANESSA Kelin CNP  for monitoring and adjustment. DAY OF THERAPY: 3    Target Concentration: Goal trough of 10-15 mg/L and AUC/ABEBE <500 mg*hr/L    Additional Antimicrobials: none    Pertinent Laboratory Values: Wt Readings from Last 1 Encounters:   09/04/22 173 lb 14.4 oz (78.9 kg)     Temp Readings from Last 1 Encounters:   09/05/22 98.1 °F (36.7 °C) (Oral)     Estimated Creatinine Clearance: 116 mL/min (based on SCr of 0.84 mg/dL). Recent Labs     09/04/22  1831 09/05/22  0555   CREATININE 0.83 0.84   WBC 14.4* 11.3     Procalcitonin: ---    Pertinent Cultures:  Culture Date Source Results   9/4/22 Blood x2 In process   MRSA Nasal Swab: N/A. Non-respiratory infection.     Plan:  Dosing recommendations based on Bayesian software  AUC subtherapeutic at 374, will increase dose to 1250mg q12h which predicts AUC of 534 and trough 16.0   Next level 9/7 @ 0600  Pharmacy will continue to monitor patient and adjust therapy as indicated    Thank you for the consult,  POLO [facility]  9/6/2022 10:58 AM

## 2022-09-06 NOTE — PLAN OF CARE
Problem: Discharge Planning  Goal: Discharge to home or other facility with appropriate resources  9/6/2022 1913 by Luisito Garcia RN  Outcome: Not Progressing  Flowsheets (Taken 9/6/2022 0749)  Discharge to home or other facility with appropriate resources:   Identify barriers to discharge with patient and caregiver   Arrange for needed discharge resources and transportation as appropriate   Identify discharge learning needs (meds, wound care, etc)   Refer to discharge planning if patient needs post-hospital services based on physician order or complex needs related to functional status, cognitive ability or social support system  9/6/2022 0613 by Alber Strong RN  Outcome: Progressing     Problem: Pain  Goal: Verbalizes/displays adequate comfort level or baseline comfort level  9/6/2022 1913 by Luisito Garcia RN  Outcome: Not Progressing  9/6/2022 0613 by Alber Strong RN  Outcome: Progressing     Problem: ABCDS Injury Assessment  Goal: Absence of physical injury  9/6/2022 1913 by Luisito Garcia RN  Outcome: Not Progressing  Flowsheets (Taken 9/6/2022 0614 by Alber Strong RN)  Absence of Physical Injury: Implement safety measures based on patient assessment  9/6/2022 0613 by Alber Strong RN  Outcome: Progressing     Problem: Skin/Tissue Integrity  Goal: Absence of new skin breakdown  Description: 1. Monitor for areas of redness and/or skin breakdown  2. Assess vascular access sites hourly  3. Every 4-6 hours minimum:  Change oxygen saturation probe site  4. Every 4-6 hours:  If on nasal continuous positive airway pressure, respiratory therapy assess nares and determine need for appliance change or resting period.   Outcome: Not Progressing     Problem: Infection - Adult  Goal: Absence of infection at discharge  Outcome: Not Progressing  Goal: Absence of infection during hospitalization  Outcome: Not Progressing  Goal: Absence of fever/infection during anticipated neutropenic period  Outcome: Not Progressing     Problem: Discharge Planning  Goal: Discharge to home or other facility with appropriate resources  9/6/2022 1913 by Dorrie Schlatter, RN  Outcome: Not Progressing  Flowsheets (Taken 9/6/2022 0749)  Discharge to home or other facility with appropriate resources:   Identify barriers to discharge with patient and caregiver   Arrange for needed discharge resources and transportation as appropriate   Identify discharge learning needs (meds, wound care, etc)   Refer to discharge planning if patient needs post-hospital services based on physician order or complex needs related to functional status, cognitive ability or social support system  9/6/2022 0613 by Pantera Rivera RN  Outcome: Progressing     Problem: Pain  Goal: Verbalizes/displays adequate comfort level or baseline comfort level  9/6/2022 1913 by Dorrie Schlatter, RN  Outcome: Not Progressing  9/6/2022 0613 by Pantera Rivera RN  Outcome: Progressing     Problem: ABCDS Injury Assessment  Goal: Absence of physical injury  9/6/2022 1913 by Dorrie Schlatter, RN  Outcome: Not Progressing  Flowsheets (Taken 9/6/2022 0614 by Pantera Rivera RN)  Absence of Physical Injury: Implement safety measures based on patient assessment  9/6/2022 0613 by Pantera Rivera RN  Outcome: Progressing     Problem: Skin/Tissue Integrity  Goal: Absence of new skin breakdown  Description: 1. Monitor for areas of redness and/or skin breakdown  2. Assess vascular access sites hourly  3. Every 4-6 hours minimum:  Change oxygen saturation probe site  4. Every 4-6 hours:  If on nasal continuous positive airway pressure, respiratory therapy assess nares and determine need for appliance change or resting period.   Outcome: Not Progressing     Problem: Infection - Adult  Goal: Absence of infection at discharge  Outcome: Not Progressing  Goal: Absence of infection during hospitalization  Outcome: Not Progressing  Goal: Absence of fever/infection during anticipated neutropenic period  Outcome: Not Progressing

## 2022-09-06 NOTE — CARE COORDINATION
KEN Planning    Spoke with pt at bedside. 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 Discussed possibility of IV ABX-he is teachable, he relays his girlfriend can help too, faxed referral to [facility] [address]. LM with Latasha. Surgical consult will need I+D left buttock. Dermatology did have a televisit yesterday.

## 2022-09-06 NOTE — CONSULTS
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 Cultures have been sent but again clinical picture is consistent with MRSA abscess    Chief complaint/reason for consultation:   Recurrent skin infections, abscess    History of Present Illness:   Andrés Klein is a 39y.o.-year-old male who was initially admitted on 9/4/2022. Loly Dawkins has a history of depression/ADHD and was seen in June 2022 with a right upper extremity/forearm pustular cellulitis as well as a right knee soft tissue abscess secondary to MRSA. The patient has had recurrent abscesses and has a large painful abscess to the gluteal area as well as small pustular papules and nodules in the axilla and scrotum. He had recently been seen at an urgent care center and started on doxycycline and has not had any improvement. The patient presented to the emergency department and has since been admitted. There has been some discussion for working him up for an immunodeficiency and was seen by Dr. Shanice Briones from dermatology. He was started on IV antimicrobial therapy with vancomycin I was asked to evaluate and help with antibiotic choice. I have personally reviewed the past medical history, past surgical history, medications, social history, and family history, and I have updated the database accordingly.   Past Medical History:     Past Medical History:   Diagnosis Date    ADHD (attention deficit hyperactivity disorder)     Depression      Past Surgical  History:     Past Surgical History:   Procedure Laterality Date    ADENOIDECTOMY AND TYMPANOSTOMY TUBE PLACEMENT      FACIAL COSMETIC SURGERY       Medications:      amphetamine-dextroamphetamine  30 mg Oral Daily    oxyCODONE  10 mg Oral 2 times per day    vancomycin  1,250 mg IntraVENous Q12H    cloNIDine  0.2 mg Oral BID    sodium chloride flush  5-40 mL IntraVENous 2 times per day    enoxaparin  40 mg SubCUTAneous Daily    lactobacillus  1 tablet Oral TID WC    vancomycin (VANCOCIN) intermittent dosing (placeholder)   Other RX Placeholder     Social History:     Social History     Socioeconomic History    Marital status: Single     Spouse name: Not on file    Number of children: Not on file    Years of education: Not on file    Highest education level: Not on file   Occupational History    Not on file   Tobacco Use    Smoking status: Never    Smokeless tobacco: Never   Vaping Use    Vaping Use: Never used   Substance and Sexual Activity    Alcohol use: No     Comment: recovering alcoholic    Drug use: No    Sexual activity: Not on file   Other Topics Concern    Not on file   Social History Narrative    Not on file     Social Determinants of Health     Financial Resource Strain: Not on file   Food Insecurity: Not on file   Transportation Needs: Not on file   Physical Activity: Not on file   Stress: Not on file   Social Connections: Not on file   Intimate Partner Violence: Not on file   Housing Stability: Not on file     Family History:     Family History   Problem Relation Age of Onset    Rheum Arthritis Mother     Depression Mother     No Known Problems Father     No Known Problems Sister     No Known Problems Brother       Allergies:   Patient has no known allergies. Review of Systems:   General: No fevers or chills. Eyes: No double vision or blurry vision.   ENT: No sore throat or runny nose.  Cardiovascular: No chest pain or palpitations. Lung: No shortness of breath or cough. Abdomen: No nausea, vomiting, diarrhea, or abdominal pain. Genitourinary: No increased urinary frequency, or dysuria. Musculoskeletal: No muscle aches or pains. Hematologic: No bleeding or bruising. Neurologic: No headache, weakness, numbness, or tingling. Physical Examination :   /67   Pulse 95   Temp 98.5 °F (36.9 °C) (Oral)   Resp 15   Ht 5' 9\" (1.753 m)   Wt 172 lb 9.6 oz (78.3 kg)   SpO2 97%   BMI 25.49 kg/m²     Temperature Range: Temp: 98.5 °F (36.9 °C) Temp  Av.4 °F (36.9 °C)  Min: 97.9 °F (36.6 °C)  Max: 98.8 °F (37.1 °C)  General Appearance: Awake, alert, and in no apparent distress  Head: Normocephalic, without obvious abnormality, atraumatic  Eyes: Pupils equal, round, reactive, to light and accommodation; extraocular movements intact; sclera anicteric; conjunctivae pink  ENT: Oropharynx clear, without erythema, exudate, or thrush. Neck: Supple, without lymphadenopathy. Pulmonary/Chest: Clear to auscultation, without wheezes, rales, or rhonchi  Cardiovascular: Regular rate and rhythm without murmurs, rubs, or gallops. Abdomen: Soft, nontender, nondistended. Extremities: No cyanosis, clubbing, edema, or effusions. Neurologic: No gross sensory or motor deficits.     Skin: The patient has a pustular lesion in the left axilla, there is a left-sided gluteal/perianal soft tissue abscess with purulence expressed    Medical Decision Making:   I have independently reviewed/ordered the following labs:  CBC with Differential:   Recent Labs     22  0555   WBC 14.4* 11.3   HGB 13.0 12.0*   HCT 39.7* 37.7*    156   LYMPHOPCT 10*  --    MONOPCT 11  --      BMP:   Recent Labs     22  0555   * 137   K 4.0 3.9   CL 99 105   CO2 25 25   BUN 14 12   CREATININE 0.83 0.84     Hepatic Function Panel: No results for input(s): PROT, LABALBU, BILIDIR, IBILI, BILITOT, ALKPHOS, ALT, AST in the last 72 hours. Lab Results   Component Value Date/Time    PROCAL <0.02 09/04/2022 06:31 PM       Lab Results   Component Value Date/Time    CRP 6.1 06/01/2022 05:37 PM     No results found for: FERRITIN  No results found for: FIBRINOGEN  No results found for: DDIMER  No results found for: LDH    Lab Results   Component Value Date    SEDRATE 22 (H) 06/01/2022       No results found for: COVID19  No results found for requested labs within last 30 days. Imaging Studies:   CT OF THE PELVIS WITH CONTRAST 9/4/2022 5:51 pm T  FINDINGS:   Medial left gluteal region skin thickening and subcutaneous edema compatible cellulitis. No soft tissue fluid collection or soft tissue gas to suggest abscess. Cultures:     Culture, Blood 2 [[id number]] Collected: 09/04/22 1843   Order Status: Completed Specimen: Blood Updated: 09/05/22 2328    Specimen Description . BLOOD    Special Requests 1ML R HAND    Culture NO GROWTH 1 DAY   Culture, Blood 1 [[id number]] Collected: 09/04/22 1831   Order Status: Completed Specimen: Blood Updated: 09/05/22 2325    Specimen Description . BLOOD    Special Requests RAC 10ML    Culture NO GROWTH 1 DAY   Wound Gram stain [[id number]]    Order Status: No result Specimen: No Site Given from Wound    Culture, Wound Aerobic Only [[id number]]    Order Status: No result Specimen: No Site Given from Skin          Thank you for allowing us to participate in the care of this patient. Please call with questions.     Electronically signed by Theresa Mac MD on 9/6/2022 at 12:03 PM      [facility]  Theresa Mac MD  [employer] messaging  OFFICE: [phone]      This note is created with the assistance of a speech recognition program.  While intending to generate a document that actually reflects the content of the visit, the document can still have some errors including those of syntax and sound a like substitutions which may escape proof reading. In such instances, actual meaning can be extrapolated by contextual diversion.

## 2022-09-06 NOTE — PROGRESS NOTES
infections without pause since April and has been evaluated by Dr. George Ramey since 2020    Previous biopsy in derm office - A biopsy showed chronic spongiotic superficial and deep eosinophilic infiltrate suggestive of arthropod bite reaction, chronic atopic dermatitis or chronic contact dermatitis with superimposed prurigo nodularis. MSSA and actinetobacter from skin cultures - ivermectin treatment keflex topical gent and triamcinolone were previous derm therapies      Review of Systems:     Review of Systems   Constitutional:  Positive for activity change. Negative for chills, diaphoresis and fever. HENT:  Negative for congestion. Eyes:  Negative for visual disturbance. Respiratory:  Negative for cough, chest tightness, shortness of breath and wheezing. Cardiovascular:  Negative for chest pain, palpitations and leg swelling. Gastrointestinal:  Negative for abdominal pain, blood in stool, constipation, diarrhea, nausea and vomiting. Genitourinary:  Negative for difficulty urinating. Skin:  Positive for wound. Neurological:  Negative for dizziness, weakness, light-headedness, numbness and headaches. Psychiatric/Behavioral:  Positive for dysphoric mood (Patient starting frustrated from the recurrent skin infections). All other systems reviewed and are negative. Medications:      Allergies:  No Known Allergies    Current Meds:   Scheduled Meds:    lidocaine-EPINEPHrine  20 mL IntraDERmal Once    amphetamine-dextroamphetamine  30 mg Oral Daily    oxyCODONE  10 mg Oral 2 times per day    vancomycin  1,250 mg IntraVENous Q12H    cloNIDine  0.2 mg Oral BID    sodium chloride flush  5-40 mL IntraVENous 2 times per day    enoxaparin  40 mg SubCUTAneous Daily    lactobacillus  1 tablet Oral TID WC    vancomycin (VANCOCIN) intermittent dosing (placeholder)   Other RX Placeholder     Continuous Infusions:    sodium chloride 75 mL/hr at 09/06/22 0634    sodium chloride       PRN Meds: traMADol, sodium chloride flush, hydrOXYzine HCl, sodium chloride flush, sodium chloride, potassium chloride **OR** potassium alternative oral replacement **OR** potassium chloride, magnesium sulfate, ondansetron **OR** ondansetron, polyethylene glycol, acetaminophen **OR** acetaminophen, ketorolac    Data:     Past Medical History:   has a past medical history of ADHD (attention deficit hyperactivity disorder) and Depression. Social History:   reports that he has never smoked. He has never used smokeless tobacco. He reports that he does not drink alcohol and does not use drugs. Family History:   Family History   Problem Relation Age of Onset    Rheum Arthritis Mother     Depression Mother     No Known Problems Father     No Known Problems Sister     No Known Problems Brother        Vitals:  /62   Pulse 97   Temp 98.1 °F (36.7 °C) (Oral)   Resp 16   Ht 5' 9\" (1.753 m)   Wt 172 lb 9.6 oz (78.3 kg)   SpO2 98%   BMI 25.49 kg/m²   Temp (24hrs), Av.3 °F (36.8 °C), Min:97.9 °F (36.6 °C), Max:98.8 °F (37.1 °C)    No results for input(s): POCGLU in the last 72 hours. I/O (24Hr):     Intake/Output Summary (Last 24 hours) at 2022 1424  Last data filed at 2022 1324  Gross per 24 hour   Intake 2063.35 ml   Output 900 ml   Net 1163.35 ml         Labs:  Hematology:  Recent Labs     22  0555   WBC 14.4* 11.3   RBC 4.38 4.07*   HGB 13.0 12.0*   HCT 39.7* 37.7*   MCV 90.6 92.6   MCH 29.7 29.5   MCHC 32.7 31.8   RDW 13.1 13.1    156   MPV 10.1 9.8       Chemistry:  Recent Labs     22  0555   * 137   K 4.0 3.9   CL 99 105   CO2 25 25   GLUCOSE 98 107*   BUN 14 12   CREATININE 0.83 0.84   ANIONGAP 10 7*   LABGLOM >60 >60   GFRAA >60 >60   CALCIUM 8.9 8.6     Recent Labs     22  1831   LABA1C 5.3     ABG:No results found for: POCPH, PHART, PH, POCPCO2, ZWD4ICI, PCO2, POCPO2, PO2ART, PO2, POCHCO3, XUX6ULB, HCO3, NBEA, PBEA, BEART, BE, THGBART, THB, XCY2THJ, INIB4HWC, H0KIBBLQ, O2SAT, FIO2  Lab Results   Component Value Date/Time    SPECIAL 1ML R HAND 09/04/2022 06:43 PM     Lab Results   Component Value Date/Time    CULTURE NO GROWTH 1 DAY 09/04/2022 06:43 PM       Radiology:  CT PELVIS W CONTRAST Additional Contrast? None    Result Date: 9/4/2022  Medial left gluteal region skin thickening and subcutaneous edema compatible cellulitis. No soft tissue fluid collection or soft tissue gas to suggest abscess. Physical Examination:        Physical Exam  Vitals and nursing note reviewed. Constitutional:       General: He is not in acute distress. HENT:      Head: Normocephalic and atraumatic. Eyes:      Conjunctiva/sclera: Conjunctivae normal.      Pupils: Pupils are equal, round, and reactive to light. Cardiovascular:      Rate and Rhythm: Normal rate and regular rhythm. Heart sounds: No murmur heard. Pulmonary:      Effort: Pulmonary effort is normal. No accessory muscle usage or respiratory distress. Breath sounds: No stridor. No decreased breath sounds, wheezing, rhonchi or rales. Abdominal:      General: Bowel sounds are normal. There is no distension. Palpations: Abdomen is soft. Abdomen is not rigid. Tenderness: There is no abdominal tenderness. There is no guarding. Musculoskeletal:         General: No tenderness. Skin:     General: Skin is warm and dry. Findings: Erythema and lesion present. No rash. Comments: Please see pics attached here from media     Neurological:      Mental Status: He is alert and oriented to person, place, and time. Cranial Nerves: No cranial nerve deficit. Motor: No seizure activity. Psychiatric:         Speech: Speech normal.         Behavior: Behavior normal. Behavior is cooperative.      Perianal abscess looks worse today              Assessment:        Hospital Problems             Last Modified POA    * (Principal) Cellulitis 9/4/2022 Yes    Boil, scrotum 9/5/2022 Yes Recurrent infection of skin 9/5/2022 Yes    Generalized anxiety disorder 9/5/2022 Yes    Moderate episode of recurrent major depressive disorder ([address] 9/5/2022 Yes    Attention deficit hyperactivity disorder (ADHD) 9/5/2022 Yes     Plan:        Gluteal cellulitis, scrotal skin boils and left axillary abscess: -Patient with known recurrent skin infections, Dr. Taniya Carrion in 2020   -Patient did have right hand infection that grew MRSA in June 2022.  -Patient does not have predisposing skin disorder.  -At this point possibilities of recurrent infections growing staph might be bacteremia, IV drug use versus immunodeficiency  Abx appreciate ID input  Pain control scheduled and PRN narcotics  Maintain SSRI  Vanocmycin currently  I&D this afternoon bedside    Depression: discontinue Lexapro, patient reports not taking          Yvan De Souza MD  9/6/2022  2:24 PM

## 2022-09-07 VITALS
HEIGHT: 69 IN | BODY MASS INDEX: 25.53 KG/M2 | OXYGEN SATURATION: 96 % | SYSTOLIC BLOOD PRESSURE: 101 MMHG | RESPIRATION RATE: 16 BRPM | WEIGHT: 172.38 LBS | DIASTOLIC BLOOD PRESSURE: 61 MMHG | HEART RATE: 68 BPM | TEMPERATURE: 97.4 F

## 2022-09-07 PROBLEM — K61.0 PERIANAL ABSCESS: Status: ACTIVE | Noted: 2022-09-07

## 2022-09-07 PROBLEM — L02.92 FURUNCULOSIS: Status: ACTIVE | Noted: 2022-09-07

## 2022-09-07 PROBLEM — L03.317 CELLULITIS AND ABSCESS OF BUTTOCK: Status: ACTIVE | Noted: 2022-09-06

## 2022-09-07 LAB
BUN BLDV-MCNC: 7 MG/DL (ref 6–20)
CREAT SERPL-MCNC: 0.72 MG/DL (ref 0.7–1.2)
GFR AFRICAN AMERICAN: >60 ML/MIN
GFR NON-AFRICAN AMERICAN: >60 ML/MIN
GFR SERPL CREATININE-BSD FRML MDRD: NORMAL ML/MIN/{1.73_M2}
VANCOMYCIN RANDOM: 11.2 UG/ML

## 2022-09-07 PROCEDURE — 80202 ASSAY OF VANCOMYCIN: CPT

## 2022-09-07 PROCEDURE — 6370000000 HC RX 637 (ALT 250 FOR IP): Performed by: NURSE PRACTITIONER

## 2022-09-07 PROCEDURE — 84520 ASSAY OF UREA NITROGEN: CPT

## 2022-09-07 PROCEDURE — 36415 COLL VENOUS BLD VENIPUNCTURE: CPT

## 2022-09-07 PROCEDURE — 82565 ASSAY OF CREATININE: CPT

## 2022-09-07 PROCEDURE — 6360000002 HC RX W HCPCS: Performed by: NURSE PRACTITIONER

## 2022-09-07 PROCEDURE — 99232 SBSQ HOSP IP/OBS MODERATE 35: CPT | Performed by: NURSE PRACTITIONER

## 2022-09-07 PROCEDURE — 6370000000 HC RX 637 (ALT 250 FOR IP): Performed by: FAMILY MEDICINE

## 2022-09-07 PROCEDURE — 2580000003 HC RX 258: Performed by: NURSE PRACTITIONER

## 2022-09-07 PROCEDURE — 99238 HOSP IP/OBS DSCHRG MGMT 30/<: CPT | Performed by: STUDENT IN AN ORGANIZED HEALTH CARE EDUCATION/TRAINING PROGRAM

## 2022-09-07 RX ORDER — MUPIROCIN CALCIUM 20 MG/G
CREAM TOPICAL
Qty: 15 G | Refills: 1 | Status: SHIPPED | OUTPATIENT
Start: 2022-09-07 | End: 2022-10-07

## 2022-09-07 RX ORDER — SULFAMETHOXAZOLE AND TRIMETHOPRIM 800; 160 MG/1; MG/1
1 TABLET ORAL 2 TIMES DAILY
Qty: 14 TABLET | Refills: 0 | Status: SHIPPED | OUTPATIENT
Start: 2022-09-07 | End: 2022-09-12

## 2022-09-07 RX ORDER — HYDROCODONE BITARTRATE AND ACETAMINOPHEN 5; 325 MG/1; MG/1
1 TABLET ORAL EVERY 4 HOURS PRN
Qty: 6 TABLET | Refills: 0 | Status: SHIPPED | OUTPATIENT
Start: 2022-09-07 | End: 2022-09-10

## 2022-09-07 RX ADMIN — TRAMADOL HYDROCHLORIDE 50 MG: 50 TABLET, COATED ORAL at 09:37

## 2022-09-07 RX ADMIN — PROBIOTIC PRODUCT - TAB 1 TABLET: TAB at 07:59

## 2022-09-07 RX ADMIN — SODIUM CHLORIDE, PRESERVATIVE FREE 10 ML: 5 INJECTION INTRAVENOUS at 08:00

## 2022-09-07 RX ADMIN — OXYCODONE HYDROCHLORIDE 10 MG: 10 TABLET, FILM COATED, EXTENDED RELEASE ORAL at 07:59

## 2022-09-07 RX ADMIN — PROBIOTIC PRODUCT - TAB 1 TABLET: TAB at 14:22

## 2022-09-07 RX ADMIN — TRAMADOL HYDROCHLORIDE 50 MG: 50 TABLET, COATED ORAL at 03:47

## 2022-09-07 RX ADMIN — KETOROLAC TROMETHAMINE 30 MG: 30 INJECTION, SOLUTION INTRAMUSCULAR; INTRAVENOUS at 14:22

## 2022-09-07 RX ADMIN — TRAMADOL HYDROCHLORIDE 50 MG: 50 TABLET, COATED ORAL at 15:16

## 2022-09-07 RX ADMIN — VANCOMYCIN HYDROCHLORIDE 1500 MG: 5 INJECTION, POWDER, LYOPHILIZED, FOR SOLUTION INTRAVENOUS at 09:26

## 2022-09-07 RX ADMIN — SODIUM CHLORIDE: 9 INJECTION, SOLUTION INTRAVENOUS at 01:13

## 2022-09-07 RX ADMIN — KETOROLAC TROMETHAMINE 30 MG: 30 INJECTION, SOLUTION INTRAMUSCULAR; INTRAVENOUS at 05:31

## 2022-09-07 ASSESSMENT — ENCOUNTER SYMPTOMS
CONSTIPATION: 0
ABDOMINAL PAIN: 0
CHEST TIGHTNESS: 0
DIARRHEA: 0
SHORTNESS OF BREATH: 0
VOMITING: 0
COUGH: 0
NAUSEA: 0
GASTROINTESTINAL NEGATIVE: 1
WHEEZING: 0
RESPIRATORY NEGATIVE: 1
BLOOD IN STOOL: 0

## 2022-09-07 ASSESSMENT — PAIN DESCRIPTION - LOCATION
LOCATION: ARM;BUTTOCKS
LOCATION: BUTTOCKS

## 2022-09-07 ASSESSMENT — PAIN SCALES - GENERAL
PAINLEVEL_OUTOF10: 7
PAINLEVEL_OUTOF10: 8
PAINLEVEL_OUTOF10: 7
PAINLEVEL_OUTOF10: 8
PAINLEVEL_OUTOF10: 7
PAINLEVEL_OUTOF10: 8
PAINLEVEL_OUTOF10: 8
PAINLEVEL_OUTOF10: 5
PAINLEVEL_OUTOF10: 5
PAINLEVEL_OUTOF10: 6
PAINLEVEL_OUTOF10: 6
PAINLEVEL_OUTOF10: 0

## 2022-09-07 ASSESSMENT — PAIN DESCRIPTION - ONSET
ONSET: ON-GOING

## 2022-09-07 ASSESSMENT — PAIN DESCRIPTION - ORIENTATION
ORIENTATION: LEFT;INNER;UPPER
ORIENTATION: LEFT
ORIENTATION: LEFT;INNER;UPPER
ORIENTATION: LEFT

## 2022-09-07 ASSESSMENT — PAIN DESCRIPTION - DESCRIPTORS
DESCRIPTORS: THROBBING;SHARP
DESCRIPTORS: SHARP
DESCRIPTORS: SHARP
DESCRIPTORS: ACHING;THROBBING
DESCRIPTORS: ACHING;THROBBING
DESCRIPTORS: ACHING

## 2022-09-07 ASSESSMENT — PAIN DESCRIPTION - FREQUENCY
FREQUENCY: CONTINUOUS

## 2022-09-07 ASSESSMENT — PAIN DESCRIPTION - PAIN TYPE
TYPE: ACUTE PAIN;SURGICAL PAIN
TYPE: SURGICAL PAIN;ACUTE PAIN
TYPE: ACUTE PAIN;SURGICAL PAIN
TYPE: ACUTE PAIN
TYPE: ACUTE PAIN;SURGICAL PAIN
TYPE: ACUTE PAIN

## 2022-09-07 NOTE — PROGRESS NOTES
General Surgery:  Daily Progress Note                    PATIENT NAME: Hyun Louise     TODAY'S DATE: 9/7/2022, 5:37 AM  CC:  Buttock pain    SUBJECTIVE:     Pt seen and examined at bedside. Patient afebrile with normal vital signs overnight. He is reporting pain in his gluteal region where the incision and drainage was performed yesterday. Denies nausea, vomiting, chest pain, shortness of breath. He does have a small abscess in his armpit as well that has caused some discomfort. IV antibiotics running. Patient tolerating regular diet. OBJECTIVE:   VITALS:  /65   Pulse 89   Temp 98.1 °F (36.7 °C) (Oral)   Resp 18   Ht 5' 9\" (1.753 m)   Wt 172 lb 6 oz (78.2 kg)   SpO2 95%   BMI 25.46 kg/m²      INTAKE/OUTPUT:      Intake/Output Summary (Last 24 hours) at 9/7/2022 0537  Last data filed at 9/7/2022 0200  Gross per 24 hour   Intake 2109.5 ml   Output 2925 ml   Net -815.5 ml       PHYSICAL EXAM:  General Appearance: awake, alert, oriented, in no acute distress  HEENT:  Normocephalic, atraumatic, mucus membranes moist   Heart: Heart regular rate and rhythm  Lungs: Normal expansion. Clear to auscultation. No rales, rhonchi, or wheezing. Abdomen: soft, non-distended, non-tender   Incision: Packing in place, mild surrounding erythema, very tender to palpation   Extremities: No cyanosis, pitting edema, rashes noted.     Skin: 1 cm x 1 cm abscess in left axilla without surrounding erythema    Data:  CBC with Differential:    Lab Results   Component Value Date/Time    WBC 11.3 09/05/2022 05:55 AM    RBC 4.07 09/05/2022 05:55 AM    HGB 12.0 09/05/2022 05:55 AM    HCT 37.7 09/05/2022 05:55 AM     09/05/2022 05:55 AM    MCV 92.6 09/05/2022 05:55 AM    MCH 29.5 09/05/2022 05:55 AM    MCHC 31.8 09/05/2022 05:55 AM    RDW 13.1 09/05/2022 05:55 AM    LYMPHOPCT 10 09/04/2022 06:31 PM    MONOPCT 11 09/04/2022 06:31 PM    BASOPCT 0 09/04/2022 06:31 PM    MONOSABS 1.58 09/04/2022 06:31 PM    LYMPHSABS 1.44 09/04/2022 06:31 PM    EOSABS 0.00 09/04/2022 06:31 PM    BASOSABS 0.00 09/04/2022 06:31 PM     BMP:    Lab Results   Component Value Date/Time     09/05/2022 05:55 AM    K 3.9 09/05/2022 05:55 AM     09/05/2022 05:55 AM    CO2 25 09/05/2022 05:55 AM    BUN 12 09/05/2022 05:55 AM    CREATININE 0.84 09/05/2022 05:55 AM    CALCIUM 8.6 09/05/2022 05:55 AM    GFRAA >60 09/05/2022 05:55 AM    LABGLOM >60 09/05/2022 05:55 AM    GLUCOSE 107 09/05/2022 05:55 AM       Radiology Review:    CT PELVIS W CONTRAST Additional Contrast? None   Final Result   Medial left gluteal region skin thickening and subcutaneous edema compatible   cellulitis. No soft tissue fluid collection or soft tissue gas to suggest   abscess. ASSESSMENT:  Active Hospital Problems    Diagnosis Date Noted    Abscess, gluteal, left [L02.31] 09/06/2022     Priority: Medium    Infection of skin due to methicillin resistant Staphylococcus aureus (MRSA) [A49.02] 09/06/2022     Priority: Medium    Boil, scrotum [N49.2] 09/05/2022     Priority: Medium    Recurrent infection of skin [L08.9] 09/05/2022     Priority: Medium    Cellulitis [L03.90] 09/04/2022     Priority: Medium    Attention deficit hyperactivity disorder (ADHD) [F90.9] 11/11/2021    Generalized anxiety disorder [F41.1] 09/03/2021    Moderate episode of recurrent major depressive disorder ([facility] [F33.1] 09/03/2021       39 y.o. male with mica-anal abscess and hx of MRSA cellulitis    Plan:  Diet: Regular diet  Analgesia:  Oxycontin, tylenol, toradol  Wound care: RN to change packing to buttock wound after giving pain medication today  Continue antibiotics, will need to d/c home on oral antibiotics  DC planning:  Pain control, packing change, possible D/c today or tomorrow. Electronically signed by Vega Torres DO  on 9/7/2022 at 5:37 AM     Attending Physician Statement  I have discussed the case, including pertinent history and exam findings with the resident.  I agree with the assessment, plan and orders as documented by the resident. Patient seen and examined. Agree with above. Local wound care.

## 2022-09-07 NOTE — PROGRESS NOTES
[facility]  Progress Note    Sravan Nur  MRN: [id number]  Date: 9/7/2022  LOS: 3     Reason for F/U :   Skin and soft tissue infection    Impression :   Recurrent from calluses/skin and soft tissue infection related to MRSA and currently has a small left axillary lesion as well as a left gluteal abscess  S/p bedside I&D of the left gluteal abscess  MRSA colonization-most likely given the recurrent infections    Recommendations: The patient continues on IV antimicrobial therapy with vancomycin  Plan for outpatient decolonization protocol as follows: Hibiclens 3 times weekly, mupirocin ointment to the nares 3 times daily for 5 days and repeating this monthly  Cultures are pending gram stain thus far with gram-positive cocci in clusters, this likely represents an MRSA infection  He can be discharged on bactrim    Infection Control Recommendations:   Contact precautions    Discharge Planning:   Estimated Length of IV antimicrobials: To be determined  Patient will need Midline Catheter Insertion/ PICC line Insertion: No  Patient will need: Home IV , [place],  SNF,  LTAC: Undetermined  Patient willneed outpatient wound care: No    Medical Decision making / Summary of Stay:   Sravan Nur is a 39y.o.-year-old male who was initially admitted on 9/4/2022. [facility] has a history of depression/ADHD and was seen in June 2022 with a right upper extremity/forearm pustular cellulitis as well as a right knee soft tissue abscess secondary to MRSA. The patient has had recurrent abscesses and has a large painful abscess to the gluteal area as well as small pustular papules and nodules in the axilla and scrotum. He had recently been seen at an urgent care center and started on doxycycline and has not had any improvement. The patient presented to the emergency department and has since been admitted.   There has been some discussion for working him up for an immunodeficiency and was seen by  Heuring from dermatology. He was started on IV antimicrobial therapy with vancomycin I was asked to evaluate and help with antibiotic choice. Current evaluation:2022    /66   Pulse 73   Temp 97.6 °F (36.4 °C) (Oral)   Resp 16   Ht 5' 9\" (1.753 m)   Wt 172 lb 6 oz (78.2 kg)   SpO2 95%   BMI 25.46 kg/m²     Temperature Range: Temp: 97.6 °F (36.4 °C) Temp  Av.4 °F (36.9 °C)  Min: 97.6 °F (36.4 °C)  Max: 99.1 °F (37.3 °C)    The patient was seen and evaluated sitting up in the bed, states he feels well, but does still have pain in the left axilla as well as left buttock    Review of Systems   Constitutional: Negative. HENT: Negative. Respiratory: Negative. Cardiovascular: Negative. Gastrointestinal: Negative. Genitourinary: Negative. Musculoskeletal: Negative. Skin:         Painful abscess on buttock and left axilla   Neurological: Negative. Psychiatric/Behavioral: Negative. Physical Examination :     Physical Exam  Constitutional:       Appearance: Normal appearance. He is normal weight. HENT:      Head: Normocephalic and atraumatic. Pulmonary:      Effort: Pulmonary effort is normal. No respiratory distress. Musculoskeletal:         General: Normal range of motion. Skin:     General: Skin is warm and dry. Comments: Left axilla pustular lesion with no surrounding erythema, tender to touch  Left buttock lesion with incision, purulence expressed, minimal surrounding erythema, tender   Neurological:      General: No focal deficit present. Mental Status: He is alert and oriented to person, place, and time. Mental status is at baseline. Psychiatric:         Mood and Affect: Mood normal.         Behavior: Behavior normal.         Thought Content:  Thought content normal.       Laboratory data:   I have independently reviewed the followinglabs:  CBC with Differential:   Recent Labs     22  1831 22  0555   WBC 14.4* 11.3   HGB 13.0 12.0*   HCT 39.7* 37.7*    156   LYMPHOPCT 10*  --    MONOPCT 11  --      BMP:   Recent Labs     09/04/22  1831 09/05/22  0555 09/07/22  0633   * 137  --    K 4.0 3.9  --    CL 99 105  --    CO2 25 25  --    BUN 14 12 7   CREATININE 0.83 0.84 0.72     Hepatic Function Panel: No results for input(s): PROT, LABALBU, BILIDIR, IBILI, BILITOT, ALKPHOS, ALT, AST in the last 72 hours. Lab Results   Component Value Date/Time    PROCAL <0.02 09/04/2022 06:31 PM     Lab Results   Component Value Date/Time    CRP 6.1 06/01/2022 05:37 PM     Lab Results   Component Value Date    SEDRATE 22 (H) 06/01/2022         No results found for: DDIMER  No results found for: FERRITIN  No results found for: LDH  No results found for: FIBRINOGEN    No results found for requested labs within last 30 days. No results found for: COVID19    No results for input(s): VANCOTROUGH in the last 72 hours. Imaging Studies:   No new imaging    Cultures:     Culture, Wound Aerobic Only [[id number]] (Abnormal) Collected: 09/06/22 1550   Order Status: Completed Specimen: Buttock Updated: 09/07/22 1000    Specimen Description . BUTTOCK L    Direct Exam MANY NEUTROPHILS Abnormal      FEW GRAM POSITIVE COCCI IN CLUSTERS Abnormal     Culture CULTURE IN PROGRESS   Culture, Blood 2 [[id number]] Collected: 09/04/22 1843   Order Status: Completed Specimen: Blood Updated: 09/06/22 2328    Specimen Description . BLOOD    Special Requests 1ML R HAND    Culture NO GROWTH 2 DAYS   Culture, Blood 1 [[id number]] Collected: 09/04/22 1831   Order Status: Completed Specimen: Blood Updated: 09/06/22 2325    Specimen Description . BLOOD    Special Requests RAC 10ML    Culture NO GROWTH 2 DAYS   Culture, Wound Aerobic Only [[id number]] (Abnormal) Collected: 09/06/22 1221   Order Status: Completed Specimen: Abscess Updated: 09/06/22 1501    Specimen Description . ABSCESS L BUTTOCK    Direct Exam MANY NEUTROPHILS Abnormal      MODERATE GRAM POSITIVE COCCI IN CLUSTERS Abnormal     Culture PENDING       Medications:      vancomycin  1,500 mg IntraVENous Q12H    fentanNYL  50 mcg IntraVENous Once    amphetamine-dextroamphetamine  30 mg Oral Daily    oxyCODONE  10 mg Oral 2 times per day    cloNIDine  0.2 mg Oral BID    sodium chloride flush  5-40 mL IntraVENous 2 times per day    enoxaparin  40 mg SubCUTAneous Daily    lactobacillus  1 tablet Oral TID WC    vancomycin (VANCOCIN) intermittent dosing (placeholder)   Other RX Placeholder           [facility]  JANESSA Verma CNP  [employer] messaging  OFFICE: [phone]      Electronically signed by JANESSA Verma CNP on 9/7/2022 at 9:59 AM  Thank you for allowing us to participate in the care of this patient. Please call with questions. This note iscreated with the assistance of a speech recognition program.  While intending to generate a document that actually reflects the content of the visit, the document can still have some errors including those of syntax andsound a like substitutions which may escape proof reading. In such instances, actual meaning can be extrapolated by contextual diversion.

## 2022-09-07 NOTE — DISCHARGE INSTRUCTIONS
Change packing daily and put fresh fluffs on the incision site.   Hibiclens 3 times weekly, mupirocin ointment to the nares 3 times daily for 5 days and repeating this monthly

## 2022-09-07 NOTE — PLAN OF CARE
If on nasal continuous positive airway pressure, respiratory therapy assess nares and determine need for appliance change or resting period.   9/7/2022 0228 by Rush Grande RN  Outcome: Progressing  9/6/2022 2348 by Jaime Ricci RN  Outcome: Progressing  9/6/2022 1913 by Collins Olivares RN  Outcome: Not Progressing     Problem: Infection - Adult  Goal: Absence of infection at discharge  9/7/2022 0228 by Rush Grande RN  Outcome: Progressing  9/6/2022 2348 by Jaime Ricci RN  Outcome: Progressing  9/6/2022 1913 by Collins Olivares RN  Outcome: Not Progressing  Goal: Absence of infection during hospitalization  9/7/2022 0228 by Rush Grande RN  Outcome: Progressing  9/6/2022 2348 by Jaime Ricci RN  Outcome: Progressing  9/6/2022 1913 by Collins Olivares RN  Outcome: Not Progressing  Goal: Absence of fever/infection during anticipated neutropenic period  9/7/2022 0228 by Rush Grande RN  Outcome: Progressing  9/6/2022 2348 by Jaime Ricci RN  Outcome: Progressing  9/6/2022 1913 by Collins Olivares RN  Outcome: Not Progressing     Problem: Discharge Planning  Goal: Discharge to home or other facility with appropriate resources  9/7/2022 0228 by Rush Grande RN  Outcome: Progressing  9/6/2022 2348 by Jaime Ricci RN  Outcome: Progressing  9/6/2022 1913 by Collins Olivares RN  Outcome: Not Progressing  Flowsheets (Taken 9/6/2022 0209)  Discharge to home or other facility with appropriate resources:   Identify barriers to discharge with patient and caregiver   Arrange for needed discharge resources and transportation as appropriate   Identify discharge learning needs (meds, wound care, etc)   Refer to discharge planning if patient needs post-hospital services based on physician order or complex needs related to functional status, cognitive ability or social support system     Problem: Pain  Goal: Verbalizes/displays adequate comfort level or baseline comfort level  9/7/2022 0228 by Rush Grande RN  Outcome: Progressing  9/6/2022 2348 by Catarino Man RN  Outcome: Progressing  9/6/2022 1913 by Alber Godoy RN  Outcome: Not Progressing     Problem: ABCDS Injury Assessment  Goal: Absence of physical injury  9/7/2022 0228 by Melody Barrera RN  Outcome: Progressing  9/6/2022 2348 by Catarino Man RN  Outcome: Progressing  Flowsheets  Taken 9/6/2022 2135 by Melody Barrera RN  Absence of Physical Injury: Implement safety measures based on patient assessment  Taken 9/6/2022 1914 by Alber Godoy RN  Absence of Physical Injury: Implement safety measures based on patient assessment  9/6/2022 1913 by Alber Godoy RN  Outcome: Not Progressing  Flowsheets (Taken 9/6/2022 0614 by Kamala Garza RN)  Absence of Physical Injury: Implement safety measures based on patient assessment     Problem: Skin/Tissue Integrity  Goal: Absence of new skin breakdown  Description: 1. Monitor for areas of redness and/or skin breakdown  2. Assess vascular access sites hourly  3. Every 4-6 hours minimum:  Change oxygen saturation probe site  4. Every 4-6 hours:  If on nasal continuous positive airway pressure, respiratory therapy assess nares and determine need for appliance change or resting period.   9/7/2022 0228 by Melody Barrera RN  Outcome: Progressing  9/6/2022 2348 by Catarino Man RN  Outcome: Progressing  9/6/2022 1913 by Alber Godoy RN  Outcome: Not Progressing     Problem: Infection - Adult  Goal: Absence of infection at discharge  9/7/2022 0228 by Melody Barrera RN  Outcome: Progressing  9/6/2022 2348 by Catarino Man RN  Outcome: Progressing  9/6/2022 1913 by Alber Godoy RN  Outcome: Not Progressing  Goal: Absence of infection during hospitalization  9/7/2022 0228 by Melody Barrera RN  Outcome: Progressing  9/6/2022 2348 by Catarino Man RN  Outcome: Progressing  9/6/2022 1913 by Alber Godoy RN  Outcome: Not Progressing  Goal: Absence of fever/infection during anticipated neutropenic period  9/7/2022 0228 by Alok Lemons Angela Barry RN  Outcome: Progressing  9/6/2022 2348 by Debby Gold RN  Outcome: Progressing  9/6/2022 1913 by Lilli Shabazz RN  Outcome: Not Progressing

## 2022-09-07 NOTE — PROGRESS NOTES
Patient discharged via wheelchair to home with all his belongings in stable condition. Bactrim was picked from our pharmacy and patient was instructed to pick other med orders from his [address] in [facility]. Patient was send home with some dressing supplies for daily dressing changes.  Patient understood and signed AVS.

## 2022-09-07 NOTE — PLAN OF CARE
Problem: Discharge Planning  Goal: Discharge to home or other facility with appropriate resources  9/7/2022 1059 by Rashad Long RN  Outcome: Progressing  Flowsheets (Taken 9/7/2022 0805)  Discharge to home or other facility with appropriate resources:   Identify barriers to discharge with patient and caregiver   Arrange for needed discharge resources and transportation as appropriate   Identify discharge learning needs (meds, wound care, etc)  9/7/2022 0228 by Skye López RN  Outcome: Progressing  9/6/2022 2348 by Yvan Garza RN  Outcome: Progressing     Problem: Pain  Goal: Verbalizes/displays adequate comfort level or baseline comfort level  9/7/2022 1059 by Rashad Long RN  Outcome: Progressing  Flowsheets (Taken 9/7/2022 0759)  Verbalizes/displays adequate comfort level or baseline comfort level:   Assess pain using appropriate pain scale   Administer analgesics based on type and severity of pain and evaluate response   Implement non-pharmacological measures as appropriate and evaluate response   Encourage patient to monitor pain and request assistance  9/7/2022 0228 by Skye López RN  Outcome: Progressing  9/6/2022 2348 by Yvan Garza RN  Outcome: Progressing     Problem: ABCDS Injury Assessment  Goal: Absence of physical injury  9/7/2022 1059 by Rashad Long RN  Outcome: Progressing  9/7/2022 0228 by Skye López RN  Outcome: Progressing  9/6/2022 2348 by Yvan Garza RN  Outcome: Progressing  Flowsheets  Taken 9/6/2022 2135 by Skye López RN  Absence of Physical Injury: Implement safety measures based on patient assessment  Taken 9/6/2022 1914 by Helga Boo RN  Absence of Physical Injury: Implement safety measures based on patient assessment     Problem: Skin/Tissue Integrity  Goal: Absence of new skin breakdown  Description: 1. Monitor for areas of redness and/or skin breakdown  2. Assess vascular access sites hourly  3.   Every 4-6 hours minimum:  Change oxygen saturation probe site  4. Every 4-6 hours:  If on nasal continuous positive airway pressure, respiratory therapy assess nares and determine need for appliance change or resting period.   9/7/2022 1059 by Jonah Mina RN  Outcome: Progressing  9/7/2022 0228 by Arlene Francis RN  Outcome: Progressing  9/6/2022 2348 by Tony Patel RN  Outcome: Progressing     Problem: Infection - Adult  Goal: Absence of infection at discharge  9/7/2022 1059 by Jonah Mina RN  Outcome: Progressing  9/7/2022 0228 by Arlene Francis RN  Outcome: Progressing  9/6/2022 2348 by Tony Patel RN  Outcome: Progressing  Goal: Absence of infection during hospitalization  9/7/2022 1059 by Jonah Mina RN  Outcome: Progressing  9/7/2022 0228 by Arlene Francis RN  Outcome: Progressing  9/6/2022 2348 by Tony Patel RN  Outcome: Progressing  Goal: Absence of fever/infection during anticipated neutropenic period  9/7/2022 1059 by Jonah Mina RN  Outcome: Progressing  9/7/2022 0228 by Arlene Francis RN  Outcome: Progressing  9/6/2022 2348 by Tony Patel RN  Outcome: Progressing

## 2022-09-07 NOTE — PROGRESS NOTES
[facility]  Office: [address], DO, Bashir Cruz, DO, Enoc Urrutia, DO, Eve Moffett, DO, Art Becerril MD, Melvin Adams MD, Consuelo Caba MD, Brianna Lucia MD,  Trevor Francisco MD, Princess Marcial MD, Prabha Calderon, DO, Jeni Cuba MD,  Dano Rivers MD, Sapphire Ramey MD, Rand Marie, DO, Ata Hayes MD, Estevan Sy MD, Sean Douglas MD, Anthony Jerez MD, Jyothi Shafer MD, Missael Coombs MD, Yemi Iglesias, DO, Amauri Burdick MD, Compa Ruelas MD, Heike Alvarez, CNP,  Sesar Crain, CNP, Ronaldo Vega, CNP, Servando Floyd, CNP, Alice Boss, PA-C, Eugenie Stubbs, DNP, Anatoliy Matute, CNP, Amisha Blue, CNP, Idris Hendrix, CNP, Maryana Cardenas, CNP, Antonio Schafer, CNP, Rudy Bell, CNS, Burke Mcbride, [facility], Concha Campoverde, CNP, Marcello Blackwood, CNP, Ivory Goodman, [id number]    Progress Note    9/7/2022    7:52 AM    Name:   Rashad Ayala  MRN:     [id number]     Acct:      [de-identified]   Room:   2018/2018-02  IP Day:  3  Admit Date:  9/4/2022  5:46 PM    PCP:   Compa Craven MD  Code Status:  Full Code    Subjective:     C/C:   Chief Complaint   Patient presents with    Skin Problem     Interval History Status: not changed. Status postoperative day #1 for bedside incision and drainage of complex left gluteal abscess.     Moderate gram-positive cocci in clusters may benefit from Bactrim/doxycycline combo    Will defer to ID for antibiotic choice but can likely go with oral antibiotic today    Brief History:       39 M with history of recurrent furunculosis    Scheduled to have bedside I&D today    Currently on abx tailored with assistance of ID    Had failed course of OP abx with doxycycline week prior to admission  Has had these infections without pause since April and has been evaluated by Dr. 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 MSSA and actinetobacter from skin cultures - ivermectin treatment keflex topical gent and triamcinolone were previous derm therapies      Review of Systems:     Review of Systems   Constitutional:  Positive for activity change. Negative for chills, diaphoresis and fever. HENT:  Negative for congestion. Eyes:  Negative for visual disturbance. Respiratory:  Negative for cough, chest tightness, shortness of breath and wheezing. Cardiovascular:  Negative for chest pain, palpitations and leg swelling. Gastrointestinal:  Negative for abdominal pain, blood in stool, constipation, diarrhea, nausea and vomiting. Genitourinary:  Negative for difficulty urinating. Skin:  Positive for wound. Neurological:  Negative for dizziness, weakness, light-headedness, numbness and headaches. Psychiatric/Behavioral:  Positive for dysphoric mood (Patient starting frustrated from the recurrent skin infections). All other systems reviewed and are negative. Medications:      Allergies:  No Known Allergies    Current Meds:   Scheduled Meds:    fentanNYL  50 mcg IntraVENous Once    amphetamine-dextroamphetamine  30 mg Oral Daily    oxyCODONE  10 mg Oral 2 times per day    vancomycin  1,250 mg IntraVENous Q12H    cloNIDine  0.2 mg Oral BID    sodium chloride flush  5-40 mL IntraVENous 2 times per day    enoxaparin  40 mg SubCUTAneous Daily    lactobacillus  1 tablet Oral TID WC    vancomycin (VANCOCIN) intermittent dosing (placeholder)   Other RX Placeholder     Continuous Infusions:    sodium chloride 75 mL/hr at 09/07/22 0724    sodium chloride       PRN Meds: traMADol, sodium chloride flush, hydrOXYzine HCl, sodium chloride flush, sodium chloride, potassium chloride **OR** potassium alternative oral replacement **OR** potassium chloride, magnesium sulfate, ondansetron **OR** ondansetron, polyethylene glycol, acetaminophen **OR** acetaminophen, ketorolac    Data:     Past Medical History:   has a past medical history of ADHD (attention deficit hyperactivity disorder) and Depression. Social History:   reports that he has never smoked. He has never used smokeless tobacco. He reports that he does not drink alcohol and does not use drugs. Family History:   Family History   Problem Relation Age of Onset    Rheum Arthritis Mother     Depression Mother     No Known Problems Father     No Known Problems Sister     No Known Problems Brother        Vitals:  /65   Pulse 89   Temp 98.1 °F (36.7 °C) (Oral)   Resp 18   Ht 5' 9\" (1.753 m)   Wt 172 lb 6 oz (78.2 kg)   SpO2 95%   BMI 25.46 kg/m²   Temp (24hrs), Av.6 °F (37 °C), Min:98.1 °F (36.7 °C), Max:99.1 °F (37.3 °C)    No results for input(s): POCGLU in the last 72 hours. I/O (24Hr):     Intake/Output Summary (Last 24 hours) at 2022 0752  Last data filed at 2022 0724  Gross per 24 hour   Intake 2410.52 ml   Output 2925 ml   Net -514.48 ml         Labs:  Hematology:  Recent Labs     22  0555   WBC 14.4* 11.3   RBC 4.38 4.07*   HGB 13.0 12.0*   HCT 39.7* 37.7*   MCV 90.6 92.6   MCH 29.7 29.5   MCHC 32.7 31.8   RDW 13.1 13.1    156   MPV 10.1 9.8       Chemistry:  Recent Labs     22  1831 22  0555 22  0633   * 137  --    K 4.0 3.9  --    CL 99 105  --    CO2 25 25  --    GLUCOSE 98 107*  --    BUN 14 12 7   CREATININE 0.83 0.84 0.72   ANIONGAP 10 7*  --    LABGLOM >60 >60 >60   GFRAA >60 >60 >60   CALCIUM 8.9 8.6  --      Recent Labs     22  1831   LABA1C 5.3       ABG:No results found for: POCPH, PHART, PH, POCPCO2, UFT8QJF, PCO2, POCPO2, PO2ART, PO2, POCHCO3, GOR8UET, HCO3, NBEA, PBEA, BEART, BE, THGBART, THB, CXC7NML, TBJO1DOE, W9YFNQPQ, O2SAT, FIO2  Lab Results   Component Value Date/Time SPECIAL 1ML R HAND 09/04/2022 06:43 PM     Lab Results   Component Value Date/Time    CULTURE PENDING 09/06/2022 03:50 PM       Radiology:  CT PELVIS W CONTRAST Additional Contrast? None    Result Date: 9/4/2022  Medial left gluteal region skin thickening and subcutaneous edema compatible cellulitis. No soft tissue fluid collection or soft tissue gas to suggest abscess. Physical Examination:        Physical Exam  Vitals and nursing note reviewed. Constitutional:       General: He is not in acute distress. HENT:      Head: Normocephalic and atraumatic. Eyes:      Conjunctiva/sclera: Conjunctivae normal.      Pupils: Pupils are equal, round, and reactive to light. Cardiovascular:      Rate and Rhythm: Normal rate and regular rhythm. Heart sounds: No murmur heard. Pulmonary:      Effort: Pulmonary effort is normal. No accessory muscle usage or respiratory distress. Breath sounds: No stridor. No decreased breath sounds, wheezing, rhonchi or rales. Abdominal:      General: Bowel sounds are normal. There is no distension. Palpations: Abdomen is soft. Abdomen is not rigid. Tenderness: There is no abdominal tenderness. There is no guarding. Musculoskeletal:         General: No tenderness. Skin:     General: Skin is warm and dry. Findings: Erythema and lesion present. No rash. Comments: Please see pics attached here from media     Neurological:      Mental Status: He is alert and oriented to person, place, and time. Cranial Nerves: No cranial nerve deficit. Motor: No seizure activity. Psychiatric:         Speech: Speech normal.         Behavior: Behavior normal. Behavior is cooperative.      Perianal abscess looks worse today              Assessment:        Hospital Problems             Last Modified POA    * (Principal) Cellulitis 9/4/2022 Yes    Boil, scrotum 9/5/2022 Yes    Recurrent infection of skin 9/5/2022 Yes    Abscess, gluteal, left 9/6/2022 Yes Infection of skin due to methicillin resistant Staphylococcus aureus (MRSA) 9/6/2022 Yes    Generalized anxiety disorder 9/5/2022 Yes    Moderate episode of recurrent major depressive disorder ([address] 9/5/2022 Yes    Attention deficit hyperactivity disorder (ADHD) 9/5/2022 Yes     Plan:        Gluteal cellulitis, scrotal skin boils : -Patient with known recurrent skin infections, Dr. Shanice Briones in 2020 office f/u  POD #1 bedside I&D - on vancomycin can switch to PO today  -Patient did have right hand infection that grew MRSA in June 2022.  -Patient does not have predisposing skin disorder.  -At this point possibilities of recurrent infections growing staph might be bacteremia, IV drug use versus immunodeficiency  Abx appreciate ID input  Pain control scheduled and PRN narcotic  I&D this afternoon bedside  No linezolid due to SSRI interaction and poor abscess penetrance    Depression: discontinue Lexapro, patient reports not taking        Clarisa Wahl MD  9/7/2022  7:52 AM

## 2022-09-07 NOTE — CONSULTS
[facility] Pharmacokinetic Monitoring Service - Vancomycin     Santos Rust is a 39 y.o. male starting on vancomycin therapy for SSTI. Pharmacy consulted by JANESSA Weston CNP  for monitoring and adjustment. DAY OF THERAPY: 4    Target Concentration: Goal trough of 10-15 mg/L and AUC/ABEBE <500 mg*hr/L    Additional Antimicrobials: none    Pertinent Laboratory Values: Wt Readings from Last 1 Encounters:   09/07/22 172 lb 6 oz (78.2 kg)     Temp Readings from Last 1 Encounters:   09/07/22 98.1 °F (36.7 °C) (Oral)     Estimated Creatinine Clearance: 135 mL/min (based on SCr of 0.72 mg/dL). Recent Labs     09/04/22  1831 09/05/22  0555 09/07/22  0633   CREATININE 0.83 0.84 0.72   WBC 14.4* 11.3  --      Procalcitonin: ---    Pertinent Cultures:  Culture Date Source Results   9/4/22 9/6/22 Blood x2  Culture wound No growth x 2 days  Few   Gm+ cocci in clusters   MRSA Nasal Swab: N/A. Non-respiratory infection. Plan:  SCr continues to improve. Level drawn 9/7/22 06:33 was 11.2. Per Bayesian calculations level remains sub-therapeutic.   Will increase dose to 1500 mg q12h for anticipated  and trough 11.4  Redraw level 9/8 @ 0600  Pharmacy will continue to monitor patient and adjust therapy as indicated    Thank you for the consult,  POLO Hussein [facility]  9/7/2022 7:18 AM

## 2022-09-07 NOTE — PROGRESS NOTES
CLINICAL PHARMACY NOTE: MEDS TO BEDS    Total # of Prescriptions Filled: 1   The following medications were delivered to the patient:  Sulfamethoxazole-trimethoprim 800-160 mg tabs    Additional Documentation:    $13.98 cash price (pt did not have insurance). The pt's family member came down to the pharmacy to pay for and  the med.

## 2022-09-07 NOTE — DISCHARGE SUMMARY
[facility]  Office: [address], DO, Christina [place], DO, Essence Dumont, DO, Maddy Carcamo Blood, DO, Cm Diaz MD, Jennifer Link MD, Boaz Trejo MD, Howie Lopez MD,  Cecil José MD, Ga Reis MD, Gifty Miles, DO, Savannah Smith MD,  Celso Rodriguez MD, Mindy Rinaldi MD, Elias Multani, DO, Chente Alaniz MD, Praneeth Lema MD, Flor Muir MD, Gracy Gaviria MD, Rony Segal MD, Crissy Johnson MD, Gila Khoury, DO, Nellie Mccurdy MD, Eli Mackay MD, Leticia Calabrese, CNP,  Maggi Escalona, CNP, Zenia Negus, CNP, Stacie Dianna, CNP, Leroy Green PA-C, Leighann Camacho, [facility], Je Marshall, CNP, Eleanor Reaves, CNP, Price [place], CNP, [employer], [facility], Sara Base, CNP, Brandyn Mary, [facility], [facility], [facility], Murtaza Cardenas, CNP, Ebenezer Lopez, [facility], Ranjeet Nurse, [place] [facility]    Discharge Summary     Patient ID: Argelia Rothman  :  1980   MRN: [id number]     ACCOUNT:  [de-identified]   Patient's PCP: Loly Celestin MD  Admit Date: 2022   Discharge Date: 2022     Length of Stay: 3  Code Status:  Full Code  Admitting Physician: Howie Lopez MD  Discharge Physician: Praneeth Lema MD     Active Discharge Diagnoses:     Hospital Problem Lists:  Principal Problem:    Cellulitis  Active Problems:    Boil, scrotum    Recurrent infection of skin    Abscess, gluteal, left    Infection of skin due to methicillin resistant Staphylococcus aureus (MRSA)    Generalized anxiety disorder    Moderate episode of recurrent major depressive disorder (HCC)    Attention deficit hyperactivity disorder (ADHD)  Resolved Problems:    * No resolved hospital problems.  *      Admission Condition:  fair     Discharged Condition: stable    Hospital Stay:     Hospital Course:  Argelia Rothman is a 39 y.o. male who was admitted for the management of  Cellulitis , presented to ER with Skin Problem    Admitted with left gluteal abscess previously failed outpatient antibiotic therapy. Given that he did have a relationship with dermatology and previously been evaluated outpatient they were consulted as well    They recommended decolonization therapy as this is likely related to recurrent MRSA infection. He was also seen by infectious disease and they tailored his antimicrobial therapy initially with vancomycin and then discharging home on Bactrim. He was also seen by general surgery they performed a bedside incision and drainage. He was also given mupirocin and several cleansing agents to go home with      Significant therapeutic interventions: Bedside incision and drainage    Significant Diagnostic Studies:   Labs / Micro:  CBC:   Lab Results   Component Value Date/Time    WBC 11.3 09/05/2022 05:55 AM    RBC 4.07 09/05/2022 05:55 AM    HGB 12.0 09/05/2022 05:55 AM    HCT 37.7 09/05/2022 05:55 AM    MCV 92.6 09/05/2022 05:55 AM    MCH 29.5 09/05/2022 05:55 AM    MCHC 31.8 09/05/2022 05:55 AM    RDW 13.1 09/05/2022 05:55 AM     09/05/2022 05:55 AM        Radiology:  CT PELVIS W CONTRAST Additional Contrast? None    Result Date: 9/4/2022  Medial left gluteal region skin thickening and subcutaneous edema compatible cellulitis. No soft tissue fluid collection or soft tissue gas to suggest abscess. Consultations:    Consults:     Final Specialist Recommendations/Findings:   PHARMACY TO DOSE VANCOMYCIN  IP CONSULT TO HOSPITALIST  PHARMACY TO DOSE VANCOMYCIN  IP CONSULT TO INFECTIOUS DISEASES  IP CONSULT TO DERMATOLOGY  IP CONSULT TO GENERAL SURGERY      The patient was seen and examined on day of discharge and this discharge summary is in conjunction with any daily progress note from day of discharge.     Discharge plan:     Disposition: Home    Physician Follow Up:     Evelyn Trevizo MD  [address] [place] [id number] [phone]    Call  for followup appointment in 1 week       Requiring Further Evaluation/Follow Up POST HOSPITALIZATION/Incidental Findings:     Diet: regular diet    Activity: As tolerated    Instructions to Patient: Follow-up with infectious disease and dermatology    Discharge Medications:      Medication List        START taking these medications      chlorhexidine gluconate 4 % Soln external solution  Commonly known as: ANTISEPTIC SKIN CLEANSER  Apply topically daily as needed (Hibiclens 3 times weekly, repeating this monthly)     HYDROcodone-acetaminophen 5-325 MG per tablet  Commonly known as: Norco  Take 1 tablet by mouth every 4 hours as needed for Pain for up to 3 days. Intended supply: 3 days. Take lowest dose possible to manage pain     mupirocin 2 % cream  Commonly known as: Bactroban  Apply 3 times daily. sulfamethoxazole-trimethoprim 800-160 MG per tablet  Commonly known as: BACTRIM DS;SEPTRA DS  Take 1 tablet by mouth 2 times daily for 7 days            CHANGE how you take these medications      cloNIDine 0.2 MG tablet  Commonly known as: Catapres  Take 1 tablet by mouth 2 times daily  What changed: additional instructions            CONTINUE taking these medications      hydrOXYzine HCl 25 MG tablet  Commonly known as: ATARAX  Take 1 tablet by mouth every 6 hours as needed for Itching            STOP taking these medications      amphetamine-dextroamphetamine 30 MG extended release capsule  Commonly known as:  Adderall XR     diphenhydrAMINE 25 MG capsule  Commonly known as: BENADRYL     escitalopram 20 MG tablet  Commonly known as: Jennifer Carver               Where to Get Your Medications        These medications were sent to [facility] [address], [address] [place] [address] [phone] Nadeen Winslow [phone]  [address] [place] [address]      Phone: [phone]   sulfamethoxazole-trimethoprim 800-160 MG per tablet       These medications were sent to [facility] 21 [id number] - [place] [address]      Phone: [phone]   chlorhexidine gluconate 4 % Soln external solution  HYDROcodone-acetaminophen 5-325 MG per tablet  mupirocin 2 % cream         No discharge procedures on file. Time Spent on discharge is  32 mins in patient examination, evaluation, counseling as well as medication reconciliation, prescriptions for required medications, discharge plan and follow up. Electronically signed by   Abimbola Gamboa MD  9/7/2022  6:08 PM      Thank you Dr. Lori Carrera MD for the opportunity to be involved in this patient's care.

## 2022-09-07 NOTE — PLAN OF CARE
Problem: Discharge Planning  Goal: Discharge to home or other facility with appropriate resources  9/6/2022 2348 by Juan Jose Oro RN  Outcome: Progressing  9/6/2022 1913 by Jo Summers RN  Outcome: Not Progressing  Flowsheets (Taken 9/6/2022 4348)  Discharge to home or other facility with appropriate resources:   Identify barriers to discharge with patient and caregiver   Arrange for needed discharge resources and transportation as appropriate   Identify discharge learning needs (meds, wound care, etc)   Refer to discharge planning if patient needs post-hospital services based on physician order or complex needs related to functional status, cognitive ability or social support system     Problem: Pain  Goal: Verbalizes/displays adequate comfort level or baseline comfort level  9/6/2022 2348 by Juan Jose Oro RN  Outcome: Progressing  9/6/2022 1913 by Jo Summers RN  Outcome: Not Progressing     Problem: ABCDS Injury Assessment  Goal: Absence of physical injury  9/6/2022 2348 by Juan Jose Oro RN  Outcome: Progressing  [facility] (Taken 9/6/2022 1914 by Jo Summers RN)  Absence of Physical Injury: Implement safety measures based on patient assessment  9/6/2022 1913 by Jo Summers RN  Outcome: Not Progressing  Flowsheets (Taken 9/6/2022 0614 by Jones Trevizo RN)  Absence of Physical Injury: Implement safety measures based on patient assessment     Problem: Skin/Tissue Integrity  Goal: Absence of new skin breakdown  Description: 1. Monitor for areas of redness and/or skin breakdown  2. Assess vascular access sites hourly  3. Every 4-6 hours minimum:  Change oxygen saturation probe site  4. Every 4-6 hours:  If on nasal continuous positive airway pressure, respiratory therapy assess nares and determine need for appliance change or resting period.   9/6/2022 2348 by Juan Jose Oro RN  Outcome: Progressing  9/6/2022 1913 by Jo Summers RN  Outcome: Not Progressing     Problem: Infection - Adult  Goal: Absence of infection at discharge  9/6/2022 2348 by Adam Mcgraw RN  Outcome: Progressing  9/6/2022 1913 by Mykel Camacho RN  Outcome: Not Progressing  Goal: Absence of infection during hospitalization  9/6/2022 2348 by Adam Mcgraw RN  Outcome: Progressing  9/6/2022 1913 by Mykel Camacho RN  Outcome: Not Progressing  Goal: Absence of fever/infection during anticipated neutropenic period  9/6/2022 2348 by Adam Mcgraw RN  Outcome: Progressing  9/6/2022 1913 by Mykel Camacho RN  Outcome: Not Progressing     Problem: Discharge Planning  Goal: Discharge to home or other facility with appropriate resources  9/6/2022 2348 by Adam Mcgraw RN  Outcome: Progressing  9/6/2022 1913 by Mykel Camacho RN  Outcome: Not Progressing  Flowsheets (Taken 9/6/2022 8807)  Discharge to home or other facility with appropriate resources:   Identify barriers to discharge with patient and caregiver   Arrange for needed discharge resources and transportation as appropriate   Identify discharge learning needs (meds, wound care, etc)   Refer to discharge planning if patient needs post-hospital services based on physician order or complex needs related to functional status, cognitive ability or social support system     Problem: Pain  Goal: Verbalizes/displays adequate comfort level or baseline comfort level  9/6/2022 2348 by Adam Mcgraw RN  Outcome: Progressing  9/6/2022 1913 by Mykel Camacho RN  Outcome: Not Progressing     Problem: ABCDS Injury Assessment  Goal: Absence of physical injury  9/6/2022 2348 by Adam Mcgraw RN  Outcome: Progressing  Flowsheets (Taken 9/6/2022 1914 by Mykel Camacho RN)  Absence of Physical Injury: Implement safety measures based on patient assessment  9/6/2022 1913 by Mykel Camacho RN  Outcome: Not Progressing  Flowsheets (Taken 9/6/2022 0614 by Cesar Reddy RN)  Absence of Physical Injury: Implement safety measures based on patient assessment     Problem: Skin/Tissue Integrity  Goal: Absence of new skin breakdown  Description: 1. Monitor for areas of redness and/or skin breakdown  2. Assess vascular access sites hourly  3. Every 4-6 hours minimum:  Change oxygen saturation probe site  4. Every 4-6 hours:  If on nasal continuous positive airway pressure, respiratory therapy assess nares and determine need for appliance change or resting period.   9/6/2022 2348 by Virgilio Taveras RN  Outcome: Progressing  9/6/2022 1913 by Mary Monroy RN  Outcome: Not Progressing     Problem: Infection - Adult  Goal: Absence of infection at discharge  9/6/2022 2348 by Virgilio Taveras RN  Outcome: Progressing  9/6/2022 1913 by Mary Monroy RN  Outcome: Not Progressing  Goal: Absence of infection during hospitalization  9/6/2022 2348 by Virgilio Taveras RN  Outcome: Progressing  9/6/2022 1913 by Mary Monroy RN  Outcome: Not Progressing  Goal: Absence of fever/infection during anticipated neutropenic period  9/6/2022 2348 by Virgilio Taveras RN  Outcome: Progressing  9/6/2022 1913 by Mary Monroy RN  Outcome: Not Progressing

## 2022-09-08 LAB
CULTURE: ABNORMAL
DIRECT EXAM: ABNORMAL
DIRECT EXAM: ABNORMAL
SPECIMEN DESCRIPTION: ABNORMAL

## 2022-09-08 NOTE — PROGRESS NOTES
[facility] NOTE    Room # 2018/2018-02   Name: Santos Rust            Advent: Non Mandaeism     Reason for visit: Routine    I visited the patient. Admit Date & Time: 9/4/2022  5:46 PM    Assessment:  Santos Rust is a 39 y.o. male in the hospital because he has cellulitis. Upon entering the room pt. Is found resting on side of the bed. He is cheerful and hopeful that he can go home today. Intervention:  I introduced myself and my title as  I offered space for pt.  to express feelings, needs, and concerns and provided a ministry presence.  offered active listening and emotional support. Outcome:  Pt. Calm and coping. Plan:  Chaplains will remain available to offer spiritual and emotional support as needed.     Electronically signed by Kathy Wray on 9/7/2022 at 8:18 PM.  Claude

## 2022-09-08 NOTE — PROGRESS NOTES
Physician Progress Note      PATIENT:               William Daniels  CSN #:                  [id number]  :                       1980  ADMIT DATE:       2022 5:46 PM  [facility] DATE:  RESPONDING  PROVIDER #:        Lynn Walker MD          QUERY TEXT:    Patient admitted with Left Gluteal Abscess. Per Op note dated 2022   documentation of Incision & Drainage. To accurately reflect the procedure performed please further specify the depth   of tissue incised and drained: The medical record reflects the following:  Risk Factors: complex left gluteal abscess  Clinical Indicators:  OR note: \"Local anesthesia was infiltrated into the   skin and subcutaneous tissue as a field block. A radial incision was made   with a scalpel. A large complex fluctuant cavity was encountered. The wound   was opened widely with a scalpel. All loculations were freed. The wound was   irrigated. Deep wound cultures were obtained. The wound was then packed with   1 inch iodoform gauze. \"  Treatment: I&D  as above for Gluteal Abscess, IV Antibiotics, dermatology   and Infectious Disease consults  Options provided:  -- Skin only  -- Subcutaneous tissue  -- Fascia  -- Muscle  -- Other - I will add my own diagnosis  -- Disagree - Not applicable / Not valid  -- Disagree - Clinically unable to determine / Unknown  -- Refer to Clinical Documentation Reviewer    PROVIDER RESPONSE TEXT:    Addendum to 2022 procedure note The depth of the drainage to Gluteal   Abscess was down to and including subcutaneous tissue.     Query created by: Marina Edwards on 2022 7:37 AM      Electronically signed by:  Lynn Walker MD 2022 5:07 AM

## 2022-09-09 LAB
CULTURE: ABNORMAL
CULTURE: NORMAL
CULTURE: NORMAL
DIRECT EXAM: ABNORMAL
DIRECT EXAM: ABNORMAL
Lab: NORMAL
Lab: NORMAL
SPECIMEN DESCRIPTION: ABNORMAL
SPECIMEN DESCRIPTION: NORMAL
SPECIMEN DESCRIPTION: NORMAL

## 2022-09-12 ENCOUNTER — HOSPITAL ENCOUNTER (EMERGENCY)
Age: 42
Discharge: HOME | End: 2022-09-12
Attending: EMERGENCY MEDICINE
Payer: COMMERCIAL

## 2022-09-12 ENCOUNTER — TELEPHONE (OUTPATIENT)
Dept: FAMILY MEDICINE CLINIC | Age: 42
End: 2022-09-12

## 2022-09-12 VITALS
TEMPERATURE: 99 F | BODY MASS INDEX: 25.18 KG/M2 | WEIGHT: 170 LBS | RESPIRATION RATE: 16 BRPM | HEART RATE: 86 BPM | OXYGEN SATURATION: 99 % | DIASTOLIC BLOOD PRESSURE: 74 MMHG | HEIGHT: 69 IN | SYSTOLIC BLOOD PRESSURE: 102 MMHG

## 2022-09-12 DIAGNOSIS — L03.112 CELLULITIS OF LEFT AXILLA: Primary | ICD-10-CM

## 2022-09-12 PROCEDURE — 99283 EMERGENCY DEPT VISIT LOW MDM: CPT

## 2022-09-12 RX ORDER — SULFAMETHOXAZOLE AND TRIMETHOPRIM 800; 160 MG/1; MG/1
1 TABLET ORAL 2 TIMES DAILY
Qty: 20 TABLET | Refills: 0 | Status: SHIPPED | OUTPATIENT
Start: 2022-09-12 | End: 2022-09-22

## 2022-09-12 ASSESSMENT — ENCOUNTER SYMPTOMS
ABDOMINAL PAIN: 0
COLOR CHANGE: 0
COUGH: 0
VOMITING: 0
CONSTIPATION: 0
SHORTNESS OF BREATH: 0
FACIAL SWELLING: 0
DIARRHEA: 0
EYE DISCHARGE: 0
EYE REDNESS: 0

## 2022-09-12 ASSESSMENT — PAIN SCALES - GENERAL: PAINLEVEL_OUTOF10: 8

## 2022-09-12 ASSESSMENT — PAIN - FUNCTIONAL ASSESSMENT: PAIN_FUNCTIONAL_ASSESSMENT: 0-10

## 2022-09-12 NOTE — TELEPHONE ENCOUNTER
Patient called. Was seen at [phone] today 9/12 and last week. Notes are in chart. ED will not write him a note to go back to work and called to see if we could. 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 He is asking for a [employer] appointment or something to get this note. How can we help? Please advise when you can. Thank you.

## 2022-09-12 NOTE — ED PROVIDER NOTES
[address] [place] [address] ED  EMERGENCY DEPARTMENT ENCOUNTER      Pt Name: Aliya Dela Cruz  MRN: [id number]  [place] 1980  Date of evaluation: 2022  Provider: Davida Mcpherson MD    [address] [place] [address]       Chief Complaint   Patient presents with    Abscess     Left armpit    Medication Refill     Out of ATB and pain meds         HISTORY OF PRESENT ILLNESS  (Location/Symptom, Timing/Onset, Context/Setting, Quality, Duration, Modifying Factors, Severity.)   Aliya Dela Cruz is a 39 y.o. male who presents to the emergency department because he feels like he needs more antibiotic. He had been admitted for cellulitis of his buttock and had to have an incision and drainage there and he had cellulitis in the left axilla as well. He finished his course of Bactrim and feels like he needs a bit more of the Bactrim and he does not want it to get worse. No drainage or fever. Nursing Notes were reviewed. ALLERGIES     Patient has no known allergies. CURRENT MEDICATIONS       Previous Medications    CHLORHEXIDINE GLUCONATE (ANTISEPTIC SKIN CLEANSER) 4 % SOLN EXTERNAL SOLUTION    Apply topically daily as needed (Hibiclens 3 times weekly, repeating this monthly)    CLONIDINE (CATAPRES) 0.2 MG TABLET    Take 1 tablet by mouth 2 times daily    HYDROXYZINE HCL (ATARAX) 25 MG TABLET    Take 1 tablet by mouth every 6 hours as needed for Itching    MUPIROCIN (BACTROBAN) 2 % CREAM    Apply 3 times daily.        PAST MEDICAL HISTORY         Diagnosis Date    ADHD (attention deficit hyperactivity disorder)     Depression        SURGICAL HISTORY           Procedure Laterality Date    ADENOIDECTOMY AND TYMPANOSTOMY TUBE PLACEMENT      FACIAL COSMETIC SURGERY           FAMILY HISTORY           Problem Relation Age of Onset    Rheum Arthritis Mother     Depression Mother     No Known Problems Father     No Known Problems Sister     No Known Problems Brother      Family Status   Relation Name Status    Mother      Father  (Not Specified)    Sister  (Not Specified)    Brother  (Not Specified)        SOCIAL HISTORY      reports that he has never smoked. He has never used smokeless tobacco. He reports that he does not drink alcohol and does not use drugs. REVIEW OF SYSTEMS    (2-9 systems for level 4, 10 or more for level 5)     Review of Systems   Constitutional:  Negative for chills, fatigue and fever. HENT:  Negative for congestion, ear discharge and facial swelling. Eyes:  Negative for discharge and redness. Respiratory:  Negative for cough and shortness of breath. Cardiovascular:  Negative for chest pain. Gastrointestinal:  Negative for abdominal pain, constipation, diarrhea and vomiting. Genitourinary:  Negative for dysuria and hematuria. Musculoskeletal:  Negative for arthralgias. Skin:  Negative for color change and rash. Neurological:  Negative for syncope, numbness and headaches. Hematological:  Negative for adenopathy. Psychiatric/Behavioral:  Negative for confusion. The patient is not nervous/anxious. Except as noted above the remainder of the review of systems was reviewed and negative. PHYSICAL EXAM    (up to 7 for level 4, 8 or more for level 5)     Vitals:    09/12/22 0902   BP: 102/74   Pulse: 86   Resp: 16   Temp: 99 °F (37.2 °C)   TempSrc: Oral   SpO2: 99%   Weight: 170 lb (77.1 kg)   Height: 5' 9\" (1.753 m)       Physical Exam  Vitals reviewed. Constitutional:       General: He is not in acute distress. Appearance: He is well-developed. He is not diaphoretic. HENT:      Head: Normocephalic and atraumatic. Eyes:      General: No scleral icterus. Right eye: No discharge. Left eye: No discharge. Cardiovascular:      Rate and Rhythm: Normal rate and regular rhythm. Pulmonary:      Effort: Pulmonary effort is normal. No respiratory distress. Breath sounds: Normal breath sounds. No stridor. No wheezing or rales.    Abdominal:      General: There is no distension. Palpations: Abdomen is soft. Tenderness: There is no abdominal tenderness. Musculoskeletal:         General: Normal range of motion. Cervical back: Neck supple. Comments: Left axilla shows a very small area of erythema which is not fluctuant or raised. Buttock shows healing incision from where he had his abscess drained. There is no active drainage and there is minimal erythema. Lymphadenopathy:      Cervical: No cervical adenopathy. Skin:     General: Skin is warm and dry. Findings: No erythema or rash. Neurological:      Mental Status: He is alert and oriented to person, place, and time. Psychiatric:         Behavior: Behavior normal.           DIAGNOSTIC RESULTS     EKG: All EKG's are interpreted by the Emergency Department Physician who either signs or Co-signs this chart in the absence of a cardiologist.    Not indicated    RADIOLOGY:   Non-plain film images such as CT, Ultrasound and MRI are read by the radiologist. Plain radiographic images are visualized and preliminarily interpreted by the emergency physician with the below findings:    Not indicated    Interpretation per the Radiologist below, if available at the time of this note:        LABS:  Labs Reviewed - No data to display    All other labs were within normal range or not returned as of this dictation. EMERGENCY DEPARTMENT COURSE and DIFFERENTIAL DIAGNOSIS/MDM:   Vitals:    Vitals:    09/12/22 0902   BP: 102/74   Pulse: 86   Resp: 16   Temp: 99 °F (37.2 °C)   TempSrc: Oral   SpO2: 99%   Weight: 170 lb (77.1 kg)   Height: 5' 9\" (1.753 m)       Orders Placed This Encounter   Medications    sulfamethoxazole-trimethoprim (BACTRIM DS) 800-160 MG per tablet     Sig: Take 1 tablet by mouth 2 times daily for 10 days     Dispense:  20 tablet     Refill:  0       Medical Decision Making: He will be provided a 10-day course of Bactrim. Treatment diagnosis and follow-up were discussed with the patient. Incision and drainage is not indicated. CONSULTS:  None    PROCEDURES:  None    FINAL IMPRESSION      1. Cellulitis of left axilla          DISPOSITION/PLAN   DISPOSITION Decision To Discharge 09/12/2022 09:33:15 AM      PATIENT REFERRED TO:   [facility] ED  [address] [facility]  [phone]    If symptoms worsen      DISCHARGE MEDICATIONS:     New Prescriptions    SULFAMETHOXAZOLE-TRIMETHOPRIM (BACTRIM DS) 800-160 MG PER TABLET    Take 1 tablet by mouth 2 times daily for 10 days       The care is provided during an unprecedented national emergency due to the novel coronavirus, COVID-19.     (Please note that portions of this note were completed with a voice recognition program.  Efforts were made to edit the dictations but occasionally words are mis-transcribed.)    Alvin Acuna MD  Attending Emergency Physician            Alvin Acuna MD  09/12/22 5359

## 2022-09-12 NOTE — TELEPHONE ENCOUNTER
Patient called. He went to the ED today and they told him to contact his PCP for note. I read him your note and he started yelling he's in the middle. I explained that the provider that saw him is the one that should write the note. He said \"this is f-ing ridiculous! \"   I told him to please stop yelling and I understand his frustration but, is ultimately the provider that saw him to write the note. Patient started yelling again about needing to contact his boss and the hospital is telling him one thing and we are telling him another. He states \"this is so f-ed up\"   I told him I would need to disconnect now. He was still yelling as I hung up the phone.

## 2022-09-13 NOTE — TELEPHONE ENCOUNTER
Please cancel his new patient appointment. He does not need to be verbal with the staff in our office.

## 2022-09-21 ENCOUNTER — TELEPHONE (OUTPATIENT)
Dept: DERMATOLOGY | Age: 42
End: 2022-09-21

## 2022-09-22 ENCOUNTER — OFFICE VISIT (OUTPATIENT)
Dept: DERMATOLOGY | Age: 42
End: 2022-09-22
Payer: COMMERCIAL

## 2022-09-22 VITALS
WEIGHT: 176.8 LBS | HEIGHT: 69 IN | HEART RATE: 90 BPM | BODY MASS INDEX: 26.19 KG/M2 | DIASTOLIC BLOOD PRESSURE: 71 MMHG | OXYGEN SATURATION: 97 % | SYSTOLIC BLOOD PRESSURE: 112 MMHG

## 2022-09-22 DIAGNOSIS — L08.9 INFECTION OF SKIN DUE TO METHICILLIN RESISTANT STAPHYLOCOCCUS AUREUS (MRSA): Primary | ICD-10-CM

## 2022-09-22 DIAGNOSIS — B95.62 INFECTION OF SKIN DUE TO METHICILLIN RESISTANT STAPHYLOCOCCUS AUREUS (MRSA): Primary | ICD-10-CM

## 2022-09-22 PROCEDURE — G8427 DOCREV CUR MEDS BY ELIG CLIN: HCPCS | Performed by: DERMATOLOGY

## 2022-09-22 PROCEDURE — 1111F DSCHRG MED/CURRENT MED MERGE: CPT | Performed by: DERMATOLOGY

## 2022-09-22 PROCEDURE — 1036F TOBACCO NON-USER: CPT | Performed by: DERMATOLOGY

## 2022-09-22 PROCEDURE — 99213 OFFICE O/P EST LOW 20 MIN: CPT | Performed by: DERMATOLOGY

## 2022-09-22 PROCEDURE — G8419 CALC BMI OUT NRM PARAM NOF/U: HCPCS | Performed by: DERMATOLOGY

## 2022-09-22 NOTE — PROGRESS NOTES
Medications   Medication Sig Dispense Refill    chlorhexidine gluconate (ANTISEPTIC SKIN CLEANSER) 4 % SOLN external solution Use as wash from neck down 3 times weekly, focusing on skin folds 118 mL 5    mupirocin (BACTROBAN) 2 % ointment Apply to nares three times daily x 5 days, repeating monthly (5 days per month) 22 g 1    mupirocin (BACTROBAN) 2 % cream Apply 3 times daily. 15 g 1    cloNIDine (CATAPRES) 0.2 MG tablet Take 1 tablet by mouth 2 times daily 60 tablet 5    sulfamethoxazole-trimethoprim (BACTRIM DS) 800-160 MG per tablet Take 1 tablet by mouth 2 times daily for 10 days (Patient not taking: Reported on 9/22/2022) 20 tablet 0    hydrOXYzine HCl (ATARAX) 25 MG tablet Take 1 tablet by mouth every 6 hours as needed for Itching (Patient not taking: Reported on 9/22/2022) 20 tablet 0     No current facility-administered medications for this visit. ALLERGIES:   No Known Allergies    SOCIAL HISTORY:  Social History     Tobacco Use    Smoking status: Never    Smokeless tobacco: Never   Substance Use Topics    Alcohol use: No     Comment: recovering alcoholic       Pertinent ROS:  Review of Systems  Skin: Denies any new changing, growing or bleeding lesions or rashes except as described in the HPI   Constitutional: Denies fevers, chills, and malaise. PHYSICAL EXAM:   /71 (Site: Right Upper Arm, Position: Sitting, Cuff Size: Medium Adult)   Pulse 90   Ht 5' 9\" (1.753 m)   Wt 176 lb 12.8 oz (80.2 kg)   SpO2 97%   BMI 26.11 kg/m²     The patient is generally well appearing, well nourished, alert and conversational. Affect is normal.    Cutaneous Exam:  Physical Exam  Sun-exposed skin: head/face, neck, both arms, digits and nails were examined. Facial covering was not removed during examination. Diagnoses/exam findings/medical history pertinent to this visit are listed below:    Assessment:   Diagnosis Orders   1.  Infection of skin due to methicillin resistant Staphylococcus aureus (MRSA) chlorhexidine gluconate (ANTISEPTIC SKIN CLEANSER) 4 % SOLN external solution    mupirocin (BACTROBAN) 2 % ointment           Plan:  Recurrent infection of skin due to MRSA  - discussed diagnosis, etiology, natural course, and treatment options   - he has had recurrent infections with admissions for IV antibiotics. ? immunodeficiency or external source/exposure to MRSA  - chronic illness, responding to treatment but not yet at goal   - continue hibiclens wash and mupirocin ointment  - referrals to immunology and infectious disease placed    RTC 6 months     Future Appointments   Date Time Provider Codie Wu   9/22/2022 10:40 AM JANESSA Camarena - CNP [facility]  [facility]   3/22/2023  9:15 AM Vanessa Persaud MD  derm [facility]         There are no Patient Instructions on file for this visit. Jakub Adair, personally scribed the services dictated to me by Dr. Yamile Ariza in this documentation. I, Dr. Yamile Ariza, personally performed the services described in this documentation, as scribed by [facility] in my presence, and it is both accurate and complete.     Electronically signed by Vanessa Persaud MD on 9/22/2022 at 12:28 PM

## 2022-09-29 ENCOUNTER — OFFICE VISIT (OUTPATIENT)
Dept: INFECTIOUS DISEASES | Age: 42
End: 2022-09-29
Payer: COMMERCIAL

## 2022-09-29 VITALS
WEIGHT: 171.8 LBS | DIASTOLIC BLOOD PRESSURE: 68 MMHG | BODY MASS INDEX: 25.45 KG/M2 | TEMPERATURE: 98.1 F | HEIGHT: 69 IN | SYSTOLIC BLOOD PRESSURE: 105 MMHG | HEART RATE: 88 BPM

## 2022-09-29 DIAGNOSIS — L08.9 RECURRENT INFECTION OF SKIN: ICD-10-CM

## 2022-09-29 DIAGNOSIS — Z22.322 MRSA (METHICILLIN RESISTANT STAPHYLOCOCCUS AUREUS) COLONIZATION: Primary | ICD-10-CM

## 2022-09-29 PROCEDURE — 1111F DSCHRG MED/CURRENT MED MERGE: CPT | Performed by: INTERNAL MEDICINE

## 2022-09-29 PROCEDURE — 99213 OFFICE O/P EST LOW 20 MIN: CPT | Performed by: INTERNAL MEDICINE

## 2022-09-29 PROCEDURE — G8427 DOCREV CUR MEDS BY ELIG CLIN: HCPCS | Performed by: INTERNAL MEDICINE

## 2022-09-29 PROCEDURE — 1036F TOBACCO NON-USER: CPT | Performed by: INTERNAL MEDICINE

## 2022-09-29 PROCEDURE — G8419 CALC BMI OUT NRM PARAM NOF/U: HCPCS | Performed by: INTERNAL MEDICINE

## 2022-09-29 ASSESSMENT — ENCOUNTER SYMPTOMS
GASTROINTESTINAL NEGATIVE: 1
RESPIRATORY NEGATIVE: 1

## 2022-09-29 NOTE — PROGRESS NOTES
[facility]  Office Progress Note  Today's Date and Time: 9/29/2022, 10:27 AM    Impression:     1. MRSA (methicillin resistant Staphylococcus aureus) colonization    2. Recurrent infection of skin         Recommendations   The patient recently had an MRSA gluteal abscess for which he underwent surgical drainage and now has no residual signs of infection. The patient will continue on decolonization protocol with Hibiclens, mupirocin ointment. He does tell me that there is a plan for an outpatient immunology work-up  I have asked him to use the Hibiclens daily for 7 days and repeat this process for 7 days each month. The patient will follow-up with me on an as-needed basis    I have ordered the following medications/ labs:  No orders of the defined types were placed in this encounter. No orders of the defined types were placed in this encounter. Chief complaint/reason for consultation:     Chief Complaint   Patient presents with    Frequent Infections     [facility] follow up         History of Present Illness:   Fernando Coats is a 39y.o.-year-old male who I am seeing in follow-up and has a history of recurrent skin and soft tissue infections related to MRSA and he was hospitalized at [facility] and [facility] with MRSA left-sided gluteal abscess for which he underwent an I&D    Patient was treated with intravenous antimicrobial therapy while hospitalized and was discharged on oral antimicrobial therapy with Bactrim. The plan was also for him to undergo decolonization protocol with Hibiclens 3 times a week, mupirocin ointment to the nares 3 times at a and to repeat this monthly. The patient is otherwise doing well reports that he has been using the Hibiclens daily and does not report any issues or untoward effects. He feels that his skin issues/infections seem to be overall improved.     I have personally reviewedthe past medical history, medications, social history, and I have updated the database accordingly. Past Medical History:     Past Medical History:   Diagnosis Date    ADHD (attention deficit hyperactivity disorder)     Depression      Medications:     Current Outpatient Medications   Medication Sig Dispense Refill    chlorhexidine gluconate (ANTISEPTIC SKIN CLEANSER) 4 % SOLN external solution Use as wash from neck down 3 times weekly, focusing on skin folds 118 mL 5    mupirocin (BACTROBAN) 2 % ointment Apply to nares three times daily x 5 days, repeating monthly (5 days per month) 22 g 1    mupirocin (BACTROBAN) 2 % cream Apply 3 times daily. 15 g 1    hydrOXYzine HCl (ATARAX) 25 MG tablet Take 1 tablet by mouth every 6 hours as needed for Itching 20 tablet 0    cloNIDine (CATAPRES) 0.2 MG tablet Take 1 tablet by mouth 2 times daily 60 tablet 5     No current facility-administered medications for this visit. Allergies:   Patient has no known allergies. Review of Systems:   Review of Systems   Constitutional: Negative. HENT: Negative. Respiratory: Negative. Cardiovascular: Negative. Gastrointestinal: Negative. Genitourinary: Negative. Musculoskeletal: Negative. Neurological: Negative. Psychiatric/Behavioral: Negative. Physical Examination :   /68 (Site: Left Upper Arm)   Pulse 88   Temp 98.1 °F (36.7 °C)   Ht 5' 9\" (1.753 m)   Wt 171 lb 12.8 oz (77.9 kg)   BMI 25.37 kg/m²     Physical Exam  Constitutional:       Appearance: He is well-developed. HENT:      Head: Normocephalic and atraumatic. Cardiovascular:      Rate and Rhythm: Normal rate. Heart sounds: Normal heart sounds. No friction rub. No gallop. Pulmonary:      Effort: Pulmonary effort is normal.      Breath sounds: Normal breath sounds. No wheezing. Abdominal:      General: Bowel sounds are normal.      Palpations: Abdomen is soft. There is no mass. Tenderness: There is no abdominal tenderness.    Musculoskeletal:         General: Normal range of motion. Cervical back: Normal range of motion and neck supple. Lymphadenopathy:      Cervical: No cervical adenopathy. Skin:     General: Skin is warm and dry. Neurological:      Mental Status: He is alert and oriented to person, place, and time. Laboratory studies :  Medical Decision Making:   I have independently reviewed the following labs:  CBC with Differential:  Lab Results   Component Value Date/Time    WBC 11.3 09/05/2022 05:55 AM    WBC 14.4 09/04/2022 06:31 PM    HGB 12.0 09/05/2022 05:55 AM    HGB 13.0 09/04/2022 06:31 PM    HCT 37.7 09/05/2022 05:55 AM    HCT 39.7 09/04/2022 06:31 PM     09/05/2022 05:55 AM     09/04/2022 06:31 PM    LYMPHOPCT 10 09/04/2022 06:31 PM    LYMPHOPCT 26 06/02/2022 05:30 AM    MONOPCT 11 09/04/2022 06:31 PM    MONOPCT 13 06/02/2022 05:30 AM       BMP:  Lab Results   Component Value Date/Time     09/05/2022 05:55 AM     09/04/2022 06:31 PM    K 3.9 09/05/2022 05:55 AM    K 4.0 09/04/2022 06:31 PM     09/05/2022 05:55 AM    CL 99 09/04/2022 06:31 PM    CO2 25 09/05/2022 05:55 AM    CO2 25 09/04/2022 06:31 PM    BUN 7 09/07/2022 06:33 AM    BUN 12 09/05/2022 05:55 AM    CREATININE 0.72 09/07/2022 06:33 AM    CREATININE 0.84 09/05/2022 05:55 AM       Hepatic Function Panel: No results found for: PROT, LABALBU, BILIDIR, IBILI, BILITOT, ALKPHOS, ALT, AST    Lab Results   Component Value Date/Time    CRP 6.1 06/01/2022 05:37 PM     Lab Results   Component Value Date/Time    SEDRATE 22 06/01/2022 05:37 PM         Imaging Studies:   No new imaging    Cultures:     Component 9/6/22 8380    Specimen Description . BUTTOCK L    Direct Exam MANY NEUTROPHILS Abnormal     Direct Exam FEW GRAM POSITIVE COCCI IN CLUSTERS Abnormal     Culture METHICILLIN RESISTANT STAPHYLOCOCCUS AUREUS HEAVY GROWTH Abnormal     Resulting Agency [address]     Methicillin-Resistant Staphylococcus aureus (1)    Antibiotic Interpretation Microscan Method Status    penicillin Resistant >=0.5 BACTERIAL SUSCEPTIBILITY PANEL ABEBE Final    clindamycin Resistant >=8 BACTERIAL SUSCEPTIBILITY PANEL ABEBE Final    erythromycin Resistant >=8 BACTERIAL SUSCEPTIBILITY PANEL ABEBE Final    gentamicin Sensitive <=0.5 BACTERIAL SUSCEPTIBILITY PANEL ABEBE Final     Gentamicin is used only in combination with other active agents that test susceptible. levofloxacin Sensitive 0.25 BACTERIAL SUSCEPTIBILITY PANEL ABEBE Final    oxacillin Resistant >=4 BACTERIAL SUSCEPTIBILITY PANEL ABEBE Final    tetracycline Resistant >=16 BACTERIAL SUSCEPTIBILITY PANEL ABEBE Final    trimethoprim-sulfamethoxazole Sensitive <=10 BACTERIAL SUSCEPTIBILITY PANEL ABEBE Final    vancomycin Sensitive 1 BACTERIAL SUSCEPTIBILITY PANEL ABEBE Final          Specimen Collected: 09/06/22 15:50 EDT Last Resulted: 09/09/22 08:31 EDT          Thank you for allowing us to participate in the care of this patient. Pleasecall with questions. Aj Estes MD  Perfect [employer] messaging: [phone]    This note is created with the assistance of a speech recognition program.  While intending to generate a document that actually reflects the content ofthe visit, the document can still have some errors including those of syntax and sound a like substitutions which may escape proof reading. It such instances, actual meaning can be extrapolated by contextual diversion.

## 2022-10-22 NOTE — ED PROVIDER NOTES
MD  Attending Emergency Physician        Katlyn Parra MD  03/20/20 570 [address] Jewels Hernandez MD  03/24/20 2024
to display    All other labs were within normal range or not returned as of this dictation. EMERGENCY DEPARTMENT COURSE and DIFFERENTIAL DIAGNOSIS/MDM:   Vitals:    Vitals:    03/20/20 1620   BP: 120/70   Pulse: 139   Resp: 16   Temp: 98.6 °F (37 °C)   TempSrc: Oral   SpO2: 99%   Weight: 170 lb (77.1 kg)   Height: 5' 9\" (1.753 m)       Medical Decision Making: Placed on oral scabies medication one-time dose in addition to some permethrin. Follow-up with dermatology    FINAL IMPRESSION      1. Scabies          DISPOSITION/PLAN   DISPOSITION Decision To Discharge 03/20/2020 05:02:20 PM      PATIENT REFERRED TO:   No follow-up provider specified.     DISCHARGE MEDICATIONS:     Discharge Medication List as of 3/20/2020  5:05 PM      START taking these medications    Details   ivermectin 3 MG tablet Take 5 tablets by mouth once for 1 dose Take 3/24/20, Disp-4 tablet, R-0Print                 (Please note that portions of this note were completed with a voice recognition program.  Efforts were made to edit the dictations but occasionally words are mis-transcribed.)    [address] [place] [address] NP, APRN - CNP  Certified Nurse Practitioner            JANESSA Mcelroy CNP  03/21/20 0149
Depression (without Suicidality or Psychosis)/Anxiety (includes Panic, OCD)

## 2022-12-04 ENCOUNTER — HOSPITAL ENCOUNTER (EMERGENCY)
Age: 42
Discharge: HOME | End: 2022-12-05
Attending: EMERGENCY MEDICINE
Payer: COMMERCIAL

## 2022-12-04 VITALS
RESPIRATION RATE: 16 BRPM | TEMPERATURE: 97.7 F | DIASTOLIC BLOOD PRESSURE: 72 MMHG | HEART RATE: 74 BPM | BODY MASS INDEX: 25.73 KG/M2 | WEIGHT: 173.7 LBS | OXYGEN SATURATION: 96 % | HEIGHT: 69 IN | SYSTOLIC BLOOD PRESSURE: 106 MMHG

## 2022-12-04 DIAGNOSIS — L03.317 CELLULITIS OF BUTTOCK: Primary | ICD-10-CM

## 2022-12-04 PROCEDURE — 99283 EMERGENCY DEPT VISIT LOW MDM: CPT

## 2022-12-04 RX ORDER — ESCITALOPRAM OXALATE 20 MG/1
20 TABLET ORAL DAILY
COMMUNITY

## 2022-12-05 PROCEDURE — 6370000000 HC RX 637 (ALT 250 FOR IP): Performed by: EMERGENCY MEDICINE

## 2022-12-05 RX ORDER — SULFAMETHOXAZOLE AND TRIMETHOPRIM 800; 160 MG/1; MG/1
1 TABLET ORAL ONCE
Status: COMPLETED | OUTPATIENT
Start: 2022-12-05 | End: 2022-12-05

## 2022-12-05 RX ORDER — SULFAMETHOXAZOLE AND TRIMETHOPRIM 800; 160 MG/1; MG/1
1 TABLET ORAL 2 TIMES DAILY
Qty: 19 TABLET | Refills: 0 | Status: SHIPPED | OUTPATIENT
Start: 2022-12-05 | End: 2022-12-15

## 2022-12-05 RX ADMIN — SULFAMETHOXAZOLE AND TRIMETHOPRIM 1 TABLET: 800; 160 TABLET ORAL at 00:36

## 2022-12-05 ASSESSMENT — ENCOUNTER SYMPTOMS
VOMITING: 0
COUGH: 0
SHORTNESS OF BREATH: 0
NAUSEA: 0
DIARRHEA: 0
TROUBLE SWALLOWING: 0
PHOTOPHOBIA: 0
COLOR CHANGE: 0
ABDOMINAL PAIN: 0

## 2022-12-05 NOTE — ED NOTES
Writer found pt's cell phone in pt room after pt left, writer attempted to call phone numbers on file and leave VM to notify pt of found phone, VM not set up and pt did not answer. Phone placed in speci bag, labeled with pt sticker, and given to charge RN.       Rosa Thompson RN  12/05/22 6268

## 2022-12-05 NOTE — ED PROVIDER NOTES
EMERGENCY DEPARTMENT ENCOUNTER    Pt Name: Mandy Aguero  MRN: [id number]  [place] 1980  Date of evaluation: 12/5/22  CHIEF COMPLAINT       Chief Complaint   Patient presents with    Other     On buttock history of mrsa with this     HISTORY OF PRESENT ILLNESS   51-year-old male presents to the ER complaining of a feeling like his cellulitis is coming back to his buttocks region. Patient has a history of MRSA infections. Patient states he has noticed it come over the last few days. The history is provided by the patient. Rash  Location:  Pelvis  Pelvic rash location:  Anus  Quality comment:  Uncomfortable feeling  Severity:  Mild  Onset quality:  Gradual  Duration:  3 days  Timing:  Constant  Progression:  Unchanged  Associated symptoms: no abdominal pain, no diarrhea, no fatigue, no fever, no joint pain, no myalgias, no nausea, no shortness of breath and not vomiting          REVIEW OF SYSTEMS     Review of Systems   Constitutional:  Negative for activity change, fatigue and fever. HENT:  Negative for congestion, ear pain and trouble swallowing. Eyes:  Negative for photophobia and visual disturbance. Respiratory:  Negative for cough and shortness of breath. Cardiovascular:  Negative for chest pain and palpitations. Gastrointestinal:  Negative for abdominal pain, diarrhea, nausea and vomiting. Genitourinary:  Negative for dysuria, flank pain and urgency. Musculoskeletal:  Negative for arthralgias and myalgias. Skin:  Positive for rash. Negative for color change. Neurological:  Negative for dizziness and facial asymmetry. Psychiatric/Behavioral:  Negative for agitation and behavioral problems.     PASTMEDICAL HISTORY     Past Medical History:   Diagnosis Date    ADHD (attention deficit hyperactivity disorder)     Depression     MRSA (methicillin resistant staph aureus) culture positive      Past Problem List  Patient Active Problem List   Diagnosis Code    Generalized anxiety disorder F41.1    Moderate episode of recurrent major depressive disorder (HCC) F33.1    Attention deficit hyperactivity disorder (ADHD) F90.9    Cutaneous abscess of right upper extremity L02.413    Cellulitis and abscess of upper extremity L03.119, L02.419    Cellulitis L03.90    Boil, scrotum N49.2    Vesicular skin lesions R23.8    Recurrent infection of skin L08.9    Cellulitis and abscess of buttock L02.31, L03.317    Infection of skin due to methicillin resistant Staphylococcus aureus (MRSA) L08.9, B95.62    Perianal abscess K61.0    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     SURGICAL HISTORY       Past Surgical History:   Procedure Laterality Date    ADENOIDECTOMY AND TYMPANOSTOMY TUBE PLACEMENT      FACIAL COSMETIC SURGERY       CURRENT MEDICATIONS       Previous Medications    CHLORHEXIDINE GLUCONATE (ANTISEPTIC SKIN CLEANSER) 4 % SOLN EXTERNAL SOLUTION    Use as wash from neck down 3 times weekly, focusing on skin folds    CLONIDINE (CATAPRES) 0.2 MG TABLET    Take 1 tablet by mouth 2 times daily    ESCITALOPRAM (LEXAPRO) 20 MG TABLET    Take 20 mg by mouth daily    HYDROXYZINE HCL (ATARAX) 25 MG TABLET    Take 1 tablet by mouth every 6 hours as needed for Itching    MUPIROCIN (BACTROBAN) 2 % OINTMENT    Apply to nares three times daily x 5 days, repeating monthly (5 days per month)     ALLERGIES     has No Known Allergies. FAMILY HISTORY     He indicated that his mother is . He indicated that the status of his father is unknown. He indicated that the status of his sister is unknown. He indicated that the status of his brother is unknown.      SOCIAL HISTORY       Social History     Tobacco Use    Smoking status: Never    Smokeless tobacco: Never   Vaping Use    Vaping Use: Never used   Substance Use Topics    Alcohol use: No     Comment: recovering alcoholic    Drug use: Yes     Types: Marijuana (Weed)     PHYSICAL EXAM     INITIAL VITALS: /72   Pulse 74   Temp 97.7 °F (36.5 °C)   Resp 16   Ht 5' 9\" (1.753 m)   Wt 173 lb 11.2 oz (78.8 kg)   SpO2 96%   BMI 25.65 kg/m²    Physical Exam  Constitutional:       General: He is not in acute distress. Appearance: Normal appearance. HENT:      Head: Normocephalic and atraumatic. Right Ear: External ear normal.      Left Ear: External ear normal.      Nose: Nose normal. No congestion or rhinorrhea. Eyes:      Extraocular Movements: Extraocular movements intact. Pupils: Pupils are equal, round, and reactive to light. Cardiovascular:      Rate and Rhythm: Normal rate and regular rhythm. Pulses: Normal pulses. Heart sounds: Normal heart sounds. Pulmonary:      Effort: Pulmonary effort is normal. No respiratory distress. Breath sounds: Normal breath sounds. Abdominal:      General: Bowel sounds are normal.      Palpations: Abdomen is soft. Genitourinary:     Rectum: External hemorrhoid present. Comments: No evidence of rash or erythema in area  Musculoskeletal:         General: No deformity. Normal range of motion. Cervical back: Normal range of motion. No rigidity. Skin:     General: Skin is warm and dry. Neurological:      General: No focal deficit present. Mental Status: He is alert and oriented to person, place, and time. Mental status is at baseline. Psychiatric:         Mood and Affect: Mood normal.         Behavior: Behavior normal.       MEDICAL DECISION MAKING:     Patient with evidence of an external hemorrhoid. Due to the previous history of MRSA skin infection and the patient stating that it feels like it is coming on and it feels similar to her prior presentation, the patient was started on an oral antibiotic. The patient was instructed to follow-up with the primary care physician within 2 days and to return to the ER immediately if symptoms worsen or change. Patient understands and agrees with the plan.                  CRITICAL CARE:       PROCEDURES:    Procedures    DIAGNOSTIC RESULTS EKG:All EKG's are interpreted by the Emergency Department Physician who either signs or Co-signs this chart in the absence of a cardiologist.        RADIOLOGY:All plain film, CT, MRI, and formal ultrasound images (except ED bedside ultrasound) are read by the radiologist, see reports below, unless otherwisenoted in MDM or here. No orders to display     LABS: All lab results were reviewed by myself, and all abnormals are listed below. Labs Reviewed - No data to display    EMERGENCY DEPARTMENTCOURSE:         Vitals:    Vitals:    12/04/22 2249   BP: 106/72   Pulse: 74   Resp: 16   Temp: 97.7 °F (36.5 °C)   SpO2: 96%   Weight: 173 lb 11.2 oz (78.8 kg)   Height: 5' 9\" (1.753 m)       The patient was given the following medications while in the emergency department:  Orders Placed This Encounter   Medications    sulfamethoxazole-trimethoprim (BACTRIM DS) 800-160 MG per tablet     Sig: Take 1 tablet by mouth 2 times daily for 10 days     Dispense:  19 tablet     Refill:  0    sulfamethoxazole-trimethoprim (BACTRIM DS;SEPTRA DS) 800-160 MG per tablet 1 tablet     Order Specific Question:   Antimicrobial Indications     Answer:   Skin and Soft Tissue Infection     CONSULTS:  None    FINAL IMPRESSION      1. Cellulitis of buttock          DISPOSITION/PLAN   DISPOSITION Decision To Discharge 12/05/2022 12:24:22 AM      PATIENT REFERRED TO:  PCP  [id number]  Schedule an appointment as soon as possible for a visit in 2 days      [facility] ED  [address] [facility]  [phone]  Go to   As needed, If symptoms worsen  DISCHARGE MEDICATIONS:  New Prescriptions    SULFAMETHOXAZOLE-TRIMETHOPRIM (BACTRIM DS) 800-160 MG PER TABLET    Take 1 tablet by mouth 2 times daily for 10 days     The care is provided during an unprecedented national emergency due to the novel coronavirus, COVID 19.   DO Devin Lindsay DO  12/05/22 0028

## 2022-12-05 NOTE — ED NOTES
Writer at bedside with Dr Danielle Wayne for pt assessment to buttocks, pt verbalized OK that writer was the chaperone for assessment. Pt tolerated well, updated on plan of care. NAD noted, will wait for orders.       Kimberly Gordon RN  12/05/22 9169

## 2023-07-06 NOTE — TELEPHONE ENCOUNTER
Tried to contact patient regarding appointment tomorrow, patient did not answer and mailbox is full. Dapsone Counseling: I discussed with the patient the risks of dapsone including but not limited to hemolytic anemia, agranulocytosis, rashes, methemoglobinemia, kidney failure, peripheral neuropathy, headaches, GI upset, and liver toxicity.  Patients who start dapsone require monitoring including baseline LFTs and weekly CBCs for the first month, then every month thereafter.  The patient verbalized understanding of the proper use and possible adverse effects of dapsone.  All of the patient's questions and concerns were addressed.

## 2023-09-07 ENCOUNTER — OFFICE VISIT (OUTPATIENT)
Dept: FAMILY MEDICINE CLINIC | Age: 43
End: 2023-09-07
Payer: COMMERCIAL

## 2023-09-07 VITALS
HEART RATE: 74 BPM | BODY MASS INDEX: 22.22 KG/M2 | OXYGEN SATURATION: 97 % | TEMPERATURE: 97.7 F | DIASTOLIC BLOOD PRESSURE: 60 MMHG | HEIGHT: 69 IN | WEIGHT: 150 LBS | SYSTOLIC BLOOD PRESSURE: 102 MMHG

## 2023-09-07 DIAGNOSIS — Z13.220 SCREENING FOR LIPID DISORDERS: ICD-10-CM

## 2023-09-07 DIAGNOSIS — F33.1 MODERATE EPISODE OF RECURRENT MAJOR DEPRESSIVE DISORDER (HCC): ICD-10-CM

## 2023-09-07 DIAGNOSIS — F41.1 GENERALIZED ANXIETY DISORDER: Primary | ICD-10-CM

## 2023-09-07 PROBLEM — F41.9 ANXIETY: Status: ACTIVE | Noted: 2023-09-07

## 2023-09-07 PROCEDURE — 99214 OFFICE O/P EST MOD 30 MIN: CPT | Performed by: STUDENT IN AN ORGANIZED HEALTH CARE EDUCATION/TRAINING PROGRAM

## 2023-09-07 RX ORDER — ESCITALOPRAM OXALATE 10 MG/1
10 TABLET ORAL DAILY
Qty: 30 TABLET | Refills: 3 | Status: SHIPPED | OUTPATIENT
Start: 2023-09-07

## 2023-09-07 SDOH — ECONOMIC STABILITY: FOOD INSECURITY: WITHIN THE PAST 12 MONTHS, YOU WORRIED THAT YOUR FOOD WOULD RUN OUT BEFORE YOU GOT MONEY TO BUY MORE.: NEVER TRUE

## 2023-09-07 SDOH — ECONOMIC STABILITY: HOUSING INSECURITY
IN THE LAST 12 MONTHS, WAS THERE A TIME WHEN YOU DID NOT HAVE A STEADY PLACE TO SLEEP OR SLEPT IN A SHELTER (INCLUDING NOW)?: NO

## 2023-09-07 SDOH — ECONOMIC STABILITY: FOOD INSECURITY: WITHIN THE PAST 12 MONTHS, THE FOOD YOU BOUGHT JUST DIDN'T LAST AND YOU DIDN'T HAVE MONEY TO GET MORE.: NEVER TRUE

## 2023-09-07 SDOH — ECONOMIC STABILITY: INCOME INSECURITY: HOW HARD IS IT FOR YOU TO PAY FOR THE VERY BASICS LIKE FOOD, HOUSING, MEDICAL CARE, AND HEATING?: NOT HARD AT ALL

## 2023-09-07 ASSESSMENT — ANXIETY QUESTIONNAIRES
1. FEELING NERVOUS, ANXIOUS, OR ON EDGE: 3
7. FEELING AFRAID AS IF SOMETHING AWFUL MIGHT HAPPEN: 0
6. BECOMING EASILY ANNOYED OR IRRITABLE: 0
3. WORRYING TOO MUCH ABOUT DIFFERENT THINGS: 3
4. TROUBLE RELAXING: 3
5. BEING SO RESTLESS THAT IT IS HARD TO SIT STILL: 3
IF YOU CHECKED OFF ANY PROBLEMS ON THIS QUESTIONNAIRE, HOW DIFFICULT HAVE THESE PROBLEMS MADE IT FOR YOU TO DO YOUR WORK, TAKE CARE OF THINGS AT HOME, OR GET ALONG WITH OTHER PEOPLE: VERY DIFFICULT
2. NOT BEING ABLE TO STOP OR CONTROL WORRYING: 3
GAD7 TOTAL SCORE: 15

## 2023-09-07 ASSESSMENT — PATIENT HEALTH QUESTIONNAIRE - PHQ9
SUM OF ALL RESPONSES TO PHQ QUESTIONS 1-9: 12
4. FEELING TIRED OR HAVING LITTLE ENERGY: 3
8. MOVING OR SPEAKING SO SLOWLY THAT OTHER PEOPLE COULD HAVE NOTICED. OR THE OPPOSITE, BEING SO FIGETY OR RESTLESS THAT YOU HAVE BEEN MOVING AROUND A LOT MORE THAN USUAL: 0
3. TROUBLE FALLING OR STAYING ASLEEP: 3
1. LITTLE INTEREST OR PLEASURE IN DOING THINGS: 3
6. FEELING BAD ABOUT YOURSELF - OR THAT YOU ARE A FAILURE OR HAVE LET YOURSELF OR YOUR FAMILY DOWN: 0
5. POOR APPETITE OR OVEREATING: 0
SUM OF ALL RESPONSES TO PHQ9 QUESTIONS 1 & 2: 3
10. IF YOU CHECKED OFF ANY PROBLEMS, HOW DIFFICULT HAVE THESE PROBLEMS MADE IT FOR YOU TO DO YOUR WORK, TAKE CARE OF THINGS AT HOME, OR GET ALONG WITH OTHER PEOPLE: 2
SUM OF ALL RESPONSES TO PHQ QUESTIONS 1-9: 12
2. FEELING DOWN, DEPRESSED OR HOPELESS: 0
SUM OF ALL RESPONSES TO PHQ QUESTIONS 1-9: 12
7. TROUBLE CONCENTRATING ON THINGS, SUCH AS READING THE NEWSPAPER OR WATCHING TELEVISION: 3
SUM OF ALL RESPONSES TO PHQ QUESTIONS 1-9: 12
9. THOUGHTS THAT YOU WOULD BE BETTER OFF DEAD, OR OF HURTING YOURSELF: 0

## 2023-09-07 ASSESSMENT — ENCOUNTER SYMPTOMS: SHORTNESS OF BREATH: 0

## 2023-09-07 NOTE — PROGRESS NOTES
[facility]  [address] [facility]  [address] [place] [phone]     Date of Visit:  2023  Patient Name: Nohemi Chahal   Patient :  1980     CHIEF COMPLAINT:     Nohemi Chahal is a 43 y.o. male who presents today for an general visit to be evaluated for the following condition(s):  Chief Complaint   Patient presents with    New Patient     Previous pcp Dr Hilbert [facility]   Respiratory:  Negative for shortness of breath. Cardiovascular:  Positive for palpitations. Negative for chest pain. Psychiatric/Behavioral:  Positive for depression. HISTORY OF PRESENT ILLNESS     Anxiety  Presents for initial visit. Onset was 1 to 5 years ago. The problem has been gradually worsening. Symptoms include palpitations. Patient reports no chest pain or shortness of breath. Symptoms occur constantly. The severity of symptoms is interfering with daily activities. The symptoms are aggravated by work stress (social settings). His past medical history is significant for anxiety/panic attacks. There is no history of anemia or CHF. Past treatments include SSRIs. The treatment provided significant (Patient went to [facility] for CBT but did not find it benefical) relief. Compliance with prior treatments has been good. Depression  Visit Type: initial  Onset of symptoms: more than 1 year ago  Progression since onset: gradually worsening  Patient presents with the following symptoms: palpitations. Patient is not experiencing: shortness of breath.   Frequency of symptoms: occasionally   Severity: mild   Patient has a history of: anxiety/panic attacks  Treatment tried: SSRI  Compliance with treatment: good  Improvement on treatment: significant      REVIEWED INFORMATION      No Known Allergies    Patient Active Problem List   Diagnosis    Generalized anxiety disorder    Moderate episode of recurrent major depressive disorder (HCC)    Attention

## 2023-10-05 ENCOUNTER — OFFICE VISIT (OUTPATIENT)
Dept: FAMILY MEDICINE CLINIC | Age: 43
End: 2023-10-05
Payer: COMMERCIAL

## 2023-10-05 VITALS
HEART RATE: 68 BPM | DIASTOLIC BLOOD PRESSURE: 65 MMHG | BODY MASS INDEX: 22.07 KG/M2 | HEIGHT: 69 IN | SYSTOLIC BLOOD PRESSURE: 102 MMHG | OXYGEN SATURATION: 95 % | TEMPERATURE: 98.7 F | WEIGHT: 149 LBS

## 2023-10-05 DIAGNOSIS — F41.9 ANXIETY: Primary | ICD-10-CM

## 2023-10-05 PROCEDURE — 99213 OFFICE O/P EST LOW 20 MIN: CPT | Performed by: STUDENT IN AN ORGANIZED HEALTH CARE EDUCATION/TRAINING PROGRAM

## 2023-10-05 RX ORDER — ESCITALOPRAM OXALATE 20 MG/1
20 TABLET ORAL DAILY
Qty: 30 TABLET | Refills: 0 | Status: SHIPPED | OUTPATIENT
Start: 2023-10-05

## 2023-10-05 ASSESSMENT — ANXIETY QUESTIONNAIRES
1. FEELING NERVOUS, ANXIOUS, OR ON EDGE: 3
2. NOT BEING ABLE TO STOP OR CONTROL WORRYING: 1
3. WORRYING TOO MUCH ABOUT DIFFERENT THINGS: 1
4. TROUBLE RELAXING: 1
5. BEING SO RESTLESS THAT IT IS HARD TO SIT STILL: 1
IF YOU CHECKED OFF ANY PROBLEMS ON THIS QUESTIONNAIRE, HOW DIFFICULT HAVE THESE PROBLEMS MADE IT FOR YOU TO DO YOUR WORK, TAKE CARE OF THINGS AT HOME, OR GET ALONG WITH OTHER PEOPLE: VERY DIFFICULT
6. BECOMING EASILY ANNOYED OR IRRITABLE: 0
7. FEELING AFRAID AS IF SOMETHING AWFUL MIGHT HAPPEN: 0
GAD7 TOTAL SCORE: 7

## 2023-10-05 NOTE — ASSESSMENT & PLAN NOTE
A/P: improved  -GAD7- currently 7  -patient endorsing difficulty falling asleep   -will increase to lexapro 20mg and see back in 4 weeks.

## 2023-10-05 NOTE — PROGRESS NOTES
[facility]  [facility]  [address] [place] [phone]     Date of Visit:  10/5/2023  Patient Name: Faith Alexander   Patient :  1980     CHIEF COMPLAINT:     Faith Alexander is a 43 y.o. male who presents today for an general visit to be evaluated for the following condition(s):  No chief complaint on file. REVIEW OF SYSTEM      Review of Systems   All other systems reviewed and are negative. HISTORY OF PRESENT ILLNESS     HPI    Patient is a 17-year-old male with a past medical history of ADHD, anxiety, presents to the office for follow-up of anxiety. He was recently prescribed Lexapro 10 mg 1 month ago. Since starting the medication he feels as though his anxiety has improved significantly. He does endorse having difficulties sleeping at night. Difficulties with falling asleep. He sleeps about 5 -6 1/2 hours a night. Previous MYLENE-7 was 15.   MYLENE-7 is 7  REVIEWED INFORMATION      No Known Allergies    Patient Active Problem List   Diagnosis    Generalized anxiety disorder    Moderate episode of recurrent major depressive disorder (HCC)    Attention deficit hyperactivity disorder (ADHD)    Cutaneous abscess of right upper extremity    Cellulitis and abscess of upper extremity    Cellulitis    Boil, scrotum    Vesicular skin lesions    Recurrent infection of skin    Cellulitis and abscess of buttock    Infection of skin due to methicillin resistant Staphylococcus aureus (MRSA)    Perianal abscess    Furunculosis    Anxiety       Past Medical History:   Diagnosis Date    ADHD (attention deficit hyperactivity disorder)     Depression     MRSA (methicillin resistant staph aureus) culture positive        Past Surgical History:   Procedure Laterality Date    ADENOIDECTOMY AND TYMPANOSTOMY TUBE PLACEMENT      FACIAL COSMETIC SURGERY          Social History     Socioeconomic History    Marital status: Single     Spouse name: None    Number of children: None    Years

## 2023-11-06 ENCOUNTER — TELEMEDICINE (OUTPATIENT)
Dept: FAMILY MEDICINE CLINIC | Age: 43
End: 2023-11-06
Payer: COMMERCIAL

## 2023-11-06 DIAGNOSIS — F41.9 ANXIETY: Primary | ICD-10-CM

## 2023-11-06 PROCEDURE — 99213 OFFICE O/P EST LOW 20 MIN: CPT | Performed by: STUDENT IN AN ORGANIZED HEALTH CARE EDUCATION/TRAINING PROGRAM

## 2023-11-06 RX ORDER — BUSPIRONE HYDROCHLORIDE 5 MG/1
5 TABLET ORAL 2 TIMES DAILY
Qty: 60 TABLET | Refills: 0 | Status: SHIPPED | OUTPATIENT
Start: 2023-11-06 | End: 2023-12-06

## 2023-11-06 SDOH — ECONOMIC STABILITY: FOOD INSECURITY: WITHIN THE PAST 12 MONTHS, YOU WORRIED THAT YOUR FOOD WOULD RUN OUT BEFORE YOU GOT MONEY TO BUY MORE.: NEVER TRUE

## 2023-11-06 SDOH — ECONOMIC STABILITY: FOOD INSECURITY: WITHIN THE PAST 12 MONTHS, THE FOOD YOU BOUGHT JUST DIDN'T LAST AND YOU DIDN'T HAVE MONEY TO GET MORE.: NEVER TRUE

## 2023-11-06 SDOH — ECONOMIC STABILITY: INCOME INSECURITY: HOW HARD IS IT FOR YOU TO PAY FOR THE VERY BASICS LIKE FOOD, HOUSING, MEDICAL CARE, AND HEATING?: NOT HARD AT ALL

## 2023-11-07 DIAGNOSIS — F41.9 ANXIETY: ICD-10-CM

## 2023-11-07 NOTE — TELEPHONE ENCOUNTER
Mariano Zhu is calling to request a refill on the following medication(s):    Medication Request:  Requested Prescriptions     Pending Prescriptions Disp Refills    escitalopram (LEXAPRO) 20 MG tablet 30 tablet 0     Sig: Take 1 tablet by mouth daily       Last Visit Date (If Applicable):  05/5/4251    Next Visit Date:    Visit date not found

## 2023-11-07 NOTE — ASSESSMENT & PLAN NOTE
A/P: Unchanged-uncontrolled  At this time we will continue Lexapro and will add BuSpar. Advised patient to contact us in 1-2 weeks to increase the Buspar dose. We will see patient back in 1 month.

## 2023-11-07 NOTE — PROGRESS NOTES
[facility]  [facility]  [address] [place] [phone]     Date of Visit:  2023  Patient Name: Munira Velasquez   Patient :  1980     CHIEF COMPLAINT:     Munira Velasquez is a 43 y.o. male who presents today for an general visit to be evaluated for the following condition(s):  Chief Complaint   Patient presents with    Depression    Anxiety       REVIEW OF SYSTEM      Review of Systems   All other systems reviewed and are negative. HISTORY OF PRESENT ILLNESS     HPI    Patient is a 70-year-old male with a past medical history of anxiety and depression is presenting to the office for follow-up. Patient was started on Lexapro 20 mg. Patient states that he is upset with the progress of his symptoms since he continues to have anxiety.      REVIEWED INFORMATION      No Known Allergies    Patient Active Problem List   Diagnosis    Generalized anxiety disorder    Moderate episode of recurrent major depressive disorder (HCC)    Attention deficit hyperactivity disorder (ADHD)    Cutaneous abscess of right upper extremity    Cellulitis and abscess of upper extremity    Cellulitis    Boil, scrotum    Vesicular skin lesions    Recurrent infection of skin    Cellulitis and abscess of buttock    Infection of skin due to methicillin resistant Staphylococcus aureus (MRSA)    Perianal abscess    Furunculosis    Anxiety       Past Medical History:   Diagnosis Date    ADHD (attention deficit hyperactivity disorder)     Depression     MRSA (methicillin resistant staph aureus) culture positive        Past Surgical History:   Procedure Laterality Date    ADENOIDECTOMY AND TYMPANOSTOMY TUBE PLACEMENT      FACIAL COSMETIC SURGERY          Social History     Socioeconomic History    Marital status: Single   Tobacco Use    Smoking status: Never    Smokeless tobacco: Never   Vaping Use    Vaping Use: Never used   Substance and Sexual Activity    Alcohol use: No     Comment: recovering

## 2023-11-08 RX ORDER — ESCITALOPRAM OXALATE 20 MG/1
20 TABLET ORAL DAILY
Qty: 30 TABLET | Refills: 2 | Status: SHIPPED | OUTPATIENT
Start: 2023-11-08

## 2023-11-15 DIAGNOSIS — F41.9 ANXIETY: ICD-10-CM

## 2023-11-16 RX ORDER — ESCITALOPRAM OXALATE 20 MG/1
20 TABLET ORAL DAILY
Qty: 30 TABLET | Refills: 2 | OUTPATIENT
Start: 2023-11-16

## 2023-11-21 ENCOUNTER — TELEPHONE (OUTPATIENT)
Dept: FAMILY MEDICINE CLINIC | Age: 43
End: 2023-11-21

## 2023-11-21 DIAGNOSIS — F41.9 ANXIETY: Primary | ICD-10-CM

## 2023-11-21 NOTE — TELEPHONE ENCOUNTER
----- Message from Tamika Carmichael sent at 11/21/2023  3:12 PM EST -----  Subject: Message to Provider    QUESTIONS  Information for Provider? Pt was unable to send a message through [address] [place] [address],   so he wanted to let  [facility] know that the BUSPIRONE she prescribed has   been working very well and pt has had no side effects. He said he would be   comfortable with  [facility] increasing the dosage, as was discussed during   his last apt.   ---------------------------------------------------------------------------  --------------  [place]  [phone]; OK to leave message on voicemail  ---------------------------------------------------------------------------  --------------  SCRIPT ANSWERS  Relationship to Patient?  Self

## 2023-11-22 RX ORDER — BUSPIRONE HYDROCHLORIDE 10 MG/1
10 TABLET ORAL 2 TIMES DAILY
Qty: 60 TABLET | Refills: 0 | Status: SHIPPED | OUTPATIENT
Start: 2023-11-22 | End: 2023-12-22

## 2024-01-03 DIAGNOSIS — F41.9 ANXIETY: ICD-10-CM

## 2024-01-03 RX ORDER — ESCITALOPRAM OXALATE 20 MG/1
20 TABLET ORAL DAILY
Qty: 30 TABLET | Refills: 2 | Status: SHIPPED | OUTPATIENT
Start: 2024-01-03

## 2024-01-03 RX ORDER — BUSPIRONE HYDROCHLORIDE 10 MG/1
10 TABLET ORAL 2 TIMES DAILY
Qty: 60 TABLET | Refills: 2 | Status: SHIPPED | OUTPATIENT
Start: 2024-01-03 | End: 2024-02-02

## 2024-01-03 NOTE — TELEPHONE ENCOUNTER
Harlan Lowery is calling to request a refill on the following medication(s):    Medication Request:  Requested Prescriptions     Pending Prescriptions Disp Refills    escitalopram (LEXAPRO) 20 MG tablet 30 tablet 2     Sig: Take 1 tablet by mouth daily    busPIRone (BUSPAR) 10 MG tablet 60 tablet 0     Sig: Take 1 tablet by mouth 2 times daily       Last Visit Date (If Applicable):  11/6/2023    Next Visit Date:    Visit date not found

## 2024-02-09 DIAGNOSIS — F41.9 ANXIETY: ICD-10-CM

## 2024-02-09 RX ORDER — ESCITALOPRAM OXALATE 20 MG/1
20 TABLET ORAL DAILY
Qty: 30 TABLET | Refills: 2 | Status: SHIPPED | OUTPATIENT
Start: 2024-02-09

## 2024-02-09 NOTE — TELEPHONE ENCOUNTER
Harlan Lowrey is calling to request a refill on the following medication(s):    Medication Request:  Requested Prescriptions     Pending Prescriptions Disp Refills    escitalopram (LEXAPRO) 20 MG tablet [Pharmacy Med Name: ESCITALOPRAM 20 MG TABLET] 30 tablet 2     Sig: TAKE 1 TABLET BY MOUTH DAILY       Last Visit Date (If Applicable):  11/6/2023    Next Visit Date:    Visit date not found

## 2024-05-06 DIAGNOSIS — F41.9 ANXIETY: ICD-10-CM

## 2024-05-07 RX ORDER — ESCITALOPRAM OXALATE 20 MG/1
20 TABLET ORAL DAILY
Qty: 90 TABLET | Refills: 0 | Status: SHIPPED | OUTPATIENT
Start: 2024-05-07

## 2024-05-07 NOTE — TELEPHONE ENCOUNTER
Harlan Lowery is calling to request a refill on the following medication(s):    Medication Request:  Requested Prescriptions     Pending Prescriptions Disp Refills    escitalopram (LEXAPRO) 20 MG tablet [Pharmacy Med Name: ESCITALOPRAM 20 MG TABLET] 90 tablet      Sig: TAKE 1 TABLET BY MOUTH DAILY       Last Visit Date (If Applicable):  11/6/2023    Next Visit Date:    Visit date not found

## 2024-08-29 DIAGNOSIS — F41.9 ANXIETY: ICD-10-CM

## 2024-08-29 RX ORDER — ESCITALOPRAM OXALATE 20 MG/1
20 TABLET ORAL DAILY
Qty: 90 TABLET | Refills: 0 | Status: SHIPPED | OUTPATIENT
Start: 2024-08-29

## 2024-08-29 NOTE — TELEPHONE ENCOUNTER
Harlan Lowery is calling to request a refill on the following medication(s):    Medication Request:  Requested Prescriptions     Pending Prescriptions Disp Refills    escitalopram (LEXAPRO) 20 MG tablet 90 tablet 0     Sig: Take 1 tablet by mouth daily       Last Visit Date (If Applicable):  11/6/2023    Next Visit Date:    Visit date not found

## 2024-11-22 DIAGNOSIS — F41.9 ANXIETY: ICD-10-CM

## 2024-11-25 RX ORDER — ESCITALOPRAM OXALATE 20 MG/1
20 TABLET ORAL DAILY
Qty: 90 TABLET | Refills: 0 | Status: SHIPPED | OUTPATIENT
Start: 2024-11-25

## 2024-11-25 NOTE — TELEPHONE ENCOUNTER
Harlan Lowery is calling to request a refill on the following medication(s):    Medication Request:  Requested Prescriptions     Pending Prescriptions Disp Refills    escitalopram (LEXAPRO) 20 MG tablet [Pharmacy Med Name: ESCITALOPRAM 20 MG TABLET] 90 tablet 0     Sig: TAKE 1 TABLET BY MOUTH DAILY       Last Visit Date (If Applicable):  11/6/2023    Next Visit Date:    Visit date not found

## 2025-02-04 DIAGNOSIS — F41.9 ANXIETY: ICD-10-CM

## 2025-02-04 RX ORDER — ESCITALOPRAM OXALATE 20 MG/1
20 TABLET ORAL DAILY
Qty: 90 TABLET | Refills: 0 | Status: SHIPPED | OUTPATIENT
Start: 2025-02-04

## 2025-02-04 NOTE — TELEPHONE ENCOUNTER
Harlan Lowery is calling to request a refill on the following medication(s):    Medication Request:  Requested Prescriptions     Pending Prescriptions Disp Refills    escitalopram (LEXAPRO) 20 MG tablet 90 tablet 0     Sig: Take 1 tablet by mouth daily       Last Visit Date (If Applicable):  11/6/2023    Next Visit Date:    2/13/2025

## 2025-02-13 ENCOUNTER — OFFICE VISIT (OUTPATIENT)
Dept: FAMILY MEDICINE CLINIC | Age: 45
End: 2025-02-13

## 2025-02-13 VITALS
SYSTOLIC BLOOD PRESSURE: 94 MMHG | BODY MASS INDEX: 23.13 KG/M2 | OXYGEN SATURATION: 95 % | TEMPERATURE: 98.7 F | HEART RATE: 90 BPM | HEIGHT: 69 IN | WEIGHT: 156.2 LBS | DIASTOLIC BLOOD PRESSURE: 60 MMHG

## 2025-02-13 DIAGNOSIS — F41.9 ANXIETY: ICD-10-CM

## 2025-02-13 DIAGNOSIS — R29.818 SUSPECTED SLEEP APNEA: Primary | ICD-10-CM

## 2025-02-13 PROCEDURE — 99214 OFFICE O/P EST MOD 30 MIN: CPT | Performed by: STUDENT IN AN ORGANIZED HEALTH CARE EDUCATION/TRAINING PROGRAM

## 2025-02-13 RX ORDER — HYDROXYZINE HYDROCHLORIDE 25 MG/1
25 TABLET, FILM COATED ORAL NIGHTLY
Qty: 30 TABLET | Refills: 0 | Status: SHIPPED | OUTPATIENT
Start: 2025-02-13 | End: 2025-03-15

## 2025-02-13 RX ORDER — ESCITALOPRAM OXALATE 20 MG/1
20 TABLET ORAL DAILY
Qty: 90 TABLET | Refills: 0 | Status: SHIPPED | OUTPATIENT
Start: 2025-02-13

## 2025-02-13 SDOH — ECONOMIC STABILITY: FOOD INSECURITY: WITHIN THE PAST 12 MONTHS, THE FOOD YOU BOUGHT JUST DIDN'T LAST AND YOU DIDN'T HAVE MONEY TO GET MORE.: NEVER TRUE

## 2025-02-13 SDOH — ECONOMIC STABILITY: FOOD INSECURITY: WITHIN THE PAST 12 MONTHS, YOU WORRIED THAT YOUR FOOD WOULD RUN OUT BEFORE YOU GOT MONEY TO BUY MORE.: NEVER TRUE

## 2025-02-13 ASSESSMENT — SLEEP AND FATIGUE QUESTIONNAIRES
HOW LIKELY ARE YOU TO NOD OFF OR FALL ASLEEP WHILE WATCHING TV: WOULD NEVER DOZE
HOW LIKELY ARE YOU TO NOD OFF OR FALL ASLEEP WHILE SITTING INACTIVE IN A PUBLIC PLACE: WOULD NEVER DOZE
ESS TOTAL SCORE: 3
HOW LIKELY ARE YOU TO NOD OFF OR FALL ASLEEP WHILE SITTING AND TALKING TO SOMEONE: WOULD NEVER DOZE
HOW LIKELY ARE YOU TO NOD OFF OR FALL ASLEEP WHILE LYING DOWN TO REST IN THE AFTERNOON WHEN CIRCUMSTANCES PERMIT: HIGH CHANCE OF DOZING
HOW LIKELY ARE YOU TO NOD OFF OR FALL ASLEEP IN A CAR, WHILE STOPPED FOR A FEW MINUTES IN TRAFFIC: WOULD NEVER DOZE
HOW LIKELY ARE YOU TO NOD OFF OR FALL ASLEEP WHEN YOU ARE A PASSENGER IN A CAR FOR AN HOUR WITHOUT A BREAK: WOULD NEVER DOZE
HOW LIKELY ARE YOU TO NOD OFF OR FALL ASLEEP WHILE SITTING QUIETLY AFTER LUNCH WITHOUT ALCOHOL: WOULD NEVER DOZE
HOW LIKELY ARE YOU TO NOD OFF OR FALL ASLEEP WHILE SITTING AND READING: WOULD NEVER DOZE

## 2025-02-13 ASSESSMENT — PATIENT HEALTH QUESTIONNAIRE - PHQ9
8. MOVING OR SPEAKING SO SLOWLY THAT OTHER PEOPLE COULD HAVE NOTICED. OR THE OPPOSITE, BEING SO FIGETY OR RESTLESS THAT YOU HAVE BEEN MOVING AROUND A LOT MORE THAN USUAL: NOT AT ALL
3. TROUBLE FALLING OR STAYING ASLEEP: NEARLY EVERY DAY
4. FEELING TIRED OR HAVING LITTLE ENERGY: NEARLY EVERY DAY
SUM OF ALL RESPONSES TO PHQ9 QUESTIONS 1 & 2: 0
SUM OF ALL RESPONSES TO PHQ QUESTIONS 1-9: 8
SUM OF ALL RESPONSES TO PHQ QUESTIONS 1-9: 8
1. LITTLE INTEREST OR PLEASURE IN DOING THINGS: NOT AT ALL
5. POOR APPETITE OR OVEREATING: SEVERAL DAYS
SUM OF ALL RESPONSES TO PHQ QUESTIONS 1-9: 8
2. FEELING DOWN, DEPRESSED OR HOPELESS: NOT AT ALL
SUM OF ALL RESPONSES TO PHQ QUESTIONS 1-9: 8
7. TROUBLE CONCENTRATING ON THINGS, SUCH AS READING THE NEWSPAPER OR WATCHING TELEVISION: NOT AT ALL
6. FEELING BAD ABOUT YOURSELF - OR THAT YOU ARE A FAILURE OR HAVE LET YOURSELF OR YOUR FAMILY DOWN: SEVERAL DAYS
10. IF YOU CHECKED OFF ANY PROBLEMS, HOW DIFFICULT HAVE THESE PROBLEMS MADE IT FOR YOU TO DO YOUR WORK, TAKE CARE OF THINGS AT HOME, OR GET ALONG WITH OTHER PEOPLE: NOT DIFFICULT AT ALL
9. THOUGHTS THAT YOU WOULD BE BETTER OFF DEAD, OR OF HURTING YOURSELF: NOT AT ALL

## 2025-02-13 ASSESSMENT — ANXIETY QUESTIONNAIRES
4. TROUBLE RELAXING: NEARLY EVERY DAY
IF YOU CHECKED OFF ANY PROBLEMS ON THIS QUESTIONNAIRE, HOW DIFFICULT HAVE THESE PROBLEMS MADE IT FOR YOU TO DO YOUR WORK, TAKE CARE OF THINGS AT HOME, OR GET ALONG WITH OTHER PEOPLE: NOT DIFFICULT AT ALL
2. NOT BEING ABLE TO STOP OR CONTROL WORRYING: SEVERAL DAYS
3. WORRYING TOO MUCH ABOUT DIFFERENT THINGS: SEVERAL DAYS
7. FEELING AFRAID AS IF SOMETHING AWFUL MIGHT HAPPEN: NOT AT ALL
5. BEING SO RESTLESS THAT IT IS HARD TO SIT STILL: NEARLY EVERY DAY
1. FEELING NERVOUS, ANXIOUS, OR ON EDGE: SEVERAL DAYS
6. BECOMING EASILY ANNOYED OR IRRITABLE: NOT AT ALL
GAD7 TOTAL SCORE: 9

## 2025-02-15 NOTE — PROGRESS NOTES
therapy for insomnia, but is uncertain about insurance coverage.    [place] Sleepiness Scale was performed. He reports no likelihood of dozing off while sitting and reading, watching TV, sitting inactive in a public place, as a passenger in a car for an hour without a break, or sitting and talking to someone. He reports a high chance of dozing off when lying down to rest in the afternoon if circumstances permit. He also reports no likelihood of dozing off while sitting quietly after lunch without alcohol. However, he notes that if he were to lie down after lunch without drinking, he would definitely fall asleep. He mentions that the only time he can fall asleep for a nap is in the afternoon, particularly after work when he feels tired enough to lay down and sleep. Otherwise, he experiences difficulty sleeping at night, often tossing and turning.    SOCIAL HISTORY  The patient is a recovering alcoholic and has not consumed alcohol for 20 years. He admits to smoking pot to help with sleep.    MEDICATIONS  Current: Lexapro  Past: clonidine, NyQuil, Benadryl      REVIEWED INFORMATION      No Known Allergies    Patient Active Problem List   Diagnosis    Generalized anxiety disorder    Moderate episode of recurrent major depressive disorder (HCC)    Attention deficit hyperactivity disorder (ADHD)    Cutaneous abscess of right upper extremity    Cellulitis and abscess of upper extremity    Cellulitis    Boil, scrotum    Vesicular skin lesions    Recurrent infection of skin    Cellulitis and abscess of buttock    Infection of skin due to methicillin resistant Staphylococcus aureus (MRSA)    Perianal abscess    Furunculosis    Anxiety       Past Medical History:   Diagnosis Date    ADHD (attention deficit hyperactivity disorder)     Depression     MRSA (methicillin resistant staph aureus) culture positive        Past Surgical History:   Procedure Laterality Date    ADENOIDECTOMY AND TYMPANOSTOMY TUBE PLACEMENT      FACIAL

## 2025-04-17 DIAGNOSIS — F41.9 ANXIETY: ICD-10-CM

## 2025-04-17 RX ORDER — ESCITALOPRAM OXALATE 20 MG/1
20 TABLET ORAL DAILY
Qty: 90 TABLET | Refills: 0 | Status: SHIPPED | OUTPATIENT
Start: 2025-04-17

## 2025-04-17 NOTE — TELEPHONE ENCOUNTER
Harlan Lowery is calling to request a refill on the following medication(s):    Medication Request:  Requested Prescriptions     Pending Prescriptions Disp Refills    escitalopram (LEXAPRO) 20 MG tablet [Pharmacy Med Name: ESCITALOPRAM 20 MG TABLET] 90 tablet 0     Sig: TAKE 1 TABLET BY MOUTH DAILY       Last Visit Date (If Applicable):  2/13/2025    Next Visit Date:    Visit date not found        Last refill 2/13/2025. Prescription pending.

## 2025-07-01 DIAGNOSIS — F41.9 ANXIETY: ICD-10-CM

## 2025-07-01 NOTE — TELEPHONE ENCOUNTER
Harlan Lowery is calling to request a refill on the following medication(s):    Medication Request:  Requested Prescriptions     Pending Prescriptions Disp Refills    escitalopram (LEXAPRO) 20 MG tablet [Pharmacy Med Name: ESCITALOPRAM 20 MG TABLET] 90 tablet 0     Sig: TAKE 1 TABLET BY MOUTH DAILY       Last Visit Date (If Applicable):  2/13/2025    Next Visit Date:    Visit date not found

## 2025-07-02 RX ORDER — ESCITALOPRAM OXALATE 20 MG/1
20 TABLET ORAL DAILY
Qty: 90 TABLET | Refills: 0 | Status: SHIPPED | OUTPATIENT
Start: 2025-07-02